# Patient Record
Sex: MALE | Race: WHITE | NOT HISPANIC OR LATINO | Employment: OTHER | URBAN - METROPOLITAN AREA
[De-identification: names, ages, dates, MRNs, and addresses within clinical notes are randomized per-mention and may not be internally consistent; named-entity substitution may affect disease eponyms.]

---

## 2017-01-04 ENCOUNTER — OFFICE VISIT (OUTPATIENT)
Dept: FAMILY MEDICINE CLINIC | Facility: CLINIC | Age: 72
End: 2017-01-04

## 2017-01-04 VITALS
WEIGHT: 215.2 LBS | SYSTOLIC BLOOD PRESSURE: 130 MMHG | HEIGHT: 70 IN | TEMPERATURE: 98 F | HEART RATE: 84 BPM | DIASTOLIC BLOOD PRESSURE: 80 MMHG | BODY MASS INDEX: 30.81 KG/M2 | OXYGEN SATURATION: 96 %

## 2017-01-04 DIAGNOSIS — J01.90 ACUTE SINUSITIS, RECURRENCE NOT SPECIFIED, UNSPECIFIED LOCATION: Primary | ICD-10-CM

## 2017-01-04 DIAGNOSIS — J40 BRONCHITIS: ICD-10-CM

## 2017-01-04 PROCEDURE — 99214 OFFICE O/P EST MOD 30 MIN: CPT | Performed by: FAMILY MEDICINE

## 2017-01-04 RX ORDER — LEVOFLOXACIN 500 MG/1
500 TABLET, FILM COATED ORAL DAILY
Qty: 10 TABLET | Refills: 0 | Status: SHIPPED | OUTPATIENT
Start: 2017-01-04 | End: 2017-01-13 | Stop reason: SDUPTHER

## 2017-01-04 NOTE — MR AVS SNAPSHOT
Kali Crespo   1/4/2017 2:45 PM   Office Visit    Provider:  Haven Soria MD   Department:  [facility]   Dept Phone:  [phone]                Your Full Care Plan              Today's Medication Changes          These changes are accurate as of: 1/4/17  3:52 PM.  If you have any questions, ask your nurse or doctor.               New Medication(s)Ordered:     levoFLOXacin 500 MG tablet   Commonly known as:  LEVAQUIN   Take 1 tablet by mouth Daily for 10 days.   Started by:  Haven Soria MD            Where to Get Your Medications      These medications were sent to [address] - [address] [facility] - [phone]  - [phone] [address] [address]    Hours:  24-hours Phone:  [phone]     levoFLOXacin 500 MG tablet                  Your Updated Medication List          This list is accurate as of: 1/4/17  3:52 PM.  Always use your most recent med list.                CENTRAL-KEVON FOR SENIORS PO       diazePAM 5 MG tablet   Commonly known as:  VALIUM   Take 1 tablet by mouth 2 (Two) Times a Day As Needed for anxiety for up to 30 days.       fish oil 1000 MG capsule capsule       fluticasone 50 MCG/ACT nasal spray   Commonly known as:  FLONASE   1 spray into each nostril 2 (Two) Times a Day.       irbesartan 300 MG tablet   Commonly known as:  AVAPRO   Take 1 tablet by mouth Daily.       levoFLOXacin 500 MG tablet   Commonly known as:  LEVAQUIN   Take 1 tablet by mouth Daily for 10 days.       loratadine 10 MG tablet   Commonly known as:  CLARITIN       omeprazole 20 MG capsule   Commonly known as:  priLOSEC   Take 1 capsule by mouth Daily.       sildenafil 100 MG tablet   Commonly known as:  VIAGRA   Take 1 tablet by mouth Daily As Needed for erectile dysfunction.       simvastatin 40 MG tablet   Commonly known as:  ZOCOR   Take 1 tablet by mouth Every Night.       thiamine 100 MG tablet   Commonly known as:  VITAMIN B-1               You Were Diagnosed With        Codes Comments    Acute sinusitis, recurrence not specified, unspecified location    -  Primary ICD-10-CM: J01.90  ICD-9-CM: 461.9     Bronchitis     ICD-10-CM: J40  ICD-9-CM: 490       Instructions     None    Patient Instructions History      [employer] Signup     [facility] [employer] allows you to send messages to your doctor, view your test results, renew your prescriptions, schedule appointments, and more. To sign up, go to [employer] and click on the Sign Up Now link in the New User? box. Enter your [employer] Activation Code exactly as it appears below along with the last four digits of your Social Security Number and your Date of Birth () to complete the sign-up process. If you do not sign up before the expiration date, you must request a new code.    [employer] Activation Code: [email or web address]  Expires: 2017  3:51 PM    If you have questions, you can email [email or web address] or call [phone] to talk to our [employer] staff. Remember, [employer] is NOT to be used for urgent needs. For medical emergencies, dial 911.               Other Info from Your Visit           Your Appointments     Mar 08, 2017 11:00 AM EST   Follow Up with Maty Horowitz MD   [facility] NEUROLOGY (--)    [address] [address]  [facility] [address] [phone]           Arrive 15 minutes prior to appointment.            2017  1:30 PM EDT   Office Visit with Nathan Arango MD   [facility] FAMILY MEDICINE (--)    [address]  [facility] [address] [phone]           Arrive 15 minutes prior to appointment.              Allergies     Amoxicillin      Codeine Sulfate      Penicillins      Sulfa Antibiotics        Reason for Visit     URI     Cough     Wheezing           Vital Signs     Blood Pressure Pulse Temperature Height  "Weight Oxygen Saturation    130/80 (BP Location: Left arm, Patient Position: Sitting, Cuff Size: Large Adult) 84 98 °F (36.7 °C) (Oral) 70\" (177.8 cm) 215 lb 3.2 oz (97.6 kg) 96%    Body Mass Index Smoking Status                30.88 kg/m2 Former Smoker          Problems and Diagnoses Noted     Acute sinus infection    -  Primary    Bronchitis          "

## 2017-01-04 NOTE — PROGRESS NOTES
Subjective   Kali Crespo is a 71 y.o. male.     History of Present Illness Kali Crespo 71 y.o. male who presents for evaluation of upper respiratory congestion, sinus pressure and congestion. Symptoms include sore throat, nasal blockage, post nasal drip, productive cough and wheezing.  Onset of symptoms was 3 days ago, gradually worsening since that time. Patient denies shortness of breath, fever.   Evaluation to date: none Treatment to date:  none.       The following portions of the patient's history were reviewed and updated as appropriate: allergies, current medications, past family history, past medical history, past social history, past surgical history and problem list.    Review of Systems   Constitutional: Negative for fever.   HENT: Positive for congestion, postnasal drip, sinus pressure and sore throat.    Respiratory: Positive for cough and wheezing. Negative for shortness of breath.    Gastrointestinal: Negative for nausea and vomiting.   Skin: Negative.        Objective   Physical Exam   Constitutional: He appears well-developed and well-nourished.   HENT:   Right Ear: Tympanic membrane normal.   Left Ear: Tympanic membrane normal.   Mouth/Throat: Posterior oropharyngeal erythema present.   Eyes: EOM are normal. Pupils are equal, round, and reactive to light.   Cardiovascular: Normal rate, regular rhythm and normal heart sounds.    Pulmonary/Chest: Effort normal and breath sounds normal.   Lymphadenopathy:     He has no cervical adenopathy.   Skin: Skin is warm and dry.   Psychiatric: He has a normal mood and affect. His behavior is normal. Judgment and thought content normal.   Vitals reviewed.      Assessment/Plan   Kali was seen today for uri, cough and wheezing.    Diagnoses and all orders for this visit:    Acute sinusitis, recurrence not specified, unspecified location  -     levoFLOXacin (LEVAQUIN) 500 MG tablet; Take 1 tablet by mouth Daily for 10 days.    Bronchitis  -      levoFLOXacin (LEVAQUIN) 500 MG tablet; Take 1 tablet by mouth Daily for 10 days.

## 2017-01-13 ENCOUNTER — TELEPHONE (OUTPATIENT)
Dept: FAMILY MEDICINE CLINIC | Facility: CLINIC | Age: 72
End: 2017-01-13

## 2017-01-13 DIAGNOSIS — J01.90 ACUTE SINUSITIS, RECURRENCE NOT SPECIFIED, UNSPECIFIED LOCATION: ICD-10-CM

## 2017-01-13 DIAGNOSIS — J40 BRONCHITIS: ICD-10-CM

## 2017-01-13 RX ORDER — LEVOFLOXACIN 500 MG/1
500 TABLET, FILM COATED ORAL DAILY
Qty: 10 TABLET | Refills: 0 | Status: SHIPPED | OUTPATIENT
Start: 2017-01-13 | End: 2017-01-23

## 2017-03-08 ENCOUNTER — OFFICE VISIT (OUTPATIENT)
Dept: NEUROLOGY | Facility: CLINIC | Age: 72
End: 2017-03-08

## 2017-03-08 VITALS
DIASTOLIC BLOOD PRESSURE: 92 MMHG | SYSTOLIC BLOOD PRESSURE: 144 MMHG | BODY MASS INDEX: 29.84 KG/M2 | HEIGHT: 70 IN | WEIGHT: 208.4 LBS

## 2017-03-08 DIAGNOSIS — R42 VERTIGO: ICD-10-CM

## 2017-03-08 DIAGNOSIS — G43.009 MIGRAINE WITHOUT AURA AND WITHOUT STATUS MIGRAINOSUS, NOT INTRACTABLE: ICD-10-CM

## 2017-03-08 PROCEDURE — 99213 OFFICE O/P EST LOW 20 MIN: CPT | Performed by: PSYCHIATRY & NEUROLOGY

## 2017-03-08 RX ORDER — NORTRIPTYLINE HYDROCHLORIDE 25 MG/1
CAPSULE ORAL
COMMUNITY
Start: 2017-02-27 | End: 2017-06-14 | Stop reason: SDUPTHER

## 2017-03-08 RX ORDER — DIAZEPAM 5 MG/1
TABLET ORAL
Refills: 0 | COMMUNITY
Start: 2017-02-12 | End: 2017-03-08 | Stop reason: SDUPTHER

## 2017-03-08 RX ORDER — DIAZEPAM 5 MG/1
5 TABLET ORAL 2 TIMES DAILY
Qty: 60 TABLET | Refills: 0 | Status: SHIPPED | OUTPATIENT
Start: 2017-03-08 | End: 2017-04-07

## 2017-03-08 NOTE — PROGRESS NOTES
Subjective:   Patient ID: Kali Crespo is a 71 y.o. male with history of migraine headaches without aura and vertigo issues came for follow-up appointment.    History of Present Illness   He was unaccompanied and according to him, he has worsening of the headaches recently due to weather changes and having 1-2 migraines in a month, lasting for few hours, throbbing nature and pain is all over, severity 6/10, associated with nausea, photophobia and phonophobia and complaint with the medication nortriptyline and denies any side effects.  Complaining of dizziness episodes and happening once in one to 2 months and lasting for few minutes associated with nausea but denies any blurred vision, double vision, weakness, tingling numbness, balance problems while walking and complaint with the medication diazepam for more than 30 years and denies any side effects.  Denies any falls, passing out spells or recent hospitalizations since last visit.    The following portions of the patient's history were reviewed and updated as appropriate: allergies, current medications, past family history, past medical history, past social history, past surgical history and problem list.    Review of Systems   Constitutional: Negative for activity change, appetite change and fatigue.   HENT: Negative for facial swelling.    Eyes: Negative for visual disturbance.   Respiratory: Negative for choking, chest tightness and shortness of breath.    Cardiovascular: Negative for chest pain and leg swelling.   Endocrine: Negative for polydipsia and polyphagia.   Genitourinary: Negative for difficulty urinating, frequency and urgency.   Musculoskeletal: Positive for gait problem.   Skin: Negative for color change, rash and wound.   Allergic/Immunologic: Negative for environmental allergies, food allergies and immunocompromised state.   Neurological: Positive for headaches. Negative for tremors, syncope, facial asymmetry, speech difficulty and  light-headedness.   Hematological: Negative for adenopathy. Bruises/bleeds easily.   Psychiatric/Behavioral: Negative for agitation, behavioral problems, confusion, decreased concentration, dysphoric mood, hallucinations, self-injury, sleep disturbance and suicidal ideas. The patient is not nervous/anxious and is not hyperactive.         Objective:    Neurologic Exam  NEUROLOGICAL EXAMINATION  :  Higher integrative functions : No aphasia or dysarthria.  CRANIAL NERVES : Cranial nerve II: Normal visual acuity and visual fields.  On funduscopic exam, discs are flat with sharp margins cranial nerves III, IV, VI: Extraocular movements are full without nystagmus; pupils are equal, round and reactive to light.  Cranial nerve V: Normal facial sensation and strength of muscles of mastication.  Cranial nerve: VII: Facial movements are symmetric.  No weakness.  Cranial nerve VIII: Auditory acuity is normal.  Cranial nerve IX, X: Symmetric palate movement.  Cranial nerve XI sternocleidomastoid and trapezius are normal.  Cranial nerve XII: Tongue in the midline with no atrophy or fasciculations.      MOTOR : Normal muscle strength and tone.  SENSATION : Normal to light touch, pinprick, vibration sensations intact and Romberg is negative.  MUSCLE STRETCH REFLEXES : Normal and symmetric in the upper extremities and lower extremities and the plantar responses are flexor bilaterally. COORDINATION : Finger to nose test showed no dysmetria.  Rapid alternating movements are normal.   GAIT : Walks on heels, toes, except for mild difficulty with tandem walk.    Physical Exam   Vitals reviewed.  GENERAL EXAMINATION : Patient is well-developed, well-nourished, well-groomed, alert and approriate in no apparent distress.  HEENT: Normocephalic, normal funduscopic exam, no visible oral lesions.  NECK : Normal range of motion, no tenderness, no malalignment.  CARDIAC : Regular rate and rhythm, no murmurs.  CHEST : Clear to auscultation,  bilateral.   No wheezing .  ABDOMEN : Soft, non tender and non distended. EXTREMITIES: No edema. SKIN : No rashes or lesions visible. MUSCULOSKELETAL : No muscle weakness or muscle pain. No joint pains. PSYCHIATRIC : Awake, oriented and follwoing verbal commands well.     Assessment/Plan:     Kali was seen today for migraine.    Diagnoses and all orders for this visit:    Vertigo    Migraine without aura and without status migrainosus, not intractable    Other orders  -     diazePAM (VALIUM) 5 MG tablet; Take 1 tablet by mouth 2 (Two) Times a Day for 30 days.      71-year-old right-handed white male with history of migraine headaches without aura and vertigo issues with status post inner ear surgeries in 1970s came for follow-up appointment.  On exam, no new focal deficits were seen except for mild difficulty with tandem walk.  Discussed with the patient regarding his signs and symptoms and told him to change nortriptyline to 25 mg 3 capsules by mouth at bedtime and discussed about side effects.  Discussed about migraine triggers and medication overuse headaches.  Continue diazepam 5 mg by mouth twice a day and will run Sudarshan report and discussed about side effects.  Discussed about fall precautions and told him to continue physical activity and will consider physical therapy in future for worsening of balance issues.  Will follow-up in 3 months or earlier if problems arise.    Thank you for allowing me to participate in the care of the patient.  Please feel free to call for any questions at your convenience.    Yours sincerely,    Maty Horowitz M.D

## 2017-03-20 DIAGNOSIS — I10 BENIGN ESSENTIAL HTN: ICD-10-CM

## 2017-03-20 DIAGNOSIS — C61 PROSTATE CANCER (HCC): ICD-10-CM

## 2017-03-22 LAB
ALBUMIN SERPL-MCNC: 4.4 G/DL (ref 3.5–5.2)
ALBUMIN/GLOB SERPL: 2.2 G/DL
ALP SERPL-CCNC: 103 U/L (ref 39–117)
ALT SERPL-CCNC: 19 U/L (ref 1–41)
AST SERPL-CCNC: 17 U/L (ref 1–40)
BASOPHILS # BLD AUTO: 0.02 10*3/MM3 (ref 0–0.2)
BASOPHILS NFR BLD AUTO: 0.4 % (ref 0–1.5)
BILIRUB SERPL-MCNC: 0.5 MG/DL (ref 0.1–1.2)
BUN SERPL-MCNC: 14 MG/DL (ref 8–23)
BUN/CREAT SERPL: 15.2 (ref 7–25)
CALCIUM SERPL-MCNC: 9.7 MG/DL (ref 8.6–10.5)
CHLORIDE SERPL-SCNC: 102 MMOL/L (ref 98–107)
CHOLEST SERPL-MCNC: 191 MG/DL (ref 0–200)
CO2 SERPL-SCNC: 26.4 MMOL/L (ref 22–29)
CREAT SERPL-MCNC: 0.92 MG/DL (ref 0.76–1.27)
EOSINOPHIL # BLD AUTO: 0.19 10*3/MM3 (ref 0–0.7)
EOSINOPHIL NFR BLD AUTO: 3.6 % (ref 0.3–6.2)
ERYTHROCYTE [DISTWIDTH] IN BLOOD BY AUTOMATED COUNT: 13.7 % (ref 11.5–14.5)
GLOBULIN SER CALC-MCNC: 2 GM/DL
GLUCOSE SERPL-MCNC: 84 MG/DL (ref 65–99)
HCT VFR BLD AUTO: 43.9 % (ref 40.4–52.2)
HDLC SERPL-MCNC: 45 MG/DL (ref 40–60)
HGB BLD-MCNC: 15.2 G/DL (ref 13.7–17.6)
IMM GRANULOCYTES # BLD: 0.02 10*3/MM3 (ref 0–0.03)
IMM GRANULOCYTES NFR BLD: 0.4 % (ref 0–0.5)
LDLC SERPL CALC-MCNC: 132 MG/DL (ref 0–100)
LYMPHOCYTES # BLD AUTO: 1.33 10*3/MM3 (ref 0.9–4.8)
LYMPHOCYTES NFR BLD AUTO: 25.5 % (ref 19.6–45.3)
MCH RBC QN AUTO: 31.7 PG (ref 27–32.7)
MCHC RBC AUTO-ENTMCNC: 34.6 G/DL (ref 32.6–36.4)
MCV RBC AUTO: 91.5 FL (ref 79.8–96.2)
MONOCYTES # BLD AUTO: 0.37 10*3/MM3 (ref 0.2–1.2)
MONOCYTES NFR BLD AUTO: 7.1 % (ref 5–12)
NEUTROPHILS # BLD AUTO: 3.29 10*3/MM3 (ref 1.9–8.1)
NEUTROPHILS NFR BLD AUTO: 63 % (ref 42.7–76)
PLATELET # BLD AUTO: 219 10*3/MM3 (ref 140–500)
POTASSIUM SERPL-SCNC: 4.7 MMOL/L (ref 3.5–5.2)
PROT SERPL-MCNC: 6.4 G/DL (ref 6–8.5)
PSA SERPL-MCNC: <0.014 NG/ML (ref 0–4)
RBC # BLD AUTO: 4.8 10*6/MM3 (ref 4.6–6)
SODIUM SERPL-SCNC: 142 MMOL/L (ref 136–145)
TRIGL SERPL-MCNC: 71 MG/DL (ref 0–150)
TSH SERPL DL<=0.005 MIU/L-ACNC: 4.58 MIU/ML (ref 0.27–4.2)
VLDLC SERPL CALC-MCNC: 14.2 MG/DL (ref 5–40)
WBC # BLD AUTO: 5.22 10*3/MM3 (ref 4.5–10.7)

## 2017-03-29 LAB
T4 FREE SERPL-MCNC: 1.15 NG/DL (ref 0.93–1.7)
WRITTEN AUTHORIZATION: NORMAL

## 2017-04-11 ENCOUNTER — OFFICE VISIT (OUTPATIENT)
Dept: FAMILY MEDICINE CLINIC | Facility: CLINIC | Age: 72
End: 2017-04-11

## 2017-04-11 VITALS
WEIGHT: 209 LBS | BODY MASS INDEX: 29.92 KG/M2 | HEIGHT: 70 IN | TEMPERATURE: 98 F | SYSTOLIC BLOOD PRESSURE: 130 MMHG | HEART RATE: 96 BPM | RESPIRATION RATE: 16 BRPM | DIASTOLIC BLOOD PRESSURE: 79 MMHG

## 2017-04-11 DIAGNOSIS — K21.9 GASTROESOPHAGEAL REFLUX DISEASE WITHOUT ESOPHAGITIS: ICD-10-CM

## 2017-04-11 DIAGNOSIS — Z00.00 ANNUAL PHYSICAL EXAM: Primary | ICD-10-CM

## 2017-04-11 DIAGNOSIS — J30.1 SEASONAL ALLERGIC RHINITIS DUE TO POLLEN: ICD-10-CM

## 2017-04-11 DIAGNOSIS — I10 BENIGN ESSENTIAL HTN: ICD-10-CM

## 2017-04-11 DIAGNOSIS — E78.2 MIXED HYPERLIPIDEMIA: ICD-10-CM

## 2017-04-11 PROCEDURE — G0438 PPPS, INITIAL VISIT: HCPCS | Performed by: FAMILY MEDICINE

## 2017-04-11 PROCEDURE — 99214 OFFICE O/P EST MOD 30 MIN: CPT | Performed by: FAMILY MEDICINE

## 2017-04-11 RX ORDER — SIMVASTATIN 40 MG
40 TABLET ORAL NIGHTLY
Qty: 90 TABLET | Refills: 1 | Status: SHIPPED | OUTPATIENT
Start: 2017-04-11 | End: 2017-10-11 | Stop reason: SDUPTHER

## 2017-04-11 RX ORDER — IRBESARTAN 300 MG/1
300 TABLET ORAL DAILY
Qty: 90 TABLET | Refills: 1 | Status: SHIPPED | OUTPATIENT
Start: 2017-04-11 | End: 2017-10-11 | Stop reason: SDUPTHER

## 2017-04-11 RX ORDER — OMEPRAZOLE 20 MG/1
20 CAPSULE, DELAYED RELEASE ORAL DAILY
Qty: 90 CAPSULE | Refills: 1 | Status: SHIPPED | OUTPATIENT
Start: 2017-04-11 | End: 2017-10-11 | Stop reason: SDUPTHER

## 2017-04-11 RX ORDER — FLUTICASONE PROPIONATE 50 MCG
1 SPRAY, SUSPENSION (ML) NASAL 2 TIMES DAILY
Qty: 3 EACH | Refills: 1 | Status: SHIPPED | OUTPATIENT
Start: 2017-04-11 | End: 2017-10-11 | Stop reason: SDUPTHER

## 2017-04-11 RX ORDER — EPINEPHRINE 0.3 MG/.3ML
0.3 INJECTION SUBCUTANEOUS ONCE
Qty: 1 EACH | Refills: 5 | Status: SHIPPED | OUTPATIENT
Start: 2017-04-11 | End: 2017-04-11

## 2017-04-11 NOTE — PROGRESS NOTES
Subjective   Kali Crespo is a 71 y.o. male.     History of Present Illness     Chief Complaint:   Chief Complaint   Patient presents with   • Hypertension     MED REFILL / MEDICARE WELLNESS EXAM DUE / PHQ2 PHQ9 DONE NO DAILY ASA   • Hyperlipidemia   • Heartburn   • Arthritis   • LAB RESULTS.   • Allergies       Kali Crespo 71 y.o. male who presents today for Medical Management of the below listed issues and medication refills.  he has a history of   Patient Active Problem List   Diagnosis   • Cervical pain   • Migraine without aura and without status migrainosus, not intractable   • Vertigo   • Benign essential HTN   • Acid reflux   • Encounter for pre-operative cardiovascular clearance   • Abnormal EKG   • Hyperlipidemia   • Erectile dysfunction   • Prostate cancer   .  Since the last visit, he has overall felt well.  he has been compliant with   Current Outpatient Prescriptions:   •  fluticasone (FLONASE) 50 MCG/ACT nasal spray, 1 spray into each nostril 2 (Two) Times a Day., Disp: 3 each, Rfl: 1  •  irbesartan (AVAPRO) 300 MG tablet, Take 1 tablet by mouth Daily., Disp: 90 tablet, Rfl: 1  •  omeprazole (priLOSEC) 20 MG capsule, Take 1 capsule by mouth Daily., Disp: 90 capsule, Rfl: 1  •  simvastatin (ZOCOR) 40 MG tablet, Take 1 tablet by mouth Every Night., Disp: 90 tablet, Rfl: 1  •  loratadine (CLARITIN) 10 MG tablet, Take 10 mg by mouth daily., Disp: , Rfl:   •  Multiple Vitamins-Minerals (CENTRAL-KEVON FOR SENIORS PO), Take by mouth., Disp: , Rfl:   •  nortriptyline (PAMELOR) 25 MG capsule, , Disp: , Rfl:   •  Omega-3 Fatty Acids (FISH OIL) 1000 MG capsule capsule, Take 1,000 mg by mouth daily., Disp: , Rfl:   •  sildenafil (VIAGRA) 100 MG tablet, Take 1 tablet by mouth Daily As Needed for erectile dysfunction., Disp: 10 tablet, Rfl: 11  •  Thiamine HCl (VITAMIN B-1) 100 MG tablet, Take 500 mg by mouth daily., Disp: , Rfl: .  he denies medication side effects.    All of the chronic condition(s)  "listed above are stable w/o issues.    /79  Pulse 96  Temp 98 °F (36.7 °C) (Oral)   Resp 16  Ht 70\" (177.8 cm)  Wt 209 lb (94.8 kg)  BMI 29.99 kg/m2    Results for orders placed or performed in visit on 03/20/17   Comprehensive metabolic panel   Result Value Ref Range    Glucose 84 65 - 99 mg/dL    BUN 14 8 - 23 mg/dL    Creatinine 0.92 0.76 - 1.27 mg/dL    eGFR Non African Am 81 >60 mL/min/1.73    eGFR African Am 98 >60 mL/min/1.73    BUN/Creatinine Ratio 15.2 7.0 - 25.0    Sodium 142 136 - 145 mmol/L    Potassium 4.7 3.5 - 5.2 mmol/L    Chloride 102 98 - 107 mmol/L    Total CO2 26.4 22.0 - 29.0 mmol/L    Calcium 9.7 8.6 - 10.5 mg/dL    Total Protein 6.4 6.0 - 8.5 g/dL    Albumin 4.40 3.50 - 5.20 g/dL    Globulin 2.0 gm/dL    A/G Ratio 2.2 g/dL    Total Bilirubin 0.5 0.1 - 1.2 mg/dL    Alkaline Phosphatase 103 39 - 117 U/L    AST (SGOT) 17 1 - 40 U/L    ALT (SGPT) 19 1 - 41 U/L   Lipid panel   Result Value Ref Range    Total Cholesterol 191 0 - 200 mg/dL    Triglycerides 71 0 - 150 mg/dL    HDL Cholesterol 45 40 - 60 mg/dL    VLDL Cholesterol 14.2 5 - 40 mg/dL    LDL Cholesterol  132 (H) 0 - 100 mg/dL   TSH   Result Value Ref Range    TSH 4.580 (H) 0.270 - 4.200 mIU/mL   PSA   Result Value Ref Range    PSA <0.014 0.000 - 4.000 ng/mL   T4, Free   Result Value Ref Range    Free T4 1.15 0.93 - 1.70 ng/dL   Written Authorization   Result Value Ref Range    Written Authorization Comment    CBC and Differential   Result Value Ref Range    WBC 5.22 4.50 - 10.70 10*3/mm3    RBC 4.80 4.60 - 6.00 10*6/mm3    Hemoglobin 15.2 13.7 - 17.6 g/dL    Hematocrit 43.9 40.4 - 52.2 %    MCV 91.5 79.8 - 96.2 fL    MCH 31.7 27.0 - 32.7 pg    MCHC 34.6 32.6 - 36.4 g/dL    RDW 13.7 11.5 - 14.5 %    Platelets 219 140 - 500 10*3/mm3    Neutrophil Rel % 63.0 42.7 - 76.0 %    Lymphocyte Rel % 25.5 19.6 - 45.3 %    Monocyte Rel % 7.1 5.0 - 12.0 %    Eosinophil Rel % 3.6 0.3 - 6.2 %    Basophil Rel % 0.4 0.0 - 1.5 %    Neutrophils " Absolute 3.29 1.90 - 8.10 10*3/mm3    Lymphocytes Absolute 1.33 0.90 - 4.80 10*3/mm3    Monocytes Absolute 0.37 0.20 - 1.20 10*3/mm3    Eosinophils Absolute 0.19 0.00 - 0.70 10*3/mm3    Basophils Absolute 0.02 0.00 - 0.20 10*3/mm3    Immature Granulocyte Rel % 0.4 0.0 - 0.5 %    Immature Grans Absolute 0.02 0.00 - 0.03 10*3/mm3         The following portions of the patient's history were reviewed and updated as appropriate: allergies, current medications, past family history, past medical history, past social history, past surgical history and problem list.    Review of Systems   Constitutional: Negative for activity change, chills, fatigue and fever.   Respiratory: Negative for cough and chest tightness.    Cardiovascular: Negative for chest pain and palpitations.   Gastrointestinal: Negative for abdominal pain and nausea.   Endocrine: Negative for cold intolerance and polydipsia.   Psychiatric/Behavioral: Negative for behavioral problems and dysphoric mood.   All other systems reviewed and are negative.      Objective   Physical Exam   Constitutional: He appears well-developed and well-nourished.   Neck: Neck supple. No thyromegaly present.   Cardiovascular: Normal rate and regular rhythm.    No murmur heard.  Pulmonary/Chest: Effort normal and breath sounds normal.   Abdominal: Bowel sounds are normal.   Psychiatric: He has a normal mood and affect. His behavior is normal.   Nursing note and vitals reviewed.  Labs reviewed with pt today during visit. All questions answered.      Assessment/Plan   Kali was seen today for hypertension, hyperlipidemia, heartburn, arthritis, lab results. and allergies.    Diagnoses and all orders for this visit:    Annual physical exam    Benign essential HTN  -     irbesartan (AVAPRO) 300 MG tablet; Take 1 tablet by mouth Daily.    Seasonal allergic rhinitis due to pollen  -     fluticasone (FLONASE) 50 MCG/ACT nasal spray; 1 spray into each nostril 2 (Two) Times a  Day.    Gastroesophageal reflux disease without esophagitis  -     omeprazole (priLOSEC) 20 MG capsule; Take 1 capsule by mouth Daily.    Mixed hyperlipidemia  -     simvastatin (ZOCOR) 40 MG tablet; Take 1 tablet by mouth Every Night.    Other orders  -     EPINEPHrine (EPIPEN 2-CYN) 0.3 MG/0.3ML solution auto-injector injection; Inject 0.3 mL into the shoulder, thigh, or buttocks 1 (One) Time for 1 dose.

## 2017-04-12 NOTE — PATIENT INSTRUCTIONS
Medicare Wellness  Personal Prevention Plan of Service     Date of Office Visit:  2017  Encounter Provider:  Nathan Arango MD  Place of Service:  [facility]  Patient Name: Kali Crespo  :  1945    As part of the Medicare Wellness portion of your visit today, we are providing you with this personalized preventive plan of services (PPPS). This plan is based upon recommendations of the [place] Preventive Services Task Force (USPSTF) and the Advisory Committee on Immunization Practices (ACIP).    This lists the preventive care services that should be considered, and provides dates of when you are due. Items listed as completed are up-to-date and do not require any further intervention.    Health Maintenance   Topic Date Due   • AAA SCREEN (ONE-TIME)  2016   • PROSTATE CANCER SCREENING  2018   • LIPID PANEL  2018   • MEDICARE ANNUAL WELLNESS  2018   • COLOGUARD  2018   • COLONOSCOPY  2025   • INFLUENZA VACCINE  Completed   • PNEUMOCOCCAL VACCINES (65+ LOW/MEDIUM RISK)  Completed   • ZOSTER VACCINE  Completed   • HEPATITIS C SCREENING  Excluded   • TDAP/TD VACCINES  Excluded       No orders of the defined types were placed in this encounter.      Return in about 6 months (around 10/11/2017) for Recheck.

## 2017-04-12 NOTE — PROGRESS NOTES
QUICK REFERENCE INFORMATION:  The ABCs of the Annual Wellness Visit    Initial Medicare Wellness Visit    HEALTH RISK ASSESSMENT    1945    Recent Hospitalizations:  No recent hospitalization(s)..        Current Medical Providers:  Patient Care Team:  Nathan Arango MD as PCP - General  Nathan Arango MD as PCP - Family Medicine  Maty Horowitz MD as PCP - Claims Attributed - PLEASE DO NOT REMOVE  Nathan Arango MD as MSSP ACO Attributed - PLEASE DO NOT REMOVE        Smoking Status:  History   Smoking Status   • Former Smoker   • Packs/day: 0.25   • Types: Cigarettes   • Start date: 1965   • Quit date: 1969   Smokeless Tobacco   • Never Used     Comment: 40 years ago       Alcohol Consumption:  History   Alcohol Use No     Comment: seldom       Depression Screen:   PHQ-9 Depression Screening 4/11/2017   Little interest or pleasure in doing things 0   Feeling down, depressed, or hopeless 0   Trouble falling or staying asleep, or sleeping too much 0   Feeling tired or having little energy 0   Poor appetite or overeating 0   Feeling bad about yourself - or that you are a failure or have let yourself or your family down 0   Trouble concentrating on things, such as reading the newspaper or watching television 0   Moving or speaking so slowly that other people could have noticed. Or the opposite - being so fidgety or restless that you have been moving around a lot more than usual 0   Thoughts that you would be better off dead, or of hurting yourself in some way 0   PHQ-9 Total Score 0       Health Habits and Functional and Cognitive Screening:  Functional & Cognitive Status 4/11/2017   Do you have difficulty preparing food and eating? No   Do you have difficulty bathing yourself? No   Do you have difficulty getting dressed? No   Do you have difficulty using the toilet? No   Do you have difficulty moving around from place to place? No   In the past year have you fallen or experienced a near fall? No   Do you need  help using the phone?  No   Are you deaf or do you have serious difficulty hearing?  No   Do you need help with transportation? No   Do you need help shopping? No   Do you need help preparing meals?  No   Do you need help with housework?  No   Do you need help with laundry? No   Do you need help taking your medications? No   Do you need help managing money? No   Do you have difficulty concentrating, remembering or making decisions? No       Health Habits  Current Diet: Well Balanced Diet  Dental Exam: Up to date  Eye Exam: Up to date  Exercise (times per week): 7 times per week  Current Exercise Activities Include: Walking          Does the patient have evidence of cognitive impairment? No    Asiprin use counseling: Does not need ASA (and currently is not on it)      Recent Lab Results:    Visual Acuity:  No exam data present    Age-appropriate Screening Schedule:  Refer to the list below for future screening recommendations based on patient's age, sex and/or medical conditions. Orders for these recommended tests are listed in the plan section. The patient has been provided with a written plan.    Health Maintenance   Topic Date Due   • PROSTATE CANCER SCREENING  03/22/2018   • LIPID PANEL  03/22/2018   • COLONOSCOPY  05/28/2025   • INFLUENZA VACCINE  Completed   • PNEUMOCOCCAL VACCINES (65+ LOW/MEDIUM RISK)  Completed   • ZOSTER VACCINE  Completed   • TDAP/TD VACCINES  Excluded        Subjective   History of Present Illness    Kali Crespo is a 71 y.o. male who presents for an Annual Wellness Visit.    The following portions of the patient's history were reviewed and updated as appropriate: allergies, current medications, past family history, past medical history, past social history, past surgical history and problem list.    Outpatient Medications Prior to Visit   Medication Sig Dispense Refill   • fluticasone (FLONASE) 50 MCG/ACT nasal spray 1 spray into each nostril 2 (Two) Times a Day. 3 each 1   •  "irbesartan (AVAPRO) 300 MG tablet Take 1 tablet by mouth Daily. 90 tablet 1   • omeprazole (PriLOSEC) 20 MG capsule Take 1 capsule by mouth Daily. 90 capsule 1   • simvastatin (ZOCOR) 40 MG tablet Take 1 tablet by mouth Every Night. 90 tablet 1   • loratadine (CLARITIN) 10 MG tablet Take 10 mg by mouth daily.     • Multiple Vitamins-Minerals (CENTRAL-KEVON FOR SENIORS PO) Take by mouth.     • nortriptyline (PAMELOR) 25 MG capsule      • Omega-3 Fatty Acids (FISH OIL) 1000 MG capsule capsule Take 1,000 mg by mouth daily.     • sildenafil (VIAGRA) 100 MG tablet Take 1 tablet by mouth Daily As Needed for erectile dysfunction. 10 tablet 11   • Thiamine HCl (VITAMIN B-1) 100 MG tablet Take 500 mg by mouth daily.       No facility-administered medications prior to visit.        Patient Active Problem List   Diagnosis   • Cervical pain   • Migraine without aura and without status migrainosus, not intractable   • Vertigo   • Benign essential HTN   • Acid reflux   • Encounter for pre-operative cardiovascular clearance   • Abnormal EKG   • Hyperlipidemia   • Erectile dysfunction   • Prostate cancer       Advance Care Planning:  has an advance directive - a copy HAS NOT been provided    Identification of Risk Factors:  Risk factors include: weight  and cardiovascular risk.    Review of Systems    Compared to one year ago, the patient feels his physical health is the same.  Compared to one year ago, the patient feels his mental health is the same.    Objective     Physical Exam    Vitals:    04/11/17 1322   BP: 130/79   Pulse: 96   Resp: 16   Temp: 98 °F (36.7 °C)   TempSrc: Oral   Weight: 209 lb (94.8 kg)   Height: 70\" (177.8 cm)   PainSc: 0-No pain       Body mass index is 29.99 kg/(m^2).  Discussed the patient's BMI with him. The BMI is above average; BMI management plan is completed.    Assessment/Plan   Patient Self-Management and Personalized Health Advice  The patient has been provided with information about: diet, " exercise and weight management and preventive services including:   · Exercise counseling provided, Fall Risk assessment done, Nutrition counseling provided.    Visit Diagnoses:    ICD-10-CM ICD-9-CM   1. Annual physical exam Z00.00 V70.0   2. Benign essential HTN I10 401.1   3. Seasonal allergic rhinitis due to pollen J30.1 477.0   4. Gastroesophageal reflux disease without esophagitis K21.9 530.81   5. Mixed hyperlipidemia E78.2 272.2       No orders of the defined types were placed in this encounter.      Outpatient Encounter Prescriptions as of 2017   Medication Sig Dispense Refill   • fluticasone (FLONASE) 50 MCG/ACT nasal spray 1 spray into each nostril 2 (Two) Times a Day. 3 each 1   • irbesartan (AVAPRO) 300 MG tablet Take 1 tablet by mouth Daily. 90 tablet 1   • omeprazole (priLOSEC) 20 MG capsule Take 1 capsule by mouth Daily. 90 capsule 1   • simvastatin (ZOCOR) 40 MG tablet Take 1 tablet by mouth Every Night. 90 tablet 1   • [DISCONTINUED] fluticasone (FLONASE) 50 MCG/ACT nasal spray 1 spray into each nostril 2 (Two) Times a Day. 3 each 1   • [DISCONTINUED] irbesartan (AVAPRO) 300 MG tablet Take 1 tablet by mouth Daily. 90 tablet 1   • [DISCONTINUED] omeprazole (PriLOSEC) 20 MG capsule Take 1 capsule by mouth Daily. 90 capsule 1   • [DISCONTINUED] simvastatin (ZOCOR) 40 MG tablet Take 1 tablet by mouth Every Night. 90 tablet 1   • [] EPINEPHrine (EPIPEN 2-CYN) 0.3 MG/0.3ML solution auto-injector injection Inject 0.3 mL into the shoulder, thigh, or buttocks 1 (One) Time for 1 dose. 1 each 5   • loratadine (CLARITIN) 10 MG tablet Take 10 mg by mouth daily.     • Multiple Vitamins-Minerals (CENTRAL-KEVON FOR SENIORS PO) Take by mouth.     • nortriptyline (PAMELOR) 25 MG capsule      • Omega-3 Fatty Acids (FISH OIL) 1000 MG capsule capsule Take 1,000 mg by mouth daily.     • sildenafil (VIAGRA) 100 MG tablet Take 1 tablet by mouth Daily As Needed for erectile dysfunction. 10 tablet 11   • Thiamine  HCl (VITAMIN B-1) 100 MG tablet Take 500 mg by mouth daily.       No facility-administered encounter medications on file as of 4/11/2017.        Reviewed use of high risk medication in the elderly: not applicable  Reviewed for potential of harmful drug interactions in the elderly: not applicable    Follow Up:  No Follow-up on file.     An After Visit Summary and PPPS with all of these plans were given to the patient.

## 2017-04-17 RX ORDER — DIAZEPAM 5 MG/1
TABLET ORAL
Qty: 60 TABLET | Refills: 0 | Status: SHIPPED | OUTPATIENT
Start: 2017-04-17 | End: 2017-06-18 | Stop reason: SDUPTHER

## 2017-06-14 ENCOUNTER — OFFICE VISIT (OUTPATIENT)
Dept: NEUROLOGY | Facility: CLINIC | Age: 72
End: 2017-06-14

## 2017-06-14 VITALS
DIASTOLIC BLOOD PRESSURE: 78 MMHG | OXYGEN SATURATION: 98 % | WEIGHT: 206 LBS | HEIGHT: 70 IN | HEART RATE: 85 BPM | BODY MASS INDEX: 29.49 KG/M2 | SYSTOLIC BLOOD PRESSURE: 122 MMHG

## 2017-06-14 DIAGNOSIS — G43.009 MIGRAINE WITHOUT AURA AND WITHOUT STATUS MIGRAINOSUS, NOT INTRACTABLE: ICD-10-CM

## 2017-06-14 DIAGNOSIS — R42 VERTIGO: ICD-10-CM

## 2017-06-14 PROCEDURE — 99213 OFFICE O/P EST LOW 20 MIN: CPT | Performed by: PSYCHIATRY & NEUROLOGY

## 2017-06-14 RX ORDER — NORTRIPTYLINE HYDROCHLORIDE 75 MG/1
75 CAPSULE ORAL NIGHTLY
Qty: 90 CAPSULE | Refills: 3 | Status: SHIPPED | OUTPATIENT
Start: 2017-06-14 | End: 2017-06-19 | Stop reason: SDUPTHER

## 2017-06-14 NOTE — PROGRESS NOTES
Subjective   Kali Crespo is a 71 y.o. male with history of chronic migraines, intractable and vertigo issues came for follow-up appointment.    History of Present Illness   He was unaccompanied and according to them, he denies any migraine headaches since the last visit except for occasional sinus headaches and complaint with the medication nortriptyline and denies any side effects.  Denies any vertigo issues since last visit and complaint with the medication diazepam and denies any side effects.  Denies any blurred vision, double vision, weakness, tingling numbness or balance problems when walking.  Denies any falls, passing out spells or recent hospitalizations since last visit.  Denies any major depression or anxiety issues.    The following portions of the patient's history were reviewed and updated as appropriate: allergies, current medications, past family history, past medical history, past social history, past surgical history and problem list.    Review of Systems   Constitutional: Negative for chills, fatigue and fever.   HENT: Positive for sinus pressure. Negative for hearing loss, tinnitus and trouble swallowing.    Eyes: Negative for pain, redness and itching.   Respiratory: Negative for cough, shortness of breath and wheezing.    Cardiovascular: Negative for chest pain, palpitations and leg swelling.   Gastrointestinal: Negative for constipation, diarrhea, nausea and vomiting.   Endocrine: Negative for cold intolerance and heat intolerance.   Genitourinary: Negative for decreased urine volume, difficulty urinating, frequency and urgency.   Musculoskeletal: Positive for back pain. Negative for neck pain and neck stiffness.   Skin: Negative for pallor, rash and wound.   Allergic/Immunologic: Negative for environmental allergies and food allergies.   Neurological: Negative for dizziness, tremors, weakness, light-headedness, numbness and headaches.   Hematological: Negative for adenopathy. Does not  bruise/bleed easily.   Psychiatric/Behavioral: Negative for confusion, hallucinations and self-injury. The patient is not nervous/anxious.        Objective   Physical Exam   Vitals reviewed.    GENERAL EXAMINATION : Patient is well-developed, well-nourished, well-groomed, alert and approriate in no apparent distress.  HEENT: Normocephalic, normal funduscopic exam, no visible oral lesions.  NECK : Normal range of motion, no tenderness, no malalignment.  CARDIAC : Regular rate and rhythm, no murmurs.  CHEST : Clear to auscultation, bilateral.   No wheezing .  ABDOMEN : Soft, non tender and non distended. EXTREMITIES: No edema. SKIN : No rashes or lesions visible. MUSCULOSKELETAL : No muscle weakness or muscle pain. No joint pains. PSYCHIATRIC : Awake and follwoing verbal commands well.     NEUROLOGICAL EXAMINATION  :  Higher integrative functions : No aphasia or dysarthria.  CRANIAL NERVES : Cranial nerve II: Normal visual acuity and visual fields.  On funduscopic exam, discs are flat with sharp margins cranial nerves III, IV, VI: Extraocular movements are full without nystagmus; pupils are equal, round and reactive to light.  Cranial nerve V: Normal facial sensation and strength of muscles of mastication.  Cranial nerve: VII: Facial movements are symmetric.  No weakness.  Cranial nerve VIII: Auditory acuity is normal.  Cranial nerve IX, X: Symmetric palate movement.  Cranial nerve XI sternocleidomastoid and trapezius are normal.  Cranial nerve XII: Tongue in the midline with no atrophy or fasciculations.      MOTOR : Normal muscle strength and tone.  SENSATION : Normal to light touch, pinprick, vibration sensations intact and Romberg is negative.  MUSCLE STRETCH REFLEXES : Normal and symmetric in the upper extremities and lower extremities and the plantar responses are flexor bilaterally. COORDINATION : Finger to nose test showed no dysmetria.  Rapid alternating movements are normal.   GAIT : Walks on heels, toes,  except for mild difficulty with tandem walk.    Assessment/Plan   Kali was seen today for migraine and dizziness.    Diagnoses and all orders for this visit:    Migraine without aura and without status migrainosus, not intractable    Vertigo    Other orders  -     nortriptyline (PAMELOR) 75 MG capsule; Take 1 capsule by mouth Every Night for 90 days.    71-year-old right-handed white male with history of chronic migraines, intractable and vertigo issues came for follow-up appointment.  On exam, no new focal deficits were seen except for mild difficulty with tandem walk.  Discussed with the patient regarding his signs and symptoms and told him to continue nortriptyline 75 mg 1 capsule by mouth at bedtime and discussed about side effects.  Discussed about migraine triggers and medication or use headaches.  Told him to continue diazepam 5 mg by mouth twice a day for vertigo issues and patient is on as about for the past 30 years and discussed about side effects.  Will run Sudarshan report during his next visit and discussed about fall precautions and told him to continue physical activity and will consider physical therapy in future for worsening of balance issues.  Will follow-up in 3 months or earlier if problems arise.    Thank you for allowing me to participate in the care of the patient.  Please feel free to call for any questions at your convenience.    Yours sincerely,    Maty Horowitz M.D

## 2017-06-19 ENCOUNTER — TELEPHONE (OUTPATIENT)
Dept: NEUROLOGY | Facility: CLINIC | Age: 72
End: 2017-06-19

## 2017-06-19 RX ORDER — NORTRIPTYLINE HYDROCHLORIDE 75 MG/1
75 CAPSULE ORAL NIGHTLY
Qty: 90 CAPSULE | Refills: 3 | Status: SHIPPED | OUTPATIENT
Start: 2017-06-19 | End: 2017-09-17

## 2017-06-19 RX ORDER — DIAZEPAM 5 MG/1
TABLET ORAL
Qty: 60 TABLET | Refills: 1 | Status: SHIPPED | OUTPATIENT
Start: 2017-06-19 | End: 2017-07-18 | Stop reason: SDUPTHER

## 2017-07-18 RX ORDER — DIAZEPAM 5 MG/1
5 TABLET ORAL EVERY 12 HOURS PRN
Qty: 60 TABLET | Refills: 1 | Status: SHIPPED | OUTPATIENT
Start: 2017-07-18 | End: 2017-11-27 | Stop reason: SDUPTHER

## 2017-08-25 DIAGNOSIS — G43.719 INTRACTABLE CHRONIC MIGRAINE WITHOUT AURA AND WITHOUT STATUS MIGRAINOSUS: Primary | ICD-10-CM

## 2017-08-25 DIAGNOSIS — R42 VERTIGO: ICD-10-CM

## 2017-09-06 ENCOUNTER — TELEPHONE (OUTPATIENT)
Dept: FAMILY MEDICINE CLINIC | Facility: CLINIC | Age: 72
End: 2017-09-06

## 2017-09-06 DIAGNOSIS — E78.2 MIXED HYPERLIPIDEMIA: ICD-10-CM

## 2017-09-06 DIAGNOSIS — I10 BENIGN ESSENTIAL HTN: Primary | ICD-10-CM

## 2017-09-11 LAB
ALBUMIN SERPL-MCNC: 4.2 G/DL (ref 3.5–5.2)
ALBUMIN/GLOB SERPL: 2 G/DL
ALP SERPL-CCNC: 96 U/L (ref 39–117)
ALT SERPL-CCNC: 21 U/L (ref 1–41)
AST SERPL-CCNC: 16 U/L (ref 1–40)
BASOPHILS # BLD AUTO: 0.03 10*3/MM3 (ref 0–0.2)
BASOPHILS NFR BLD AUTO: 0.5 % (ref 0–1.5)
BILIRUB SERPL-MCNC: 0.7 MG/DL (ref 0.1–1.2)
BUN SERPL-MCNC: 13 MG/DL (ref 8–23)
BUN/CREAT SERPL: 12.5 (ref 7–25)
CALCIUM SERPL-MCNC: 9.1 MG/DL (ref 8.6–10.5)
CHLORIDE SERPL-SCNC: 102 MMOL/L (ref 98–107)
CHOLEST SERPL-MCNC: 179 MG/DL (ref 0–200)
CO2 SERPL-SCNC: 29.3 MMOL/L (ref 22–29)
CREAT SERPL-MCNC: 1.04 MG/DL (ref 0.76–1.27)
EOSINOPHIL # BLD AUTO: 0.25 10*3/MM3 (ref 0–0.7)
EOSINOPHIL NFR BLD AUTO: 4.4 % (ref 0.3–6.2)
ERYTHROCYTE [DISTWIDTH] IN BLOOD BY AUTOMATED COUNT: 13.5 % (ref 11.5–14.5)
GLOBULIN SER CALC-MCNC: 2.1 GM/DL
GLUCOSE SERPL-MCNC: 96 MG/DL (ref 65–99)
HCT VFR BLD AUTO: 43.3 % (ref 40.4–52.2)
HDLC SERPL-MCNC: 45 MG/DL (ref 40–60)
HGB BLD-MCNC: 14.6 G/DL (ref 13.7–17.6)
IMM GRANULOCYTES # BLD: 0.03 10*3/MM3 (ref 0–0.03)
IMM GRANULOCYTES NFR BLD: 0.5 % (ref 0–0.5)
LDLC SERPL CALC-MCNC: 112 MG/DL (ref 0–100)
LDLC/HDLC SERPL: 2.48 {RATIO}
LYMPHOCYTES # BLD AUTO: 1.45 10*3/MM3 (ref 0.9–4.8)
LYMPHOCYTES NFR BLD AUTO: 25.3 % (ref 19.6–45.3)
MCH RBC QN AUTO: 31.7 PG (ref 27–32.7)
MCHC RBC AUTO-ENTMCNC: 33.7 G/DL (ref 32.6–36.4)
MCV RBC AUTO: 93.9 FL (ref 79.8–96.2)
MONOCYTES # BLD AUTO: 0.33 10*3/MM3 (ref 0.2–1.2)
MONOCYTES NFR BLD AUTO: 5.8 % (ref 5–12)
NEUTROPHILS # BLD AUTO: 3.64 10*3/MM3 (ref 1.9–8.1)
NEUTROPHILS NFR BLD AUTO: 63.5 % (ref 42.7–76)
PLATELET # BLD AUTO: 235 10*3/MM3 (ref 140–500)
POTASSIUM SERPL-SCNC: 4.3 MMOL/L (ref 3.5–5.2)
PROT SERPL-MCNC: 6.3 G/DL (ref 6–8.5)
RBC # BLD AUTO: 4.61 10*6/MM3 (ref 4.6–6)
SODIUM SERPL-SCNC: 141 MMOL/L (ref 136–145)
TRIGL SERPL-MCNC: 112 MG/DL (ref 0–150)
TSH SERPL DL<=0.005 MIU/L-ACNC: 4.12 MIU/ML (ref 0.27–4.2)
VLDLC SERPL CALC-MCNC: 22.4 MG/DL (ref 5–40)
WBC # BLD AUTO: 5.73 10*3/MM3 (ref 4.5–10.7)

## 2017-10-11 ENCOUNTER — OFFICE VISIT (OUTPATIENT)
Dept: FAMILY MEDICINE CLINIC | Facility: CLINIC | Age: 72
End: 2017-10-11

## 2017-10-11 VITALS
RESPIRATION RATE: 18 BRPM | SYSTOLIC BLOOD PRESSURE: 138 MMHG | HEIGHT: 70 IN | HEART RATE: 96 BPM | TEMPERATURE: 98.2 F | WEIGHT: 210 LBS | DIASTOLIC BLOOD PRESSURE: 83 MMHG | BODY MASS INDEX: 30.06 KG/M2

## 2017-10-11 DIAGNOSIS — K21.9 GASTROESOPHAGEAL REFLUX DISEASE WITHOUT ESOPHAGITIS: ICD-10-CM

## 2017-10-11 DIAGNOSIS — N52.9 ERECTILE DYSFUNCTION, UNSPECIFIED ERECTILE DYSFUNCTION TYPE: ICD-10-CM

## 2017-10-11 DIAGNOSIS — J30.1 CHRONIC SEASONAL ALLERGIC RHINITIS DUE TO POLLEN: ICD-10-CM

## 2017-10-11 DIAGNOSIS — Z23 IMMUNIZATION DUE: Primary | ICD-10-CM

## 2017-10-11 DIAGNOSIS — I10 BENIGN ESSENTIAL HTN: ICD-10-CM

## 2017-10-11 DIAGNOSIS — E78.2 MIXED HYPERLIPIDEMIA: ICD-10-CM

## 2017-10-11 PROCEDURE — 90686 IIV4 VACC NO PRSV 0.5 ML IM: CPT | Performed by: FAMILY MEDICINE

## 2017-10-11 PROCEDURE — G0008 ADMIN INFLUENZA VIRUS VAC: HCPCS | Performed by: FAMILY MEDICINE

## 2017-10-11 PROCEDURE — 99214 OFFICE O/P EST MOD 30 MIN: CPT | Performed by: FAMILY MEDICINE

## 2017-10-11 RX ORDER — FLUTICASONE PROPIONATE 50 MCG
1 SPRAY, SUSPENSION (ML) NASAL 2 TIMES DAILY
Qty: 3 BOTTLE | Refills: 3 | Status: SHIPPED | OUTPATIENT
Start: 2017-10-11 | End: 2018-04-11 | Stop reason: SDUPTHER

## 2017-10-11 RX ORDER — SILDENAFIL CITRATE 20 MG/1
TABLET ORAL
Qty: 60 TABLET | Refills: 11 | Status: SHIPPED | OUTPATIENT
Start: 2017-10-11 | End: 2019-04-11 | Stop reason: SDUPTHER

## 2017-10-11 RX ORDER — SIMVASTATIN 40 MG
40 TABLET ORAL NIGHTLY
Qty: 90 TABLET | Refills: 1 | Status: SHIPPED | OUTPATIENT
Start: 2017-10-11 | End: 2018-04-11 | Stop reason: SDUPTHER

## 2017-10-11 RX ORDER — SILDENAFIL 100 MG/1
100 TABLET, FILM COATED ORAL DAILY PRN
Qty: 10 TABLET | Refills: 11 | Status: SHIPPED | OUTPATIENT
Start: 2017-10-11 | End: 2017-10-11

## 2017-10-11 RX ORDER — OMEPRAZOLE 20 MG/1
20 CAPSULE, DELAYED RELEASE ORAL DAILY
Qty: 90 CAPSULE | Refills: 1 | Status: SHIPPED | OUTPATIENT
Start: 2017-10-11 | End: 2018-04-11 | Stop reason: SDUPTHER

## 2017-10-11 RX ORDER — IRBESARTAN 300 MG/1
300 TABLET ORAL DAILY
Qty: 90 TABLET | Refills: 1 | Status: SHIPPED | OUTPATIENT
Start: 2017-10-11 | End: 2018-04-11 | Stop reason: SDUPTHER

## 2017-10-11 NOTE — PROGRESS NOTES
Subjective   Kali Crespo is a 72 y.o. male.     History of Present Illness     Chief Complaint:   Chief Complaint   Patient presents with   • Hypertension     MED REFILL WANTS WRITTEN PRESCRIPTIONS FOR TODAY PLEASE   • Hyperlipidemia   • Heartburn   • IMMUNIZATION KDUE     FLU SHOT GIVEN LT DELTOID JMS       Kali Crespo 72 y.o. male who presents today for Medical Management of the below listed issues and medication refills.  he has a problem list of   Patient Active Problem List   Diagnosis   • Cervical pain   • Migraine without aura and without status migrainosus, not intractable   • Vertigo   • Benign essential HTN   • Acid reflux   • Encounter for pre-operative cardiovascular clearance   • Abnormal EKG   • Hyperlipidemia   • Erectile dysfunction   • Prostate cancer   .  Since the last visit, he has overall felt well.  he has been compliant with   Current Outpatient Prescriptions:   •  fluticasone (FLONASE) 50 MCG/ACT nasal spray, 1 spray into each nostril 2 (Two) Times a Day., Disp: 3 bottle, Rfl: 3  •  irbesartan (AVAPRO) 300 MG tablet, Take 1 tablet by mouth Daily., Disp: 90 tablet, Rfl: 1  •  omeprazole (priLOSEC) 20 MG capsule, Take 1 capsule by mouth Daily., Disp: 90 capsule, Rfl: 1  •  simvastatin (ZOCOR) 40 MG tablet, Take 1 tablet by mouth Every Night., Disp: 90 tablet, Rfl: 1  •  diazePAM (VALIUM) 5 MG tablet, Take 1 tablet by mouth Every 12 (Twelve) Hours As Needed for Anxiety., Disp: 60 tablet, Rfl: 1  •  doxycycline (MONODOX) 100 MG capsule, Take 1 capsule by mouth 2 (Two) Times a Day., Disp: 14 capsule, Rfl: 0  •  loratadine (CLARITIN) 10 MG tablet, Take 10 mg by mouth daily., Disp: , Rfl:   •  Multiple Vitamins-Minerals (CENTRAL-KEVON FOR SENIORS PO), Take by mouth., Disp: , Rfl:   •  Omega-3 Fatty Acids (FISH OIL) 1000 MG capsule capsule, Take 1,000 mg by mouth daily., Disp: , Rfl:   •  sildenafil (REVATIO) 20 MG tablet, Take 2-3 tabs QD prn, Disp: 60 tablet, Rfl: 11  •  Thiamine HCl  "(VITAMIN B-1) 100 MG tablet, Take 500 mg by mouth daily., Disp: , Rfl: .  he denies medication side effects.    All of the chronic condition(s) listed above are stable w/o issues.    /83  Pulse 96  Temp 98.2 °F (36.8 °C) (Oral)   Resp 18  Ht 70\" (177.8 cm)  Wt 210 lb (95.3 kg)  BMI 30.13 kg/m2    Results for orders placed or performed in visit on 09/06/17   Comprehensive metabolic panel   Result Value Ref Range    Glucose 96 65 - 99 mg/dL    BUN 13 8 - 23 mg/dL    Creatinine 1.04 0.76 - 1.27 mg/dL    eGFR Non African Am 70 >60 mL/min/1.73    eGFR African Am 85 >60 mL/min/1.73    BUN/Creatinine Ratio 12.5 7.0 - 25.0    Sodium 141 136 - 145 mmol/L    Potassium 4.3 3.5 - 5.2 mmol/L    Chloride 102 98 - 107 mmol/L    Total CO2 29.3 (H) 22.0 - 29.0 mmol/L    Calcium 9.1 8.6 - 10.5 mg/dL    Total Protein 6.3 6.0 - 8.5 g/dL    Albumin 4.20 3.50 - 5.20 g/dL    Globulin 2.1 gm/dL    A/G Ratio 2.0 g/dL    Total Bilirubin 0.7 0.1 - 1.2 mg/dL    Alkaline Phosphatase 96 39 - 117 U/L    AST (SGOT) 16 1 - 40 U/L    ALT (SGPT) 21 1 - 41 U/L   Lipid Panel With LDL/HDL Ratio   Result Value Ref Range    Total Cholesterol 179 0 - 200 mg/dL    Triglycerides 112 0 - 150 mg/dL    HDL Cholesterol 45 40 - 60 mg/dL    VLDL Cholesterol 22.4 5 - 40 mg/dL    LDL Cholesterol  112 (H) 0 - 100 mg/dL    LDL/HDL Ratio 2.48    TSH   Result Value Ref Range    TSH 4.120 0.270 - 4.200 mIU/mL   CBC and Differential   Result Value Ref Range    WBC 5.73 4.50 - 10.70 10*3/mm3    RBC 4.61 4.60 - 6.00 10*6/mm3    Hemoglobin 14.6 13.7 - 17.6 g/dL    Hematocrit 43.3 40.4 - 52.2 %    MCV 93.9 79.8 - 96.2 fL    MCH 31.7 27.0 - 32.7 pg    MCHC 33.7 32.6 - 36.4 g/dL    RDW 13.5 11.5 - 14.5 %    Platelets 235 140 - 500 10*3/mm3    Neutrophil Rel % 63.5 42.7 - 76.0 %    Lymphocyte Rel % 25.3 19.6 - 45.3 %    Monocyte Rel % 5.8 5.0 - 12.0 %    Eosinophil Rel % 4.4 0.3 - 6.2 %    Basophil Rel % 0.5 0.0 - 1.5 %    Neutrophils Absolute 3.64 1.90 - 8.10 " 10*3/mm3    Lymphocytes Absolute 1.45 0.90 - 4.80 10*3/mm3    Monocytes Absolute 0.33 0.20 - 1.20 10*3/mm3    Eosinophils Absolute 0.25 0.00 - 0.70 10*3/mm3    Basophils Absolute 0.03 0.00 - 0.20 10*3/mm3    Immature Granulocyte Rel % 0.5 0.0 - 0.5 %    Immature Grans Absolute 0.03 0.00 - 0.03 10*3/mm3         The following portions of the patient's history were reviewed and updated as appropriate: allergies, current medications, past family history, past medical history, past social history, past surgical history and problem list.    Review of Systems   Constitutional: Negative for activity change, chills, fatigue and fever.   Respiratory: Negative for cough and shortness of breath.    Cardiovascular: Negative for chest pain and palpitations.   Gastrointestinal: Negative for abdominal pain.   Endocrine: Negative for cold intolerance.   Psychiatric/Behavioral: Negative for behavioral problems and dysphoric mood. The patient is not nervous/anxious.        Objective   Physical Exam   Constitutional: He appears well-developed and well-nourished.   Neck: Neck supple. No thyromegaly present.   Cardiovascular: Normal rate and regular rhythm.    No murmur heard.  Pulmonary/Chest: Effort normal and breath sounds normal.   Abdominal: Bowel sounds are normal.   Psychiatric: He has a normal mood and affect. His behavior is normal.   Nursing note and vitals reviewed.  Labs reviewed with pt today during visit. All questions answered.      Assessment/Plan   Kali was seen today for hypertension, hyperlipidemia, heartburn and immunization kdue.    Diagnoses and all orders for this visit:    Immunization due  -     Flu Vaccine Quad PF 3YR+    Benign essential HTN  -     irbesartan (AVAPRO) 300 MG tablet; Take 1 tablet by mouth Daily.    Erectile dysfunction, unspecified erectile dysfunction type  -     Discontinue: sildenafil (VIAGRA) 100 MG tablet; Take 1 tablet by mouth Daily As Needed for erectile dysfunction.  -      sildenafil (REVATIO) 20 MG tablet; Take 2-3 tabs QD prn    Mixed hyperlipidemia  -     simvastatin (ZOCOR) 40 MG tablet; Take 1 tablet by mouth Every Night.    Gastroesophageal reflux disease without esophagitis  -     omeprazole (priLOSEC) 20 MG capsule; Take 1 capsule by mouth Daily.    Chronic seasonal allergic rhinitis due to pollen  -     fluticasone (FLONASE) 50 MCG/ACT nasal spray; 1 spray into each nostril 2 (Two) Times a Day.

## 2017-11-27 ENCOUNTER — OFFICE VISIT (OUTPATIENT)
Dept: FAMILY MEDICINE CLINIC | Facility: CLINIC | Age: 72
End: 2017-11-27

## 2017-11-27 VITALS
RESPIRATION RATE: 18 BRPM | SYSTOLIC BLOOD PRESSURE: 158 MMHG | DIASTOLIC BLOOD PRESSURE: 92 MMHG | HEIGHT: 70 IN | HEART RATE: 96 BPM | WEIGHT: 220 LBS | BODY MASS INDEX: 31.5 KG/M2 | TEMPERATURE: 98.1 F

## 2017-11-27 DIAGNOSIS — H81.03 MENIERE DISEASE, BILATERAL: Primary | ICD-10-CM

## 2017-11-27 PROCEDURE — 99213 OFFICE O/P EST LOW 20 MIN: CPT | Performed by: FAMILY MEDICINE

## 2017-11-27 RX ORDER — DIAZEPAM 5 MG/1
5 TABLET ORAL EVERY 12 HOURS PRN
Qty: 60 TABLET | Refills: 2 | Status: SHIPPED | OUTPATIENT
Start: 2017-11-27 | End: 2018-02-27 | Stop reason: SDUPTHER

## 2017-11-27 NOTE — PROGRESS NOTES
Subjective   Kali Crespo is a 72 y.o. male.     CC: Management of Chronic Vertigo/Anxiety    History of Present Illness     Pt returns today to discuss the above. Was seen in the past by neurology for the migraines and vertigo and their last A/P was as follows:    71-year-old right-handed white male with history of chronic migraines, intractable and vertigo issues came for follow-up appointment.  On exam, no new focal deficits were seen except for mild difficulty with tandem walk.  Discussed with the patient regarding his signs and symptoms and told him to continue nortriptyline 75 mg 1 capsule by mouth at bedtime and discussed about side effects.  Discussed about migraine triggers and medication or use headaches.  Told him to continue diazepam 5 mg by mouth twice a day for vertigo issues and patient is on as about for the past 30 years and discussed about side effects.  Will run Sudarshan report during his next visit and discussed about fall precautions and told him to continue physical activity and will consider physical therapy in future for worsening of balance issues.  Will follow-up in 3 months or earlier if problems arise.    Pt has a h/o Meniere's (seen numerous ENTs and Neurology) and has been on the Valium for 30 years for his Meniere's and does well with it. Has even had 2 left ear surgeries for it, too.    The following portions of the patient's history were reviewed and updated as appropriate: allergies, current medications, past family history, past medical history, past social history, past surgical history and problem list.    Review of Systems   Constitutional: Negative for activity change, chills, fatigue and fever.   Respiratory: Negative for cough and shortness of breath.    Cardiovascular: Negative for chest pain and palpitations.   Gastrointestinal: Negative for abdominal pain.   Endocrine: Negative for cold intolerance.   Neurological: Positive for dizziness.   Psychiatric/Behavioral:  "Negative for behavioral problems and dysphoric mood. The patient is nervous/anxious.      /92  Pulse 96  Temp 98.1 °F (36.7 °C) (Oral)   Resp 18  Ht 70\" (177.8 cm)  Wt 220 lb (99.8 kg)  BMI 31.57 kg/m2    Objective   Physical Exam   Constitutional: He appears well-developed and well-nourished.   Neck: Neck supple. No thyromegaly present.   Cardiovascular: Normal rate and regular rhythm.    No murmur heard.  Pulmonary/Chest: Effort normal and breath sounds normal.   Abdominal: Bowel sounds are normal.   Psychiatric: He has a normal mood and affect. His behavior is normal.   Nursing note and vitals reviewed.    The patient has read and signed the [facility] Controlled Substance Contract.  I will continue to see patient for regular follow up appointments.  They are well controlled on their medication.  KERVIN has been reviewed by me and is updated every 3 months. The patient is aware of the potential for addiction and dependence.    Assessment/Plan   Kali was seen today for dizziness and anxiety.    Diagnoses and all orders for this visit:    Meniere disease, bilateral  Comments:  new to me  Orders:  -     diazePAM (VALIUM) 5 MG tablet; Take 1 tablet by mouth Every 12 (Twelve) Hours As Needed (vertigo).               "

## 2018-01-04 ENCOUNTER — OFFICE VISIT (OUTPATIENT)
Dept: FAMILY MEDICINE CLINIC | Facility: CLINIC | Age: 73
End: 2018-01-04

## 2018-01-04 VITALS
WEIGHT: 217 LBS | SYSTOLIC BLOOD PRESSURE: 165 MMHG | RESPIRATION RATE: 18 BRPM | OXYGEN SATURATION: 96 % | TEMPERATURE: 98.1 F | BODY MASS INDEX: 31.07 KG/M2 | DIASTOLIC BLOOD PRESSURE: 94 MMHG | HEART RATE: 92 BPM | HEIGHT: 70 IN

## 2018-01-04 DIAGNOSIS — J01.01 ACUTE RECURRENT MAXILLARY SINUSITIS: Primary | ICD-10-CM

## 2018-01-04 PROCEDURE — 99213 OFFICE O/P EST LOW 20 MIN: CPT | Performed by: NURSE PRACTITIONER

## 2018-01-04 RX ORDER — CEFUROXIME AXETIL 500 MG/1
500 TABLET ORAL 2 TIMES DAILY
Qty: 20 TABLET | Refills: 0 | Status: SHIPPED | OUTPATIENT
Start: 2018-01-04 | End: 2018-02-27 | Stop reason: SDUPTHER

## 2018-01-04 NOTE — PROGRESS NOTES
Subjective   Kali Crespo is a 72 y.o. male.     History of Present Illness   Kali Crespo 72 y.o. male who presents for evaluation of sinus pressure and congestion. Symptoms include congestion, dry cough, headache and sinus pain.  Onset of symptoms was 10 days ago, gradually worsening since that time. Patient denies shortness of breath, fever.   Evaluation to date: none Treatment to date:  OTC oral decongestants, OTC cough suppressant and Tylenol.     The following portions of the patient's history were reviewed and updated as appropriate: allergies, current medications, past family history, past medical history, past social history, past surgical history and problem list.    Review of Systems   Constitutional: Negative for chills and fever.   HENT: Positive for congestion, postnasal drip, rhinorrhea and sinus pressure.    Respiratory: Positive for cough and wheezing. Negative for chest tightness.        Objective   Physical Exam   Constitutional: He is oriented to person, place, and time. He appears well-developed and well-nourished.   HENT:   Right Ear: Tympanic membrane, external ear and ear canal normal.   Left Ear: Tympanic membrane, external ear and ear canal normal.   Nose: Right sinus exhibits maxillary sinus tenderness. Right sinus exhibits no frontal sinus tenderness. Left sinus exhibits maxillary sinus tenderness. Left sinus exhibits no frontal sinus tenderness.   Mouth/Throat: Uvula is midline and oropharynx is clear and moist.   Cardiovascular: Normal rate and regular rhythm.    Pulmonary/Chest: Effort normal and breath sounds normal.   Neurological: He is oriented to person, place, and time.   Skin: Skin is warm and dry.   Psychiatric: He has a normal mood and affect. His behavior is normal. Judgment and thought content normal.   Nursing note and vitals reviewed.      Assessment/Plan   Kali was seen today for uri.    Diagnoses and all orders for this visit:    Acute recurrent maxillary  sinusitis  -     cefuroxime (CEFTIN) 500 MG tablet; Take 1 tablet by mouth 2 (Two) Times a Day.          BP elevated. He is going to purchase new BP monitor to check at home. He has been taking decongestant regularly for a few days.

## 2018-01-09 ENCOUNTER — TELEPHONE (OUTPATIENT)
Dept: FAMILY MEDICINE CLINIC | Facility: CLINIC | Age: 73
End: 2018-01-09

## 2018-01-09 NOTE — TELEPHONE ENCOUNTER
Does not need anything additional for that, just symptomatic treatment.  Would have to come in and be tested for diagnosis

## 2018-02-27 ENCOUNTER — OFFICE VISIT (OUTPATIENT)
Dept: FAMILY MEDICINE CLINIC | Facility: CLINIC | Age: 73
End: 2018-02-27

## 2018-02-27 VITALS
WEIGHT: 204 LBS | DIASTOLIC BLOOD PRESSURE: 82 MMHG | RESPIRATION RATE: 16 BRPM | SYSTOLIC BLOOD PRESSURE: 141 MMHG | TEMPERATURE: 98.3 F | BODY MASS INDEX: 29.2 KG/M2 | HEART RATE: 88 BPM | HEIGHT: 70 IN

## 2018-02-27 DIAGNOSIS — J01.00 ACUTE NON-RECURRENT MAXILLARY SINUSITIS: Primary | ICD-10-CM

## 2018-02-27 DIAGNOSIS — H81.03 MENIERE DISEASE, BILATERAL: ICD-10-CM

## 2018-02-27 PROCEDURE — 99214 OFFICE O/P EST MOD 30 MIN: CPT | Performed by: FAMILY MEDICINE

## 2018-02-27 RX ORDER — CEFUROXIME AXETIL 500 MG/1
500 TABLET ORAL 2 TIMES DAILY
Qty: 20 TABLET | Refills: 0 | Status: SHIPPED | OUTPATIENT
Start: 2018-02-27 | End: 2018-04-11

## 2018-02-27 RX ORDER — DIAZEPAM 5 MG/1
5 TABLET ORAL EVERY 12 HOURS PRN
Qty: 60 TABLET | Refills: 2 | Status: SHIPPED | OUTPATIENT
Start: 2018-02-27 | End: 2018-04-10 | Stop reason: SDUPTHER

## 2018-02-27 RX ORDER — NORTRIPTYLINE HYDROCHLORIDE 75 MG/1
CAPSULE ORAL
COMMUNITY
Start: 2018-01-08 | End: 2021-01-25 | Stop reason: DRUGHIGH

## 2018-02-27 NOTE — PROGRESS NOTES
Subjective   Kali Crespo is a 72 y.o. male.     History of Present Illness     Chief Complaint:   Chief Complaint   Patient presents with   • Meniere disease bilateral     [facility]   • Nasal Congestion   • Sinusitis       Kali Crespo 72 y.o. male who presents today for Medical Management of the below listed issues and medication refills.  he has a problem list of   Patient Active Problem List   Diagnosis   • Cervical pain   • Migraine without aura and without status migrainosus, not intractable   • Vertigo   • Benign essential HTN   • Acid reflux   • Encounter for pre-operative cardiovascular clearance   • Abnormal EKG   • Hyperlipidemia   • Erectile dysfunction   • Prostate cancer   • Meniere disease, bilateral   .  Since the last visit, he has overall felt well until Sunday, when he developed facial p/p, yellow d/c, PND, and mild cough. No f/c.  he has been compliant with   Current Outpatient Prescriptions:   •  cefuroxime (CEFTIN) 500 MG tablet, Take 1 tablet by mouth 2 (Two) Times a Day., Disp: 20 tablet, Rfl: 0  •  diazePAM (VALIUM) 5 MG tablet, Take 1 tablet by mouth Every 12 (Twelve) Hours As Needed (vertigo)., Disp: 60 tablet, Rfl: 2  •  doxycycline (MONODOX) 100 MG capsule, Take 1 capsule by mouth 2 (Two) Times a Day., Disp: 14 capsule, Rfl: 0  •  fluticasone (FLONASE) 50 MCG/ACT nasal spray, 1 spray into each nostril 2 (Two) Times a Day., Disp: 3 bottle, Rfl: 3  •  irbesartan (AVAPRO) 300 MG tablet, Take 1 tablet by mouth Daily., Disp: 90 tablet, Rfl: 1  •  loratadine (CLARITIN) 10 MG tablet, Take 10 mg by mouth daily., Disp: , Rfl:   •  Multiple Vitamins-Minerals (CENTRAL-KEVON FOR SENIORS PO), Take by mouth., Disp: , Rfl:   •  nortriptyline (PAMELOR) 75 MG capsule, , Disp: , Rfl:   •  Omega-3 Fatty Acids (FISH OIL) 1000 MG capsule capsule, Take 1,000 mg by mouth daily., Disp: , Rfl:   •  omeprazole (priLOSEC) 20 MG capsule, Take 1 capsule by mouth Daily., Disp: 90 capsule,  "Rfl: 1  •  sildenafil (REVATIO) 20 MG tablet, Take 2-3 tabs QD prn, Disp: 60 tablet, Rfl: 11  •  simvastatin (ZOCOR) 40 MG tablet, Take 1 tablet by mouth Every Night., Disp: 90 tablet, Rfl: 1  •  Thiamine HCl (VITAMIN B-1) 100 MG tablet, Take 500 mg by mouth daily., Disp: , Rfl: .  he denies medication side effects.    All of the chronic condition(s) listed above are stable w/o issues.    /82  Pulse 88  Temp 98.3 °F (36.8 °C) (Oral)   Resp 16  Ht 177.8 cm (70\")  Wt 92.5 kg (204 lb)  BMI 29.27 kg/m2    Results for orders placed or performed in visit on 09/06/17   Comprehensive metabolic panel   Result Value Ref Range    Glucose 96 65 - 99 mg/dL    BUN 13 8 - 23 mg/dL    Creatinine 1.04 0.76 - 1.27 mg/dL    eGFR Non African Am 70 >60 mL/min/1.73    eGFR African Am 85 >60 mL/min/1.73    BUN/Creatinine Ratio 12.5 7.0 - 25.0    Sodium 141 136 - 145 mmol/L    Potassium 4.3 3.5 - 5.2 mmol/L    Chloride 102 98 - 107 mmol/L    Total CO2 29.3 (H) 22.0 - 29.0 mmol/L    Calcium 9.1 8.6 - 10.5 mg/dL    Total Protein 6.3 6.0 - 8.5 g/dL    Albumin 4.20 3.50 - 5.20 g/dL    Globulin 2.1 gm/dL    A/G Ratio 2.0 g/dL    Total Bilirubin 0.7 0.1 - 1.2 mg/dL    Alkaline Phosphatase 96 39 - 117 U/L    AST (SGOT) 16 1 - 40 U/L    ALT (SGPT) 21 1 - 41 U/L   Lipid Panel With LDL/HDL Ratio   Result Value Ref Range    Total Cholesterol 179 0 - 200 mg/dL    Triglycerides 112 0 - 150 mg/dL    HDL Cholesterol 45 40 - 60 mg/dL    VLDL Cholesterol 22.4 5 - 40 mg/dL    LDL Cholesterol  112 (H) 0 - 100 mg/dL    LDL/HDL Ratio 2.48    TSH   Result Value Ref Range    TSH 4.120 0.270 - 4.200 mIU/mL   CBC and Differential   Result Value Ref Range    WBC 5.73 4.50 - 10.70 10*3/mm3    RBC 4.61 4.60 - 6.00 10*6/mm3    Hemoglobin 14.6 13.7 - 17.6 g/dL    Hematocrit 43.3 40.4 - 52.2 %    MCV 93.9 79.8 - 96.2 fL    MCH 31.7 27.0 - 32.7 pg    MCHC 33.7 32.6 - 36.4 g/dL    RDW 13.5 11.5 - 14.5 %    Platelets 235 140 - 500 10*3/mm3    Neutrophil Rel % " 63.5 42.7 - 76.0 %    Lymphocyte Rel % 25.3 19.6 - 45.3 %    Monocyte Rel % 5.8 5.0 - 12.0 %    Eosinophil Rel % 4.4 0.3 - 6.2 %    Basophil Rel % 0.5 0.0 - 1.5 %    Neutrophils Absolute 3.64 1.90 - 8.10 10*3/mm3    Lymphocytes Absolute 1.45 0.90 - 4.80 10*3/mm3    Monocytes Absolute 0.33 0.20 - 1.20 10*3/mm3    Eosinophils Absolute 0.25 0.00 - 0.70 10*3/mm3    Basophils Absolute 0.03 0.00 - 0.20 10*3/mm3    Immature Granulocyte Rel % 0.5 0.0 - 0.5 %    Immature Grans Absolute 0.03 0.00 - 0.03 10*3/mm3           The following portions of the patient's history were reviewed and updated as appropriate: allergies, current medications, past family history, past medical history, past social history, past surgical history and problem list.    Review of Systems   Constitutional: Negative for activity change, chills, fatigue and fever.   HENT: Positive for congestion, postnasal drip and sinus pressure.    Respiratory: Positive for cough. Negative for chest tightness and shortness of breath.    Cardiovascular: Negative for chest pain and palpitations.   Gastrointestinal: Negative for abdominal pain and nausea.   Endocrine: Negative for cold intolerance and polydipsia.   Psychiatric/Behavioral: Negative for behavioral problems and dysphoric mood. The patient is not nervous/anxious.    All other systems reviewed and are negative.      Objective   Physical Exam   Constitutional: He appears well-developed and well-nourished.   HENT:   Nose: Right sinus exhibits maxillary sinus tenderness. Left sinus exhibits maxillary sinus tenderness.   Neck: Neck supple. No thyromegaly present.   Cardiovascular: Normal rate and regular rhythm.    No murmur heard.  Pulmonary/Chest: Effort normal and breath sounds normal.   Abdominal: Bowel sounds are normal.   Psychiatric: He has a normal mood and affect. His behavior is normal.   Nursing note and vitals reviewed.    The patient has read and signed the [facility] Controlled Substance  Contract.  I will continue to see patient for regular follow up appointments.  They are well controlled on their medication.  KERVIN has been reviewed by me and is updated every 3 months. The patient is aware of the potential for addiction and dependence.    Assessment/Plan   Kali was seen today for meniere disease bilateral, nasal congestion and sinusitis.    Diagnoses and all orders for this visit:    Acute non-recurrent maxillary sinusitis  -     cefuroxime (CEFTIN) 500 MG tablet; Take 1 tablet by mouth 2 (Two) Times a Day.    Meniere disease, bilateral  -     diazePAM (VALIUM) 5 MG tablet; Take 1 tablet by mouth Every 12 (Twelve) Hours As Needed (vertigo).

## 2018-03-05 ENCOUNTER — TELEPHONE (OUTPATIENT)
Dept: FAMILY MEDICINE CLINIC | Facility: CLINIC | Age: 73
End: 2018-03-05

## 2018-03-05 DIAGNOSIS — E78.2 MIXED HYPERLIPIDEMIA: ICD-10-CM

## 2018-03-05 DIAGNOSIS — I10 BENIGN ESSENTIAL HTN: Primary | ICD-10-CM

## 2018-03-07 LAB
ALBUMIN SERPL-MCNC: 4.4 G/DL (ref 3.5–5.2)
ALBUMIN/GLOB SERPL: 1.8 G/DL
ALP SERPL-CCNC: 109 U/L (ref 39–117)
ALT SERPL-CCNC: 19 U/L (ref 1–41)
AST SERPL-CCNC: 19 U/L (ref 1–40)
BASOPHILS # BLD AUTO: 0.04 10*3/MM3 (ref 0–0.2)
BASOPHILS NFR BLD AUTO: 0.6 % (ref 0–1.5)
BILIRUB SERPL-MCNC: 0.6 MG/DL (ref 0.1–1.2)
BUN SERPL-MCNC: 16 MG/DL (ref 8–23)
BUN/CREAT SERPL: 16 (ref 7–25)
CALCIUM SERPL-MCNC: 9.3 MG/DL (ref 8.6–10.5)
CHLORIDE SERPL-SCNC: 101 MMOL/L (ref 98–107)
CHOLEST SERPL-MCNC: 189 MG/DL (ref 0–200)
CO2 SERPL-SCNC: 29.5 MMOL/L (ref 22–29)
CREAT SERPL-MCNC: 1 MG/DL (ref 0.76–1.27)
EOSINOPHIL # BLD AUTO: 0.29 10*3/MM3 (ref 0–0.7)
EOSINOPHIL NFR BLD AUTO: 4.3 % (ref 0.3–6.2)
ERYTHROCYTE [DISTWIDTH] IN BLOOD BY AUTOMATED COUNT: 13.5 % (ref 11.5–14.5)
GFR SERPLBLD CREATININE-BSD FMLA CKD-EPI: 73 ML/MIN/1.73
GFR SERPLBLD CREATININE-BSD FMLA CKD-EPI: 89 ML/MIN/1.73
GLOBULIN SER CALC-MCNC: 2.5 GM/DL
GLUCOSE SERPL-MCNC: 96 MG/DL (ref 65–99)
HCT VFR BLD AUTO: 45.5 % (ref 40.4–52.2)
HDLC SERPL-MCNC: 44 MG/DL (ref 40–60)
HGB BLD-MCNC: 15.5 G/DL (ref 13.7–17.6)
IMM GRANULOCYTES # BLD: 0.02 10*3/MM3 (ref 0–0.03)
IMM GRANULOCYTES NFR BLD: 0.3 % (ref 0–0.5)
LDLC SERPL CALC-MCNC: 123 MG/DL (ref 0–100)
LDLC/HDLC SERPL: 2.8 {RATIO}
LYMPHOCYTES # BLD AUTO: 1.66 10*3/MM3 (ref 0.9–4.8)
LYMPHOCYTES NFR BLD AUTO: 24.6 % (ref 19.6–45.3)
MCH RBC QN AUTO: 31.6 PG (ref 27–32.7)
MCHC RBC AUTO-ENTMCNC: 34.1 G/DL (ref 32.6–36.4)
MCV RBC AUTO: 92.7 FL (ref 79.8–96.2)
MONOCYTES # BLD AUTO: 0.38 10*3/MM3 (ref 0.2–1.2)
MONOCYTES NFR BLD AUTO: 5.6 % (ref 5–12)
NEUTROPHILS # BLD AUTO: 4.37 10*3/MM3 (ref 1.9–8.1)
NEUTROPHILS NFR BLD AUTO: 64.6 % (ref 42.7–76)
PLATELET # BLD AUTO: 240 10*3/MM3 (ref 140–500)
POTASSIUM SERPL-SCNC: 4.4 MMOL/L (ref 3.5–5.2)
PROT SERPL-MCNC: 6.9 G/DL (ref 6–8.5)
RBC # BLD AUTO: 4.91 10*6/MM3 (ref 4.6–6)
SODIUM SERPL-SCNC: 142 MMOL/L (ref 136–145)
TRIGL SERPL-MCNC: 108 MG/DL (ref 0–150)
TSH SERPL DL<=0.005 MIU/L-ACNC: 4.37 MIU/ML (ref 0.27–4.2)
VLDLC SERPL CALC-MCNC: 21.6 MG/DL (ref 5–40)
WBC # BLD AUTO: 6.76 10*3/MM3 (ref 4.5–10.7)

## 2018-04-11 ENCOUNTER — OFFICE VISIT (OUTPATIENT)
Dept: FAMILY MEDICINE CLINIC | Facility: CLINIC | Age: 73
End: 2018-04-11

## 2018-04-11 VITALS
SYSTOLIC BLOOD PRESSURE: 144 MMHG | TEMPERATURE: 97.9 F | HEART RATE: 84 BPM | DIASTOLIC BLOOD PRESSURE: 82 MMHG | BODY MASS INDEX: 29.63 KG/M2 | RESPIRATION RATE: 16 BRPM | WEIGHT: 207 LBS | HEIGHT: 70 IN

## 2018-04-11 DIAGNOSIS — E78.2 MIXED HYPERLIPIDEMIA: ICD-10-CM

## 2018-04-11 DIAGNOSIS — J30.1 CHRONIC SEASONAL ALLERGIC RHINITIS DUE TO POLLEN: ICD-10-CM

## 2018-04-11 DIAGNOSIS — H81.03 MENIERE DISEASE, BILATERAL: ICD-10-CM

## 2018-04-11 DIAGNOSIS — K21.9 GASTROESOPHAGEAL REFLUX DISEASE WITHOUT ESOPHAGITIS: ICD-10-CM

## 2018-04-11 DIAGNOSIS — I10 BENIGN ESSENTIAL HTN: ICD-10-CM

## 2018-04-11 PROCEDURE — 99214 OFFICE O/P EST MOD 30 MIN: CPT | Performed by: FAMILY MEDICINE

## 2018-04-11 RX ORDER — SIMVASTATIN 40 MG
40 TABLET ORAL NIGHTLY
Qty: 90 TABLET | Refills: 1 | Status: SHIPPED | OUTPATIENT
Start: 2018-04-11 | End: 2018-10-11

## 2018-04-11 RX ORDER — OMEPRAZOLE 20 MG/1
20 CAPSULE, DELAYED RELEASE ORAL DAILY
Qty: 90 CAPSULE | Refills: 1 | Status: SHIPPED | OUTPATIENT
Start: 2018-04-11 | End: 2018-04-11 | Stop reason: SDUPTHER

## 2018-04-11 RX ORDER — IRBESARTAN 300 MG/1
300 TABLET ORAL DAILY
Qty: 90 TABLET | Refills: 1 | Status: SHIPPED | OUTPATIENT
Start: 2018-04-11 | End: 2018-08-27 | Stop reason: SDUPTHER

## 2018-04-11 RX ORDER — EPINEPHRINE 0.3 MG/.3ML
0.3 INJECTION SUBCUTANEOUS ONCE
Qty: 1 EACH | Refills: 11 | Status: SHIPPED | OUTPATIENT
Start: 2018-04-11 | End: 2018-04-11

## 2018-04-11 RX ORDER — AMLODIPINE BESYLATE 2.5 MG/1
2.5 TABLET ORAL DAILY
Qty: 90 TABLET | Refills: 1 | Status: SHIPPED | OUTPATIENT
Start: 2018-04-11 | End: 2018-08-27 | Stop reason: SDUPTHER

## 2018-04-11 RX ORDER — FLUTICASONE PROPIONATE 50 MCG
2 SPRAY, SUSPENSION (ML) NASAL DAILY
Qty: 3 BOTTLE | Refills: 3 | Status: SHIPPED | OUTPATIENT
Start: 2018-04-11 | End: 2019-01-10 | Stop reason: SDUPTHER

## 2018-04-11 RX ORDER — IRBESARTAN 300 MG/1
300 TABLET ORAL DAILY
Qty: 90 TABLET | Refills: 1 | Status: SHIPPED | OUTPATIENT
Start: 2018-04-11 | End: 2018-04-11 | Stop reason: SDUPTHER

## 2018-04-11 RX ORDER — SIMVASTATIN 40 MG
40 TABLET ORAL NIGHTLY
Qty: 90 TABLET | Refills: 1 | Status: SHIPPED | OUTPATIENT
Start: 2018-04-11 | End: 2018-04-11 | Stop reason: SDUPTHER

## 2018-04-11 RX ORDER — OMEPRAZOLE 20 MG/1
20 CAPSULE, DELAYED RELEASE ORAL DAILY
Qty: 90 CAPSULE | Refills: 1 | Status: SHIPPED | OUTPATIENT
Start: 2018-04-11 | End: 2018-08-27 | Stop reason: SDUPTHER

## 2018-04-11 RX ORDER — DIAZEPAM 5 MG/1
5 TABLET ORAL EVERY 12 HOURS PRN
Qty: 60 TABLET | Refills: 2 | Status: SHIPPED | OUTPATIENT
Start: 2018-04-11 | End: 2018-10-11 | Stop reason: SDUPTHER

## 2018-04-11 RX ORDER — FLUTICASONE PROPIONATE 50 MCG
2 SPRAY, SUSPENSION (ML) NASAL DAILY
Qty: 3 BOTTLE | Refills: 3 | Status: SHIPPED | OUTPATIENT
Start: 2018-04-11 | End: 2018-04-11 | Stop reason: SDUPTHER

## 2018-04-11 NOTE — PROGRESS NOTES
Subjective   Kali Crespo is a 72 y.o. male.     History of Present Illness     Chief Complaint:   Chief Complaint   Patient presents with   • Hypertension     med refill - lab results   -k asper    • Hyperlipidemia   • Heartburn   • Anxiety       Kali Crespo 72 y.o. male who presents today for Medical Management of the below listed issues and medication refills.  he has a problem list of   Patient Active Problem List   Diagnosis   • Cervical pain   • Migraine without aura and without status migrainosus, not intractable   • Vertigo   • Benign essential HTN   • Acid reflux   • Encounter for pre-operative cardiovascular clearance   • Abnormal EKG   • Hyperlipidemia   • Erectile dysfunction   • Prostate cancer   • Meniere disease, bilateral   .  Since the last visit, he has overall felt well.  he has been compliant with   Current Outpatient Prescriptions:   •  fluticasone (FLONASE) 50 MCG/ACT nasal spray, 2 sprays into each nostril Daily., Disp: 3 bottle, Rfl: 3  •  irbesartan (AVAPRO) 300 MG tablet, Take 1 tablet by mouth Daily., Disp: 90 tablet, Rfl: 1  •  omeprazole (priLOSEC) 20 MG capsule, Take 1 capsule by mouth Daily., Disp: 90 capsule, Rfl: 1  •  sildenafil (REVATIO) 20 MG tablet, Take 2-3 tabs QD prn, Disp: 60 tablet, Rfl: 11  •  simvastatin (ZOCOR) 40 MG tablet, Take 1 tablet by mouth Every Night., Disp: 90 tablet, Rfl: 1  •  amLODIPine (NORVASC) 2.5 MG tablet, Take 1 tablet by mouth Daily., Disp: 90 tablet, Rfl: 1  •  diazePAM (VALIUM) 5 MG tablet, Take 1 tablet by mouth Every 12 (Twelve) Hours As Needed (vertigo)., Disp: 60 tablet, Rfl: 2  •  EPINEPHrine (EPIPEN 2-CYN) 0.3 MG/0.3ML solution auto-injector injection, Inject 0.3 mL into the shoulder, thigh, or buttocks 1 (One) Time for 1 dose., Disp: 1 each, Rfl: 11  •  loratadine (CLARITIN) 10 MG tablet, Take 10 mg by mouth daily., Disp: , Rfl:   •  Multiple Vitamins-Minerals (CENTRAL-KEVON FOR SENIORS PO), Take by mouth., Disp: , Rfl:   •   "nortriptyline (PAMELOR) 75 MG capsule, , Disp: , Rfl:   •  Omega-3 Fatty Acids (FISH OIL) 1000 MG capsule capsule, Take 1,000 mg by mouth daily., Disp: , Rfl:   •  Thiamine HCl (VITAMIN B-1) 100 MG tablet, Take 500 mg by mouth daily., Disp: , Rfl: .  he denies medication side effects.    All of the chronic condition(s) listed above are stable w/o issues.    /82   Pulse 84   Temp 97.9 °F (36.6 °C) (Oral)   Resp 16   Ht 177.8 cm (70\")   Wt 93.9 kg (207 lb)   BMI 29.70 kg/m²     Results for orders placed or performed in visit on 03/05/18   Comprehensive metabolic panel   Result Value Ref Range    Glucose 96 65 - 99 mg/dL    BUN 16 8 - 23 mg/dL    Creatinine 1.00 0.76 - 1.27 mg/dL    eGFR Non African Am 73 >60 mL/min/1.73    eGFR African Am 89 >60 mL/min/1.73    BUN/Creatinine Ratio 16.0 7.0 - 25.0    Sodium 142 136 - 145 mmol/L    Potassium 4.4 3.5 - 5.2 mmol/L    Chloride 101 98 - 107 mmol/L    Total CO2 29.5 (H) 22.0 - 29.0 mmol/L    Calcium 9.3 8.6 - 10.5 mg/dL    Total Protein 6.9 6.0 - 8.5 g/dL    Albumin 4.40 3.50 - 5.20 g/dL    Globulin 2.5 gm/dL    A/G Ratio 1.8 g/dL    Total Bilirubin 0.6 0.1 - 1.2 mg/dL    Alkaline Phosphatase 109 39 - 117 U/L    AST (SGOT) 19 1 - 40 U/L    ALT (SGPT) 19 1 - 41 U/L   Lipid Panel With LDL/HDL Ratio   Result Value Ref Range    Total Cholesterol 189 0 - 200 mg/dL    Triglycerides 108 0 - 150 mg/dL    HDL Cholesterol 44 40 - 60 mg/dL    VLDL Cholesterol 21.6 5 - 40 mg/dL    LDL Cholesterol  123 (H) 0 - 100 mg/dL    LDL/HDL Ratio 2.80    TSH   Result Value Ref Range    TSH 4.370 (H) 0.270 - 4.200 mIU/mL   CBC and Differential   Result Value Ref Range    WBC 6.76 4.50 - 10.70 10*3/mm3    RBC 4.91 4.60 - 6.00 10*6/mm3    Hemoglobin 15.5 13.7 - 17.6 g/dL    Hematocrit 45.5 40.4 - 52.2 %    MCV 92.7 79.8 - 96.2 fL    MCH 31.6 27.0 - 32.7 pg    MCHC 34.1 32.6 - 36.4 g/dL    RDW 13.5 11.5 - 14.5 %    Platelets 240 140 - 500 10*3/mm3    Neutrophil Rel % 64.6 42.7 - 76.0 %    " Lymphocyte Rel % 24.6 19.6 - 45.3 %    Monocyte Rel % 5.6 5.0 - 12.0 %    Eosinophil Rel % 4.3 0.3 - 6.2 %    Basophil Rel % 0.6 0.0 - 1.5 %    Neutrophils Absolute 4.37 1.90 - 8.10 10*3/mm3    Lymphocytes Absolute 1.66 0.90 - 4.80 10*3/mm3    Monocytes Absolute 0.38 0.20 - 1.20 10*3/mm3    Eosinophils Absolute 0.29 0.00 - 0.70 10*3/mm3    Basophils Absolute 0.04 0.00 - 0.20 10*3/mm3    Immature Granulocyte Rel % 0.3 0.0 - 0.5 %    Immature Grans Absolute 0.02 0.00 - 0.03 10*3/mm3           The following portions of the patient's history were reviewed and updated as appropriate: allergies, current medications, past family history, past medical history, past social history, past surgical history and problem list.    Review of Systems   Constitutional: Negative for activity change, chills, fatigue and fever.   Respiratory: Negative for cough and shortness of breath.    Cardiovascular: Negative for chest pain and palpitations.   Gastrointestinal: Negative for abdominal pain.   Endocrine: Negative for cold intolerance.   Psychiatric/Behavioral: Negative for behavioral problems and dysphoric mood. The patient is not nervous/anxious.        Objective   Physical Exam   Constitutional: He appears well-developed and well-nourished.   Neck: Neck supple. No thyromegaly present.   Cardiovascular: Normal rate and regular rhythm.    No murmur heard.  Pulmonary/Chest: Effort normal and breath sounds normal.   Abdominal: Bowel sounds are normal.   Psychiatric: He has a normal mood and affect. His behavior is normal.   Nursing note and vitals reviewed.  Labs reviewed with pt today during visit. All questions answered.    The patient has read and signed the [facility] Controlled Substance Contract.  I will continue to see patient for regular follow up appointments.  They are well controlled on their medication.  KERVIN has been reviewed by me and is updated every 3 months. The patient is aware of the potential for addiction and  dependence.      Assessment/Plan   Kali was seen today for hypertension, hyperlipidemia, heartburn and anxiety.    Diagnoses and all orders for this visit:    Benign essential HTN  Comments:  uncontrolled  Orders:  -     irbesartan (AVAPRO) 300 MG tablet; Take 1 tablet by mouth Daily.  -     amLODIPine (NORVASC) 2.5 MG tablet; Take 1 tablet by mouth Daily.    Meniere disease, bilateral  -     diazePAM (VALIUM) 5 MG tablet; Take 1 tablet by mouth Every 12 (Twelve) Hours As Needed (vertigo).    Chronic seasonal allergic rhinitis due to pollen  -     fluticasone (FLONASE) 50 MCG/ACT nasal spray; 2 sprays into each nostril Daily.    Gastroesophageal reflux disease without esophagitis  -     omeprazole (priLOSEC) 20 MG capsule; Take 1 capsule by mouth Daily.    Mixed hyperlipidemia  -     simvastatin (ZOCOR) 40 MG tablet; Take 1 tablet by mouth Every Night.    Other orders  -     EPINEPHrine (EPIPEN 2-CYN) 0.3 MG/0.3ML solution auto-injector injection; Inject 0.3 mL into the shoulder, thigh, or buttocks 1 (One) Time for 1 dose.

## 2018-08-03 DIAGNOSIS — K21.9 GASTROESOPHAGEAL REFLUX DISEASE WITHOUT ESOPHAGITIS: ICD-10-CM

## 2018-08-03 DIAGNOSIS — I10 BENIGN ESSENTIAL HTN: ICD-10-CM

## 2018-08-03 RX ORDER — OMEPRAZOLE 20 MG/1
20 CAPSULE, DELAYED RELEASE ORAL DAILY
Qty: 90 CAPSULE | OUTPATIENT
Start: 2018-08-03

## 2018-08-03 RX ORDER — AMLODIPINE BESYLATE 2.5 MG/1
2.5 TABLET ORAL DAILY
Qty: 90 TABLET | OUTPATIENT
Start: 2018-08-03

## 2018-08-03 RX ORDER — IRBESARTAN 300 MG/1
300 TABLET ORAL DAILY
Qty: 90 TABLET | OUTPATIENT
Start: 2018-08-03

## 2018-08-27 DIAGNOSIS — I10 BENIGN ESSENTIAL HTN: ICD-10-CM

## 2018-08-27 DIAGNOSIS — K21.9 GASTROESOPHAGEAL REFLUX DISEASE WITHOUT ESOPHAGITIS: ICD-10-CM

## 2018-08-27 RX ORDER — SIMVASTATIN 20 MG
TABLET ORAL
Qty: 90 TABLET | Refills: 0 | Status: SHIPPED | OUTPATIENT
Start: 2018-08-27 | End: 2018-10-11

## 2018-08-27 RX ORDER — OMEPRAZOLE 20 MG/1
20 CAPSULE, DELAYED RELEASE ORAL DAILY
Qty: 90 CAPSULE | Refills: 0 | Status: SHIPPED | OUTPATIENT
Start: 2018-08-27 | End: 2018-10-11 | Stop reason: SDUPTHER

## 2018-08-27 RX ORDER — IRBESARTAN 300 MG/1
300 TABLET ORAL DAILY
Qty: 90 TABLET | Refills: 0 | Status: SHIPPED | OUTPATIENT
Start: 2018-08-27 | End: 2018-10-11 | Stop reason: SDUPTHER

## 2018-08-27 RX ORDER — AMLODIPINE BESYLATE 2.5 MG/1
2.5 TABLET ORAL DAILY
Qty: 90 TABLET | Refills: 0 | Status: SHIPPED | OUTPATIENT
Start: 2018-08-27 | End: 2018-10-11 | Stop reason: SDUPTHER

## 2018-09-11 ENCOUNTER — TELEPHONE (OUTPATIENT)
Dept: FAMILY MEDICINE CLINIC | Facility: CLINIC | Age: 73
End: 2018-09-11

## 2018-09-11 DIAGNOSIS — I10 BENIGN ESSENTIAL HTN: Primary | ICD-10-CM

## 2018-09-11 DIAGNOSIS — E78.2 MIXED HYPERLIPIDEMIA: ICD-10-CM

## 2018-09-12 LAB
ALBUMIN SERPL-MCNC: 4.3 G/DL (ref 3.5–5.2)
ALBUMIN/GLOB SERPL: 2.2 G/DL
ALP SERPL-CCNC: 107 U/L (ref 39–117)
ALT SERPL-CCNC: 16 U/L (ref 1–41)
AST SERPL-CCNC: 13 U/L (ref 1–40)
BASOPHILS # BLD AUTO: 0.03 10*3/MM3 (ref 0–0.2)
BASOPHILS NFR BLD AUTO: 0.6 % (ref 0–1.5)
BILIRUB SERPL-MCNC: 0.4 MG/DL (ref 0.1–1.2)
BUN SERPL-MCNC: 14 MG/DL (ref 8–23)
BUN/CREAT SERPL: 14.6 (ref 7–25)
CALCIUM SERPL-MCNC: 9.2 MG/DL (ref 8.6–10.5)
CHLORIDE SERPL-SCNC: 104 MMOL/L (ref 98–107)
CHOLEST SERPL-MCNC: 178 MG/DL (ref 0–200)
CO2 SERPL-SCNC: 29 MMOL/L (ref 22–29)
CREAT SERPL-MCNC: 0.96 MG/DL (ref 0.76–1.27)
EOSINOPHIL # BLD AUTO: 0.15 10*3/MM3 (ref 0–0.7)
EOSINOPHIL NFR BLD AUTO: 3.2 % (ref 0.3–6.2)
ERYTHROCYTE [DISTWIDTH] IN BLOOD BY AUTOMATED COUNT: 12.8 % (ref 11.5–14.5)
GLOBULIN SER CALC-MCNC: 2 GM/DL
GLUCOSE SERPL-MCNC: 92 MG/DL (ref 65–99)
HCT VFR BLD AUTO: 43.5 % (ref 40.4–52.2)
HDLC SERPL-MCNC: 43 MG/DL (ref 40–60)
HGB BLD-MCNC: 14.2 G/DL (ref 13.7–17.6)
IMM GRANULOCYTES # BLD: 0 10*3/MM3 (ref 0–0.03)
IMM GRANULOCYTES NFR BLD: 0 % (ref 0–0.5)
LDLC SERPL CALC-MCNC: 118 MG/DL (ref 0–100)
LDLC/HDLC SERPL: 2.75 {RATIO}
LYMPHOCYTES # BLD AUTO: 1.16 10*3/MM3 (ref 0.9–4.8)
LYMPHOCYTES NFR BLD AUTO: 24.8 % (ref 19.6–45.3)
MCH RBC QN AUTO: 30.6 PG (ref 27–32.7)
MCHC RBC AUTO-ENTMCNC: 32.6 G/DL (ref 32.6–36.4)
MCV RBC AUTO: 93.8 FL (ref 79.8–96.2)
MONOCYTES # BLD AUTO: 0.28 10*3/MM3 (ref 0.2–1.2)
MONOCYTES NFR BLD AUTO: 6 % (ref 5–12)
NEUTROPHILS # BLD AUTO: 3.06 10*3/MM3 (ref 1.9–8.1)
NEUTROPHILS NFR BLD AUTO: 65.4 % (ref 42.7–76)
PLATELET # BLD AUTO: 220 10*3/MM3 (ref 140–500)
POTASSIUM SERPL-SCNC: 4.6 MMOL/L (ref 3.5–5.2)
PROT SERPL-MCNC: 6.3 G/DL (ref 6–8.5)
RBC # BLD AUTO: 4.64 10*6/MM3 (ref 4.6–6)
SODIUM SERPL-SCNC: 142 MMOL/L (ref 136–145)
TRIGL SERPL-MCNC: 84 MG/DL (ref 0–150)
TSH SERPL DL<=0.005 MIU/L-ACNC: 3.67 MIU/ML (ref 0.27–4.2)
VLDLC SERPL CALC-MCNC: 16.8 MG/DL (ref 5–40)
WBC # BLD AUTO: 4.68 10*3/MM3 (ref 4.5–10.7)

## 2018-10-11 ENCOUNTER — OFFICE VISIT (OUTPATIENT)
Dept: FAMILY MEDICINE CLINIC | Facility: CLINIC | Age: 73
End: 2018-10-11

## 2018-10-11 VITALS
WEIGHT: 202 LBS | SYSTOLIC BLOOD PRESSURE: 121 MMHG | RESPIRATION RATE: 16 BRPM | HEART RATE: 86 BPM | TEMPERATURE: 98 F | BODY MASS INDEX: 28.92 KG/M2 | DIASTOLIC BLOOD PRESSURE: 79 MMHG | HEIGHT: 70 IN

## 2018-10-11 DIAGNOSIS — H81.03 MENIERE DISEASE, BILATERAL: ICD-10-CM

## 2018-10-11 DIAGNOSIS — I10 BENIGN ESSENTIAL HTN: ICD-10-CM

## 2018-10-11 DIAGNOSIS — E78.2 MIXED HYPERLIPIDEMIA: ICD-10-CM

## 2018-10-11 DIAGNOSIS — Z23 IMMUNIZATION DUE: ICD-10-CM

## 2018-10-11 DIAGNOSIS — K21.9 GASTROESOPHAGEAL REFLUX DISEASE WITHOUT ESOPHAGITIS: ICD-10-CM

## 2018-10-11 DIAGNOSIS — Z00.00 MEDICARE ANNUAL WELLNESS VISIT, SUBSEQUENT: Primary | ICD-10-CM

## 2018-10-11 PROCEDURE — G0439 PPPS, SUBSEQ VISIT: HCPCS | Performed by: FAMILY MEDICINE

## 2018-10-11 PROCEDURE — 90662 IIV NO PRSV INCREASED AG IM: CPT | Performed by: FAMILY MEDICINE

## 2018-10-11 PROCEDURE — G0008 ADMIN INFLUENZA VIRUS VAC: HCPCS | Performed by: FAMILY MEDICINE

## 2018-10-11 PROCEDURE — 99214 OFFICE O/P EST MOD 30 MIN: CPT | Performed by: FAMILY MEDICINE

## 2018-10-11 RX ORDER — IRBESARTAN 300 MG/1
300 TABLET ORAL DAILY
Qty: 90 TABLET | Refills: 1 | Status: SHIPPED | OUTPATIENT
Start: 2018-10-11 | End: 2019-01-10 | Stop reason: SDUPTHER

## 2018-10-11 RX ORDER — SIMVASTATIN 20 MG
20 TABLET ORAL NIGHTLY
Qty: 90 TABLET | Refills: 1 | Status: CANCELLED | OUTPATIENT
Start: 2018-10-11

## 2018-10-11 RX ORDER — SIMVASTATIN 40 MG
40 TABLET ORAL NIGHTLY
COMMUNITY
End: 2018-10-11 | Stop reason: SDUPTHER

## 2018-10-11 RX ORDER — AMLODIPINE BESYLATE 2.5 MG/1
2.5 TABLET ORAL DAILY
Qty: 90 TABLET | Refills: 1 | Status: SHIPPED | OUTPATIENT
Start: 2018-10-11 | End: 2019-01-10 | Stop reason: SDUPTHER

## 2018-10-11 RX ORDER — DIAZEPAM 5 MG/1
5 TABLET ORAL EVERY 12 HOURS PRN
Qty: 60 TABLET | Refills: 2 | Status: SHIPPED | OUTPATIENT
Start: 2018-10-11 | End: 2019-01-10 | Stop reason: SDUPTHER

## 2018-10-11 RX ORDER — OMEPRAZOLE 20 MG/1
20 CAPSULE, DELAYED RELEASE ORAL DAILY
Qty: 90 CAPSULE | Refills: 1 | Status: SHIPPED | OUTPATIENT
Start: 2018-10-11 | End: 2019-01-10 | Stop reason: SDUPTHER

## 2018-10-11 RX ORDER — SIMVASTATIN 40 MG
40 TABLET ORAL NIGHTLY
Qty: 90 TABLET | Refills: 1 | Status: SHIPPED | OUTPATIENT
Start: 2018-10-11 | End: 2019-01-10 | Stop reason: SDUPTHER

## 2018-10-11 NOTE — PROGRESS NOTES
QUICK REFERENCE INFORMATION:  The ABCs of the Annual Wellness Visit    Subsequent Medicare Wellness Visit    HEALTH RISK ASSESSMENT    1945    Recent Hospitalizations:  No hospitalization(s) within the last year..        Current Medical Providers:  Patient Care Team:  Nathan Arango MD as PCP - General  Nathan Arango MD as PCP - Family Medicine  Nathan Arango MD as PCP - Claims Attributed  Nena Ramey APRN (Dermatology)        Smoking Status:  History   Smoking Status   • Former Smoker   • Packs/day: 0.25   • Types: Cigarettes   • Start date: 1965   • Quit date: 1969   Smokeless Tobacco   • Never Used     Comment: 40 years ago       Alcohol Consumption:  History   Alcohol Use No     Comment: seldom       Depression Screen:   PHQ-2/PHQ-9 Depression Screening 10/11/2018   Little interest or pleasure in doing things 0   Feeling down, depressed, or hopeless 0   Trouble falling or staying asleep, or sleeping too much -   Feeling tired or having little energy -   Poor appetite or overeating -   Feeling bad about yourself - or that you are a failure or have let yourself or your family down -   Trouble concentrating on things, such as reading the newspaper or watching television -   Moving or speaking so slowly that other people could have noticed. Or the opposite - being so fidgety or restless that you have been moving around a lot more than usual -   Thoughts that you would be better off dead, or of hurting yourself in some way -   Total Score 0       Health Habits and Functional and Cognitive Screening:  Functional & Cognitive Status 10/11/2018   Do you have difficulty preparing food and eating? No   Do you have difficulty bathing yourself, getting dressed or grooming yourself? No   Do you have difficulty using the toilet? No   Do you have difficulty moving around from place to place? No   Do you have trouble with steps or getting out of a bed or a chair? No   In the past year have you fallen or  experienced a near fall? No   Current Diet Well Balanced Diet   Dental Exam Up to date   Eye Exam Up to date   Exercise (times per week) 4 times per week   Current Exercise Activities Include Yard Work   Do you need help using the phone?  No   Are you deaf or do you have serious difficulty hearing?  No   Do you need help with transportation? No   Do you need help shopping? No   Do you need help preparing meals?  No   Do you need help with housework?  No   Do you need help with laundry? No   Do you need help taking your medications? No   Do you need help managing money? No   Do you ever drive or ride in a car without wearing a seat belt? No   Have you felt unusual stress, anger or loneliness in the last month? No   Who do you live with? Spouse   If you need help, do you have trouble finding someone available to you? Yes   Have you been bothered in the last four weeks by sexual problems? No   Do you have difficulty concentrating, remembering or making decisions? No           Does the patient have evidence of cognitive impairment? No    Aspirin use counseling: Does not need ASA (and currently is not on it)      Recent Lab Results:  CMP:  Lab Results   Component Value Date    GLU 92 09/12/2018    BUN 14 09/12/2018    CREATININE 0.96 09/12/2018    EGFRIFNONA 77 09/12/2018    EGFRIFAFRI 93 09/12/2018    BCR 14.6 09/12/2018     09/12/2018    K 4.6 09/12/2018    CO2 29.0 09/12/2018    CALCIUM 9.2 09/12/2018    PROTENTOTREF 6.3 09/12/2018    ALBUMIN 4.30 09/12/2018    LABGLOBREF 2.0 09/12/2018    LABIL2 2.2 09/12/2018    BILITOT 0.4 09/12/2018    ALKPHOS 107 09/12/2018    AST 13 09/12/2018    ALT 16 09/12/2018     Lipid Panel:  Lab Results   Component Value Date    TRIG 84 09/12/2018    HDL 43 09/12/2018    VLDL 16.8 09/12/2018    LDLHDL 2.75 09/12/2018     HbA1c:       Visual Acuity:  No exam data present    Age-appropriate Screening Schedule:  Refer to the list below for future screening recommendations based on  patient's age, sex and/or medical conditions. Orders for these recommended tests are listed in the plan section. The patient has been provided with a written plan.    Health Maintenance   Topic Date Due   • PROSTATE CANCER SCREENING  03/22/2018   • ZOSTER VACCINE (2 of 2) 04/24/2019 (Originally 2/26/2011)   • LIPID PANEL  09/12/2019   • COLONOSCOPY  05/28/2025   • INFLUENZA VACCINE  Completed   • PNEUMOCOCCAL VACCINES (65+ LOW/MEDIUM RISK)  Completed   • TDAP/TD VACCINES  Excluded        Subjective   History of Present Illness    Kali Crespo is a 73 y.o. male who presents for an Subsequent Wellness Visit.    The following portions of the patient's history were reviewed and updated as appropriate: allergies, current medications, past family history, past medical history, past social history, past surgical history and problem list.    Outpatient Medications Prior to Visit   Medication Sig Dispense Refill   • amLODIPine (NORVASC) 2.5 MG tablet TAKE 1 TABLET BY MOUTH  DAILY 90 tablet 0   • diazePAM (VALIUM) 5 MG tablet Take 1 tablet by mouth Every 12 (Twelve) Hours As Needed (vertigo). 60 tablet 2   • irbesartan (AVAPRO) 300 MG tablet TAKE 1 TABLET BY MOUTH  DAILY 90 tablet 0   • omeprazole (priLOSEC) 20 MG capsule TAKE 1 CAPSULE BY MOUTH  DAILY 90 capsule 0   • fluticasone (FLONASE) 50 MCG/ACT nasal spray 2 sprays into each nostril Daily. 3 bottle 3   • loratadine (CLARITIN) 10 MG tablet Take 10 mg by mouth daily.     • Multiple Vitamins-Minerals (CENTRAL-KEVON FOR SENIORS PO) Take by mouth.     • nortriptyline (PAMELOR) 75 MG capsule      • Omega-3 Fatty Acids (FISH OIL) 1000 MG capsule capsule Take 1,000 mg by mouth daily.     • sildenafil (REVATIO) 20 MG tablet Take 2-3 tabs QD prn 60 tablet 11   • Thiamine HCl (VITAMIN B-1) 100 MG tablet Take 500 mg by mouth daily.     • simvastatin (ZOCOR) 20 MG tablet TAKE 1 TABLET BY MOUTH  NIGHTLY 90 tablet 0   • simvastatin (ZOCOR) 40 MG tablet Take 1 tablet by mouth  "Every Night. 90 tablet 1     No facility-administered medications prior to visit.        Patient Active Problem List   Diagnosis   • Cervical pain   • Migraine without aura and without status migrainosus, not intractable   • Vertigo   • Benign essential HTN   • Acid reflux   • Encounter for pre-operative cardiovascular clearance   • Abnormal EKG   • Hyperlipidemia   • Erectile dysfunction   • Prostate cancer (CMS/[facility])   • Meniere disease, bilateral       Advance Care Planning:  has an advance directive - a copy HAS NOT been provided    Identification of Risk Factors:  Risk factors include: cardiovascular risk.    Review of Systems    Compared to one year ago, the patient feels his physical health is the same.  Compared to one year ago, the patient feels his mental health is the same.    Objective     Physical Exam    Vitals:    10/11/18 1016   BP: 121/79   Pulse: 86   Resp: 16   Temp: 98 °F (36.7 °C)   TempSrc: Oral   Weight: 91.6 kg (202 lb)   Height: 177.8 cm (70\")   PainSc: 0-No pain       Patient's Body mass index is 28.98 kg/m². BMI is within normal parameters. No follow-up required.      Assessment/Plan   Patient Self-Management and Personalized Health Advice  The patient has been provided with information about: diet, exercise and weight management and preventive services including:   · Exercise counseling provided, Fall Risk assessment done, Nutrition counseling provided.    Visit Diagnoses:    ICD-10-CM ICD-9-CM   1. Medicare annual wellness visit, subsequent Z00.00 V70.0   2. Benign essential HTN I10 401.1   3. Meniere disease, bilateral H81.03 386.00   4. Gastroesophageal reflux disease without esophagitis K21.9 530.81   5. Mixed hyperlipidemia E78.2 272.2   6. Immunization due Z23 V05.9       Orders Placed This Encounter   Procedures   • Fluzone High Dose =>65Years       Outpatient Encounter Prescriptions as of 10/11/2018   Medication Sig Dispense Refill   • simvastatin (ZOCOR) 40 MG tablet Take 1 tablet " by mouth Every Night. 90 tablet 1   • [DISCONTINUED] amLODIPine (NORVASC) 2.5 MG tablet TAKE 1 TABLET BY MOUTH  DAILY 90 tablet 0   • [DISCONTINUED] diazePAM (VALIUM) 5 MG tablet Take 1 tablet by mouth Every 12 (Twelve) Hours As Needed (vertigo). 60 tablet 2   • [DISCONTINUED] irbesartan (AVAPRO) 300 MG tablet TAKE 1 TABLET BY MOUTH  DAILY 90 tablet 0   • [DISCONTINUED] omeprazole (priLOSEC) 20 MG capsule TAKE 1 CAPSULE BY MOUTH  DAILY 90 capsule 0   • [DISCONTINUED] simvastatin (ZOCOR) 40 MG tablet Take 40 mg by mouth Every Night.     • amLODIPine (NORVASC) 2.5 MG tablet Take 1 tablet by mouth Daily. 90 tablet 1   • diazePAM (VALIUM) 5 MG tablet Take 1 tablet by mouth Every 12 (Twelve) Hours As Needed (vertigo). 60 tablet 2   • fluticasone (FLONASE) 50 MCG/ACT nasal spray 2 sprays into each nostril Daily. 3 bottle 3   • irbesartan (AVAPRO) 300 MG tablet Take 1 tablet by mouth Daily. 90 tablet 1   • loratadine (CLARITIN) 10 MG tablet Take 10 mg by mouth daily.     • Multiple Vitamins-Minerals (CENTRAL-KEVON FOR SENIORS PO) Take by mouth.     • nortriptyline (PAMELOR) 75 MG capsule      • Omega-3 Fatty Acids (FISH OIL) 1000 MG capsule capsule Take 1,000 mg by mouth daily.     • omeprazole (priLOSEC) 20 MG capsule Take 1 capsule by mouth Daily. 90 capsule 1   • sildenafil (REVATIO) 20 MG tablet Take 2-3 tabs QD prn 60 tablet 11   • Thiamine HCl (VITAMIN B-1) 100 MG tablet Take 500 mg by mouth daily.     • [DISCONTINUED] simvastatin (ZOCOR) 20 MG tablet TAKE 1 TABLET BY MOUTH  NIGHTLY 90 tablet 0   • [DISCONTINUED] simvastatin (ZOCOR) 40 MG tablet Take 1 tablet by mouth Every Night. 90 tablet 1     No facility-administered encounter medications on file as of 10/11/2018.        Reviewed use of high risk medication in the elderly: not applicable  Reviewed for potential of harmful drug interactions in the elderly: not applicable    Follow Up:  Return in about 3 months (around 1/11/2019) for Recheck.     An After Visit  Summary and PPPS with all of these plans were given to the patient.

## 2018-10-11 NOTE — PATIENT INSTRUCTIONS
Medicare Wellness  Personal Prevention Plan of Service     Date of Office Visit:  10/11/2018  Encounter Provider:  Nathan Arango MD  Place of Service:  [facility]  Patient Name: Kali Crespo  :  1945    As part of the Medicare Wellness portion of your visit today, we are providing you with this personalized preventive plan of services (PPPS). This plan is based upon recommendations of the [place] Preventive Services Task Force (USPSTF) and the Advisory Committee on Immunization Practices (ACIP).    This lists the preventive care services that should be considered, and provides dates of when you are due. Items listed as completed are up-to-date and do not require any further intervention.    Health Maintenance   Topic Date Due   • AAA SCREEN (ONE-TIME)  2016   • PROSTATE CANCER SCREENING  2018   • COLOGUARD  2018   • ZOSTER VACCINE (2 of 2) 2019 (Originally 2011)   • LIPID PANEL  2019   • MEDICARE ANNUAL WELLNESS  10/11/2019   • COLONOSCOPY  2025   • INFLUENZA VACCINE  Completed   • PNEUMOCOCCAL VACCINES (65+ LOW/MEDIUM RISK)  Completed   • HEPATITIS C SCREENING  Excluded   • TDAP/TD VACCINES  Excluded       Orders Placed This Encounter   Procedures   • Fluzone High Dose =>65Years       Return in about 3 months (around 2019) for Recheck.

## 2018-10-11 NOTE — PROGRESS NOTES
Subjective   Kali Crespo is a 73 y.o. male.     History of Present Illness     Chief Complaint:   Chief Complaint   Patient presents with   • MEDICARE WELNESS   • Hypertension     MED REFILL - KERVIN    • Hyperlipidemia   • Anxiety   • Heartburn   • Immunizations     FLU SHOT GIVEN LEFT DELTOID TODAY        Kali Crespo 73 y.o. male who presents today for Medical Management of the below listed issues and medication refills.  he has a problem list of   Patient Active Problem List   Diagnosis   • Cervical pain   • Migraine without aura and without status migrainosus, not intractable   • Vertigo   • Benign essential HTN   • Acid reflux   • Encounter for pre-operative cardiovascular clearance   • Abnormal EKG   • Hyperlipidemia   • Erectile dysfunction   • Prostate cancer (CMS/HCC)   • Meniere disease, bilateral   .  Since the last visit, he has overall felt well.  he has been compliant with   Current Outpatient Prescriptions:   •  simvastatin (ZOCOR) 40 MG tablet, Take 1 tablet by mouth Every Night., Disp: 90 tablet, Rfl: 1  •  amLODIPine (NORVASC) 2.5 MG tablet, Take 1 tablet by mouth Daily., Disp: 90 tablet, Rfl: 1  •  diazePAM (VALIUM) 5 MG tablet, Take 1 tablet by mouth Every 12 (Twelve) Hours As Needed (vertigo)., Disp: 60 tablet, Rfl: 2  •  fluticasone (FLONASE) 50 MCG/ACT nasal spray, 2 sprays into each nostril Daily., Disp: 3 bottle, Rfl: 3  •  irbesartan (AVAPRO) 300 MG tablet, Take 1 tablet by mouth Daily., Disp: 90 tablet, Rfl: 1  •  loratadine (CLARITIN) 10 MG tablet, Take 10 mg by mouth daily., Disp: , Rfl:   •  Multiple Vitamins-Minerals (CENTRAL-KEVON FOR SENIORS PO), Take by mouth., Disp: , Rfl:   •  nortriptyline (PAMELOR) 75 MG capsule, , Disp: , Rfl:   •  Omega-3 Fatty Acids (FISH OIL) 1000 MG capsule capsule, Take 1,000 mg by mouth daily., Disp: , Rfl:   •  omeprazole (priLOSEC) 20 MG capsule, Take 1 capsule by mouth Daily., Disp: 90 capsule, Rfl: 1  •  sildenafil (REVATIO) 20 MG tablet,  "Take 2-3 tabs QD prn, Disp: 60 tablet, Rfl: 11  •  Thiamine HCl (VITAMIN B-1) 100 MG tablet, Take 500 mg by mouth daily., Disp: , Rfl: .  he denies medication side effects.    All of the chronic condition(s) listed above are stable w/o issues.    /79   Pulse 86   Temp 98 °F (36.7 °C) (Oral)   Resp 16   Ht 177.8 cm (70\")   Wt 91.6 kg (202 lb)   BMI 28.98 kg/m²     Results for orders placed or performed in visit on 09/11/18   Comprehensive metabolic panel   Result Value Ref Range    Glucose 92 65 - 99 mg/dL    BUN 14 8 - 23 mg/dL    Creatinine 0.96 0.76 - 1.27 mg/dL    eGFR Non African Am 77 >60 mL/min/1.73    eGFR African Am 93 >60 mL/min/1.73    BUN/Creatinine Ratio 14.6 7.0 - 25.0    Sodium 142 136 - 145 mmol/L    Potassium 4.6 3.5 - 5.2 mmol/L    Chloride 104 98 - 107 mmol/L    Total CO2 29.0 22.0 - 29.0 mmol/L    Calcium 9.2 8.6 - 10.5 mg/dL    Total Protein 6.3 6.0 - 8.5 g/dL    Albumin 4.30 3.50 - 5.20 g/dL    Globulin 2.0 gm/dL    A/G Ratio 2.2 g/dL    Total Bilirubin 0.4 0.1 - 1.2 mg/dL    Alkaline Phosphatase 107 39 - 117 U/L    AST (SGOT) 13 1 - 40 U/L    ALT (SGPT) 16 1 - 41 U/L   Lipid Panel With LDL/HDL Ratio   Result Value Ref Range    Total Cholesterol 178 0 - 200 mg/dL    Triglycerides 84 0 - 150 mg/dL    HDL Cholesterol 43 40 - 60 mg/dL    VLDL Cholesterol 16.8 5 - 40 mg/dL    LDL Cholesterol  118 (H) 0 - 100 mg/dL    LDL/HDL Ratio 2.75    TSH   Result Value Ref Range    TSH 3.670 0.270 - 4.200 mIU/mL   CBC and Differential   Result Value Ref Range    WBC 4.68 4.50 - 10.70 10*3/mm3    RBC 4.64 4.60 - 6.00 10*6/mm3    Hemoglobin 14.2 13.7 - 17.6 g/dL    Hematocrit 43.5 40.4 - 52.2 %    MCV 93.8 79.8 - 96.2 fL    MCH 30.6 27.0 - 32.7 pg    MCHC 32.6 32.6 - 36.4 g/dL    RDW 12.8 11.5 - 14.5 %    Platelets 220 140 - 500 10*3/mm3    Neutrophil Rel % 65.4 42.7 - 76.0 %    Lymphocyte Rel % 24.8 19.6 - 45.3 %    Monocyte Rel % 6.0 5.0 - 12.0 %    Eosinophil Rel % 3.2 0.3 - 6.2 %    Basophil Rel % " 0.6 0.0 - 1.5 %    Neutrophils Absolute 3.06 1.90 - 8.10 10*3/mm3    Lymphocytes Absolute 1.16 0.90 - 4.80 10*3/mm3    Monocytes Absolute 0.28 0.20 - 1.20 10*3/mm3    Eosinophils Absolute 0.15 0.00 - 0.70 10*3/mm3    Basophils Absolute 0.03 0.00 - 0.20 10*3/mm3    Immature Granulocyte Rel % 0.0 0.0 - 0.5 %    Immature Grans Absolute 0.00 0.00 - 0.03 10*3/mm3           The following portions of the patient's history were reviewed and updated as appropriate: allergies, current medications, past family history, past medical history, past social history, past surgical history and problem list.    Review of Systems   Constitutional: Negative for activity change, chills, fatigue and fever.   Respiratory: Negative for cough and shortness of breath.    Cardiovascular: Negative for chest pain and palpitations.   Gastrointestinal: Negative for abdominal pain.   Endocrine: Negative for cold intolerance.   Psychiatric/Behavioral: Negative for behavioral problems and dysphoric mood. The patient is not nervous/anxious.        Objective   Physical Exam   Constitutional: He appears well-developed and well-nourished.   Neck: Neck supple. No thyromegaly present.   Cardiovascular: Normal rate and regular rhythm.    No murmur heard.  Pulmonary/Chest: Effort normal and breath sounds normal.   Abdominal: Bowel sounds are normal. There is no tenderness.   Psychiatric: He has a normal mood and affect. His behavior is normal.   Nursing note and vitals reviewed.  Labs reviewed with pt today during visit. All questions answered.    The patient has read and signed the Psychiatric Controlled Substance Contract.  I will continue to see patient for regular follow up appointments.  They are well controlled on their medication.  KERVIN has been reviewed by me and is updated every 3 months. The patient is aware of the potential for addiction and dependence.      Assessment/Plan   Kali was seen today for medicare welness, hypertension,  hyperlipidemia, anxiety, heartburn and immunizations.    Diagnoses and all orders for this visit:    Medicare annual wellness visit, subsequent    Benign essential HTN  -     amLODIPine (NORVASC) 2.5 MG tablet; Take 1 tablet by mouth Daily.  -     irbesartan (AVAPRO) 300 MG tablet; Take 1 tablet by mouth Daily.    Meniere disease, bilateral  -     diazePAM (VALIUM) 5 MG tablet; Take 1 tablet by mouth Every 12 (Twelve) Hours As Needed (vertigo).    Gastroesophageal reflux disease without esophagitis  -     omeprazole (priLOSEC) 20 MG capsule; Take 1 capsule by mouth Daily.    Mixed hyperlipidemia    Immunization due  -     simvastatin (ZOCOR) 40 MG tablet; Take 1 tablet by mouth Every Night.  -     Fluzone High Dose =>65Years    Other orders  -     Cancel: simvastatin (ZOCOR) 20 MG tablet; Take 1 tablet by mouth Every Night.

## 2019-01-03 DIAGNOSIS — J30.1 CHRONIC SEASONAL ALLERGIC RHINITIS DUE TO POLLEN: ICD-10-CM

## 2019-01-03 DIAGNOSIS — I10 BENIGN ESSENTIAL HTN: ICD-10-CM

## 2019-01-03 DIAGNOSIS — K21.9 GASTROESOPHAGEAL REFLUX DISEASE WITHOUT ESOPHAGITIS: ICD-10-CM

## 2019-01-03 RX ORDER — OMEPRAZOLE 20 MG/1
20 CAPSULE, DELAYED RELEASE ORAL DAILY
Qty: 90 CAPSULE | Refills: 1 | OUTPATIENT
Start: 2019-01-03

## 2019-01-03 RX ORDER — IRBESARTAN 300 MG/1
300 TABLET ORAL DAILY
Qty: 90 TABLET | Refills: 1 | OUTPATIENT
Start: 2019-01-03

## 2019-01-03 RX ORDER — FLUTICASONE PROPIONATE 50 MCG
SPRAY, SUSPENSION (ML) NASAL
Qty: 48 G | OUTPATIENT
Start: 2019-01-03

## 2019-01-03 RX ORDER — AMLODIPINE BESYLATE 2.5 MG/1
2.5 TABLET ORAL DAILY
Qty: 90 TABLET | Refills: 1 | OUTPATIENT
Start: 2019-01-03

## 2019-01-04 DIAGNOSIS — J30.1 CHRONIC SEASONAL ALLERGIC RHINITIS DUE TO POLLEN: ICD-10-CM

## 2019-01-04 DIAGNOSIS — K21.9 GASTROESOPHAGEAL REFLUX DISEASE WITHOUT ESOPHAGITIS: ICD-10-CM

## 2019-01-04 DIAGNOSIS — I10 BENIGN ESSENTIAL HTN: ICD-10-CM

## 2019-01-04 RX ORDER — OMEPRAZOLE 20 MG/1
20 CAPSULE, DELAYED RELEASE ORAL DAILY
Qty: 90 CAPSULE | Refills: 1 | OUTPATIENT
Start: 2019-01-04

## 2019-01-04 RX ORDER — IRBESARTAN 300 MG/1
300 TABLET ORAL DAILY
Qty: 90 TABLET | Refills: 1 | OUTPATIENT
Start: 2019-01-04

## 2019-01-04 RX ORDER — FLUTICASONE PROPIONATE 50 MCG
SPRAY, SUSPENSION (ML) NASAL
Qty: 48 G | OUTPATIENT
Start: 2019-01-04

## 2019-01-04 RX ORDER — AMLODIPINE BESYLATE 2.5 MG/1
2.5 TABLET ORAL DAILY
Qty: 90 TABLET | Refills: 1 | OUTPATIENT
Start: 2019-01-04

## 2019-01-10 ENCOUNTER — OFFICE VISIT (OUTPATIENT)
Dept: FAMILY MEDICINE CLINIC | Facility: CLINIC | Age: 74
End: 2019-01-10

## 2019-01-10 VITALS
HEIGHT: 70 IN | DIASTOLIC BLOOD PRESSURE: 85 MMHG | RESPIRATION RATE: 16 BRPM | TEMPERATURE: 97.5 F | SYSTOLIC BLOOD PRESSURE: 149 MMHG | HEART RATE: 88 BPM | WEIGHT: 207 LBS | BODY MASS INDEX: 29.63 KG/M2

## 2019-01-10 DIAGNOSIS — K21.9 GASTROESOPHAGEAL REFLUX DISEASE WITHOUT ESOPHAGITIS: ICD-10-CM

## 2019-01-10 DIAGNOSIS — I10 BENIGN ESSENTIAL HTN: Primary | ICD-10-CM

## 2019-01-10 DIAGNOSIS — J30.1 CHRONIC SEASONAL ALLERGIC RHINITIS DUE TO POLLEN: ICD-10-CM

## 2019-01-10 DIAGNOSIS — E78.2 MIXED HYPERLIPIDEMIA: ICD-10-CM

## 2019-01-10 DIAGNOSIS — H81.03 MENIERE DISEASE, BILATERAL: ICD-10-CM

## 2019-01-10 PROCEDURE — 99214 OFFICE O/P EST MOD 30 MIN: CPT | Performed by: FAMILY MEDICINE

## 2019-01-10 RX ORDER — FLUTICASONE PROPIONATE 50 MCG
2 SPRAY, SUSPENSION (ML) NASAL DAILY
Qty: 3 BOTTLE | Refills: 3 | Status: SHIPPED | OUTPATIENT
Start: 2019-01-10 | End: 2019-04-11 | Stop reason: SDUPTHER

## 2019-01-10 RX ORDER — OMEPRAZOLE 20 MG/1
20 CAPSULE, DELAYED RELEASE ORAL DAILY
Qty: 90 CAPSULE | Refills: 1 | Status: SHIPPED | OUTPATIENT
Start: 2019-01-10 | End: 2019-04-11 | Stop reason: SDUPTHER

## 2019-01-10 RX ORDER — SIMVASTATIN 40 MG
40 TABLET ORAL NIGHTLY
Qty: 90 TABLET | Refills: 1 | Status: SHIPPED | OUTPATIENT
Start: 2019-01-10 | End: 2019-04-11 | Stop reason: SDUPTHER

## 2019-01-10 RX ORDER — DIAZEPAM 5 MG/1
5 TABLET ORAL EVERY 12 HOURS PRN
Qty: 60 TABLET | Refills: 2 | Status: SHIPPED | OUTPATIENT
Start: 2019-01-10 | End: 2019-04-11 | Stop reason: SDUPTHER

## 2019-01-10 RX ORDER — IRBESARTAN 300 MG/1
300 TABLET ORAL DAILY
Qty: 90 TABLET | Refills: 1 | Status: SHIPPED | OUTPATIENT
Start: 2019-01-10 | End: 2019-04-11 | Stop reason: SDUPTHER

## 2019-01-10 RX ORDER — AMLODIPINE BESYLATE 2.5 MG/1
2.5 TABLET ORAL DAILY
Qty: 90 TABLET | Refills: 1 | Status: SHIPPED | OUTPATIENT
Start: 2019-01-10 | End: 2019-04-11 | Stop reason: SDUPTHER

## 2019-01-10 NOTE — PROGRESS NOTES
Subjective   Kali Crespo is a 73 y.o. male.     History of Present Illness     Chief Complaint:   Chief Complaint   Patient presents with   • Hypertension     med refill  - no labs  -multiple pharm    • Hyperlipidemia   • Anxiety     valium [facility] - [place]    • Heartburn       Kali Crespo 73 y.o. male who presents today for Medical Management of the below listed issues and medication refills.  he has a problem list of   Patient Active Problem List   Diagnosis   • Cervical pain   • Migraine without aura and without status migrainosus, not intractable   • Vertigo   • Benign essential HTN   • Acid reflux   • Encounter for pre-operative cardiovascular clearance   • Abnormal EKG   • Hyperlipidemia   • Erectile dysfunction   • Prostate cancer (CMS/HCC)   • Meniere disease, bilateral   .  Since the last visit, he has overall felt well.  he has been compliant with   Current Outpatient Medications:   •  amLODIPine (NORVASC) 2.5 MG tablet, Take 1 tablet by mouth Daily., Disp: 90 tablet, Rfl: 1  •  diazePAM (VALIUM) 5 MG tablet, Take 1 tablet by mouth Every 12 (Twelve) Hours As Needed (vertigo)., Disp: 60 tablet, Rfl: 2  •  fluticasone (FLONASE) 50 MCG/ACT nasal spray, 2 sprays into the nostril(s) as directed by provider Daily., Disp: 3 bottle, Rfl: 3  •  irbesartan (AVAPRO) 300 MG tablet, Take 1 tablet by mouth Daily., Disp: 90 tablet, Rfl: 1  •  omeprazole (priLOSEC) 20 MG capsule, Take 1 capsule by mouth Daily., Disp: 90 capsule, Rfl: 1  •  simvastatin (ZOCOR) 40 MG tablet, Take 1 tablet by mouth Every Night., Disp: 90 tablet, Rfl: 1  •  loratadine (CLARITIN) 10 MG tablet, Take 10 mg by mouth daily., Disp: , Rfl:   •  Multiple Vitamins-Minerals (CENTRAL-KEVON FOR SENIORS PO), Take by mouth., Disp: , Rfl:   •  nortriptyline (PAMELOR) 75 MG capsule, , Disp: , Rfl:   •  Omega-3 Fatty Acids (FISH OIL) 1000 MG capsule capsule, Take 1,000 mg by mouth daily., Disp: , Rfl:   •  sildenafil (REVATIO) 20 MG  "tablet, Take 2-3 tabs QD prn, Disp: 60 tablet, Rfl: 11  •  Thiamine HCl (VITAMIN B-1) 100 MG tablet, Take 500 mg by mouth daily., Disp: , Rfl: .  he denies medication side effects.    All of the chronic condition(s) listed above are stable w/o issues.    /85   Pulse 88   Temp 97.5 °F (36.4 °C) (Oral)   Resp 16   Ht 177.8 cm (70\")   Wt 93.9 kg (207 lb)   BMI 29.70 kg/m²     Results for orders placed or performed in visit on 09/11/18   Comprehensive metabolic panel   Result Value Ref Range    Glucose 92 65 - 99 mg/dL    BUN 14 8 - 23 mg/dL    Creatinine 0.96 0.76 - 1.27 mg/dL    eGFR Non African Am 77 >60 mL/min/1.73    eGFR African Am 93 >60 mL/min/1.73    BUN/Creatinine Ratio 14.6 7.0 - 25.0    Sodium 142 136 - 145 mmol/L    Potassium 4.6 3.5 - 5.2 mmol/L    Chloride 104 98 - 107 mmol/L    Total CO2 29.0 22.0 - 29.0 mmol/L    Calcium 9.2 8.6 - 10.5 mg/dL    Total Protein 6.3 6.0 - 8.5 g/dL    Albumin 4.30 3.50 - 5.20 g/dL    Globulin 2.0 gm/dL    A/G Ratio 2.2 g/dL    Total Bilirubin 0.4 0.1 - 1.2 mg/dL    Alkaline Phosphatase 107 39 - 117 U/L    AST (SGOT) 13 1 - 40 U/L    ALT (SGPT) 16 1 - 41 U/L   Lipid Panel With LDL/HDL Ratio   Result Value Ref Range    Total Cholesterol 178 0 - 200 mg/dL    Triglycerides 84 0 - 150 mg/dL    HDL Cholesterol 43 40 - 60 mg/dL    VLDL Cholesterol 16.8 5 - 40 mg/dL    LDL Cholesterol  118 (H) 0 - 100 mg/dL    LDL/HDL Ratio 2.75    TSH   Result Value Ref Range    TSH 3.670 0.270 - 4.200 mIU/mL   CBC and Differential   Result Value Ref Range    WBC 4.68 4.50 - 10.70 10*3/mm3    RBC 4.64 4.60 - 6.00 10*6/mm3    Hemoglobin 14.2 13.7 - 17.6 g/dL    Hematocrit 43.5 40.4 - 52.2 %    MCV 93.8 79.8 - 96.2 fL    MCH 30.6 27.0 - 32.7 pg    MCHC 32.6 32.6 - 36.4 g/dL    RDW 12.8 11.5 - 14.5 %    Platelets 220 140 - 500 10*3/mm3    Neutrophil Rel % 65.4 42.7 - 76.0 %    Lymphocyte Rel % 24.8 19.6 - 45.3 %    Monocyte Rel % 6.0 5.0 - 12.0 %    Eosinophil Rel % 3.2 0.3 - 6.2 %    " Basophil Rel % 0.6 0.0 - 1.5 %    Neutrophils Absolute 3.06 1.90 - 8.10 10*3/mm3    Lymphocytes Absolute 1.16 0.90 - 4.80 10*3/mm3    Monocytes Absolute 0.28 0.20 - 1.20 10*3/mm3    Eosinophils Absolute 0.15 0.00 - 0.70 10*3/mm3    Basophils Absolute 0.03 0.00 - 0.20 10*3/mm3    Immature Granulocyte Rel % 0.0 0.0 - 0.5 %    Immature Grans Absolute 0.00 0.00 - 0.03 10*3/mm3           The following portions of the patient's history were reviewed and updated as appropriate: allergies, current medications, past family history, past medical history, past social history, past surgical history and problem list.    Review of Systems   Constitutional: Negative for activity change, chills, fatigue and fever.   Respiratory: Negative for cough and shortness of breath.    Cardiovascular: Negative for chest pain and palpitations.   Gastrointestinal: Negative for abdominal pain.   Endocrine: Negative for cold intolerance.   Psychiatric/Behavioral: Negative for behavioral problems and dysphoric mood. The patient is not nervous/anxious.        Objective   Physical Exam   Constitutional: He appears well-developed and well-nourished.   Neck: Neck supple. No thyromegaly present.   Cardiovascular: Normal rate and regular rhythm.   No murmur heard.  Pulmonary/Chest: Effort normal and breath sounds normal.   Abdominal: Bowel sounds are normal. There is no tenderness.   Psychiatric: He has a normal mood and affect. His behavior is normal.   Nursing note and vitals reviewed.    The patient has read and signed the [facility] Controlled Substance Contract.  I will continue to see patient for regular follow up appointments.  They are well controlled on their medication.  KERVIN has been reviewed by me and is updated every 3 months. The patient is aware of the potential for addiction and dependence.    Assessment/Plan   Kali was seen today for hypertension, hyperlipidemia, anxiety and heartburn.    Diagnoses and all orders for this  visit:    Benign essential HTN  -     irbesartan (AVAPRO) 300 MG tablet; Take 1 tablet by mouth Daily.  -     amLODIPine (NORVASC) 2.5 MG tablet; Take 1 tablet by mouth Daily.    Meniere disease, bilateral  -     diazePAM (VALIUM) 5 MG tablet; Take 1 tablet by mouth Every 12 (Twelve) Hours As Needed (vertigo).    Chronic seasonal allergic rhinitis due to pollen  -     fluticasone (FLONASE) 50 MCG/ACT nasal spray; 2 sprays into the nostril(s) as directed by provider Daily.    Gastroesophageal reflux disease without esophagitis  -     omeprazole (priLOSEC) 20 MG capsule; Take 1 capsule by mouth Daily.    Mixed hyperlipidemia  -     simvastatin (ZOCOR) 40 MG tablet; Take 1 tablet by mouth Every Night.

## 2019-03-08 ENCOUNTER — TELEPHONE (OUTPATIENT)
Dept: FAMILY MEDICINE CLINIC | Facility: CLINIC | Age: 74
End: 2019-03-08

## 2019-03-08 DIAGNOSIS — I10 BENIGN ESSENTIAL HTN: Primary | ICD-10-CM

## 2019-03-08 DIAGNOSIS — E78.2 MIXED HYPERLIPIDEMIA: ICD-10-CM

## 2019-03-08 DIAGNOSIS — C61 PROSTATE CANCER (HCC): ICD-10-CM

## 2019-03-08 DIAGNOSIS — I10 BENIGN ESSENTIAL HTN: ICD-10-CM

## 2019-03-11 LAB
ALBUMIN SERPL-MCNC: 4.1 G/DL (ref 3.5–5.2)
ALBUMIN/GLOB SERPL: 2.2 G/DL
ALP SERPL-CCNC: 89 U/L (ref 39–117)
ALT SERPL-CCNC: 16 U/L (ref 1–41)
AST SERPL-CCNC: 13 U/L (ref 1–40)
BASOPHILS # BLD AUTO: 0.03 10*3/MM3 (ref 0–0.2)
BASOPHILS NFR BLD AUTO: 0.6 % (ref 0–1.5)
BILIRUB SERPL-MCNC: 0.5 MG/DL (ref 0.1–1.2)
BUN SERPL-MCNC: 13 MG/DL (ref 8–23)
BUN/CREAT SERPL: 12.6 (ref 7–25)
CALCIUM SERPL-MCNC: 9.4 MG/DL (ref 8.6–10.5)
CHLORIDE SERPL-SCNC: 101 MMOL/L (ref 98–107)
CHOLEST SERPL-MCNC: 172 MG/DL (ref 0–200)
CO2 SERPL-SCNC: 27.3 MMOL/L (ref 22–29)
CREAT SERPL-MCNC: 1.03 MG/DL (ref 0.76–1.27)
EOSINOPHIL # BLD AUTO: 0.19 10*3/MM3 (ref 0–0.4)
EOSINOPHIL NFR BLD AUTO: 3.8 % (ref 0.3–6.2)
ERYTHROCYTE [DISTWIDTH] IN BLOOD BY AUTOMATED COUNT: 12.7 % (ref 12.3–15.4)
GLOBULIN SER CALC-MCNC: 1.9 GM/DL
GLUCOSE SERPL-MCNC: 88 MG/DL (ref 65–99)
HCT VFR BLD AUTO: 45.9 % (ref 37.5–51)
HDLC SERPL-MCNC: 41 MG/DL (ref 40–60)
HGB BLD-MCNC: 15.2 G/DL (ref 13–17.7)
IMM GRANULOCYTES # BLD AUTO: 0.02 10*3/MM3 (ref 0–0.05)
IMM GRANULOCYTES NFR BLD AUTO: 0.4 % (ref 0–0.5)
LDLC SERPL CALC-MCNC: 106 MG/DL (ref 0–100)
LDLC/HDLC SERPL: 2.59 {RATIO}
LYMPHOCYTES # BLD AUTO: 1.33 10*3/MM3 (ref 0.7–3.1)
LYMPHOCYTES NFR BLD AUTO: 26.5 % (ref 19.6–45.3)
MCH RBC QN AUTO: 31.1 PG (ref 26.6–33)
MCHC RBC AUTO-ENTMCNC: 33.1 G/DL (ref 31.5–35.7)
MCV RBC AUTO: 93.9 FL (ref 79–97)
MONOCYTES # BLD AUTO: 0.29 10*3/MM3 (ref 0.1–0.9)
MONOCYTES NFR BLD AUTO: 5.8 % (ref 5–12)
NEUTROPHILS # BLD AUTO: 3.16 10*3/MM3 (ref 1.4–7)
NEUTROPHILS NFR BLD AUTO: 62.9 % (ref 42.7–76)
NRBC BLD AUTO-RTO: 0 /100 WBC (ref 0–0)
PLATELET # BLD AUTO: 228 10*3/MM3 (ref 140–450)
POTASSIUM SERPL-SCNC: 4.4 MMOL/L (ref 3.5–5.2)
PROT SERPL-MCNC: 6 G/DL (ref 6–8.5)
PSA SERPL-MCNC: <0.014 NG/ML (ref 0–4)
RBC # BLD AUTO: 4.89 10*6/MM3 (ref 4.14–5.8)
SODIUM SERPL-SCNC: 141 MMOL/L (ref 136–145)
TRIGL SERPL-MCNC: 125 MG/DL (ref 0–150)
TSH SERPL DL<=0.005 MIU/L-ACNC: 6.7 MIU/ML (ref 0.27–4.2)
VLDLC SERPL CALC-MCNC: 25 MG/DL (ref 5–40)
WBC # BLD AUTO: 5.02 10*3/MM3 (ref 3.4–10.8)

## 2019-03-13 DIAGNOSIS — E03.9 HYPOTHYROIDISM, UNSPECIFIED TYPE: Primary | ICD-10-CM

## 2019-03-13 RX ORDER — LEVOTHYROXINE SODIUM 0.03 MG/1
25 TABLET ORAL DAILY
Qty: 30 TABLET | Refills: 5 | Status: SHIPPED | OUTPATIENT
Start: 2019-03-13 | End: 2019-04-11 | Stop reason: SDUPTHER

## 2019-04-09 DIAGNOSIS — H81.03 MENIERE DISEASE, BILATERAL: ICD-10-CM

## 2019-04-09 RX ORDER — DIAZEPAM 5 MG/1
TABLET ORAL
Qty: 60 TABLET | Refills: 0 | OUTPATIENT
Start: 2019-04-09

## 2019-04-11 ENCOUNTER — OFFICE VISIT (OUTPATIENT)
Dept: FAMILY MEDICINE CLINIC | Facility: CLINIC | Age: 74
End: 2019-04-11

## 2019-04-11 VITALS
RESPIRATION RATE: 18 BRPM | SYSTOLIC BLOOD PRESSURE: 129 MMHG | HEART RATE: 90 BPM | WEIGHT: 201 LBS | TEMPERATURE: 97.8 F | BODY MASS INDEX: 28.77 KG/M2 | DIASTOLIC BLOOD PRESSURE: 82 MMHG | HEIGHT: 70 IN

## 2019-04-11 DIAGNOSIS — E03.9 HYPOTHYROIDISM, UNSPECIFIED TYPE: ICD-10-CM

## 2019-04-11 DIAGNOSIS — J30.1 CHRONIC SEASONAL ALLERGIC RHINITIS DUE TO POLLEN: ICD-10-CM

## 2019-04-11 DIAGNOSIS — H81.03 MENIERE DISEASE, BILATERAL: Primary | ICD-10-CM

## 2019-04-11 DIAGNOSIS — I10 BENIGN ESSENTIAL HTN: ICD-10-CM

## 2019-04-11 DIAGNOSIS — K21.9 GASTROESOPHAGEAL REFLUX DISEASE WITHOUT ESOPHAGITIS: ICD-10-CM

## 2019-04-11 DIAGNOSIS — E78.2 MIXED HYPERLIPIDEMIA: ICD-10-CM

## 2019-04-11 DIAGNOSIS — N52.9 ERECTILE DYSFUNCTION, UNSPECIFIED ERECTILE DYSFUNCTION TYPE: ICD-10-CM

## 2019-04-11 PROCEDURE — 99214 OFFICE O/P EST MOD 30 MIN: CPT | Performed by: FAMILY MEDICINE

## 2019-04-11 RX ORDER — OMEPRAZOLE 20 MG/1
20 CAPSULE, DELAYED RELEASE ORAL DAILY
Qty: 90 CAPSULE | Refills: 1 | Status: SHIPPED | OUTPATIENT
Start: 2019-04-11 | End: 2019-10-10 | Stop reason: SDUPTHER

## 2019-04-11 RX ORDER — DIAZEPAM 5 MG/1
5 TABLET ORAL EVERY 12 HOURS PRN
Qty: 60 TABLET | Refills: 2 | Status: SHIPPED | OUTPATIENT
Start: 2019-04-11 | End: 2019-07-11 | Stop reason: SDUPTHER

## 2019-04-11 RX ORDER — AMLODIPINE BESYLATE 2.5 MG/1
2.5 TABLET ORAL DAILY
Qty: 90 TABLET | Refills: 1 | Status: SHIPPED | OUTPATIENT
Start: 2019-04-11 | End: 2019-07-11

## 2019-04-11 RX ORDER — IRBESARTAN 300 MG/1
300 TABLET ORAL DAILY
Qty: 90 TABLET | Refills: 1 | Status: SHIPPED | OUTPATIENT
Start: 2019-04-11 | End: 2019-10-10 | Stop reason: SDUPTHER

## 2019-04-11 RX ORDER — FLUTICASONE PROPIONATE 50 MCG
2 SPRAY, SUSPENSION (ML) NASAL DAILY
Qty: 3 BOTTLE | Refills: 3 | Status: SHIPPED | OUTPATIENT
Start: 2019-04-11 | End: 2019-10-10 | Stop reason: SDUPTHER

## 2019-04-11 RX ORDER — LEVOTHYROXINE SODIUM 0.03 MG/1
25 TABLET ORAL DAILY
Qty: 30 TABLET | Refills: 5 | Status: SHIPPED | OUTPATIENT
Start: 2019-04-11 | End: 2019-10-10 | Stop reason: SDUPTHER

## 2019-04-11 RX ORDER — SILDENAFIL CITRATE 20 MG/1
TABLET ORAL
Qty: 60 TABLET | Refills: 11 | Status: SHIPPED | OUTPATIENT
Start: 2019-04-11 | End: 2020-04-13 | Stop reason: SDUPTHER

## 2019-04-11 RX ORDER — SIMVASTATIN 40 MG
40 TABLET ORAL NIGHTLY
Qty: 90 TABLET | Refills: 1 | Status: SHIPPED | OUTPATIENT
Start: 2019-04-11 | End: 2019-04-18 | Stop reason: SDUPTHER

## 2019-04-11 RX ORDER — EPINEPHRINE 0.3 MG/.3ML
0.3 INJECTION SUBCUTANEOUS ONCE
Qty: 1 EACH | Refills: 5 | Status: SHIPPED | OUTPATIENT
Start: 2019-04-11 | End: 2019-04-11

## 2019-04-11 NOTE — PROGRESS NOTES
Subjective   Kali Crespo is a 73 y.o. male.     History of Present Illness     Chief Complaint:   Chief Complaint   Patient presents with   • meniere disease     med refill - [facility]  - requesting rx for epi -pen    • Hypertension     lab results      • Hyperlipidemia   • Hypothyroidism   • Erectile Dysfunction   • Heartburn       Kali Crespo 73 y.o. male who presents today for Medical Management of the below listed issues and medication refills.  he has a problem list of   Patient Active Problem List   Diagnosis   • Cervical pain   • Migraine without aura and without status migrainosus, not intractable   • Vertigo   • Benign essential HTN   • Acid reflux   • Encounter for pre-operative cardiovascular clearance   • Abnormal EKG   • Hyperlipidemia   • Erectile dysfunction   • Prostate cancer (CMS/HCC)   • Meniere disease, bilateral   .  Since the last visit, he has overall felt well.  he has been compliant with   Current Outpatient Medications:   •  amLODIPine (NORVASC) 2.5 MG tablet, Take 1 tablet by mouth Daily., Disp: 90 tablet, Rfl: 1  •  fluticasone (FLONASE) 50 MCG/ACT nasal spray, 2 sprays into the nostril(s) as directed by provider Daily., Disp: 3 bottle, Rfl: 3  •  irbesartan (AVAPRO) 300 MG tablet, Take 1 tablet by mouth Daily., Disp: 90 tablet, Rfl: 1  •  levothyroxine (SYNTHROID) 25 MCG tablet, Take 1 tablet by mouth Daily., Disp: 30 tablet, Rfl: 5  •  omeprazole (priLOSEC) 20 MG capsule, Take 1 capsule by mouth Daily., Disp: 90 capsule, Rfl: 1  •  sildenafil (REVATIO) 20 MG tablet, Take 2-3 tabs QD prn, Disp: 60 tablet, Rfl: 11  •  simvastatin (ZOCOR) 40 MG tablet, Take 1 tablet by mouth Every Night., Disp: 90 tablet, Rfl: 1  •  diazePAM (VALIUM) 5 MG tablet, Take 1 tablet by mouth Every 12 (Twelve) Hours As Needed (vertigo)., Disp: 60 tablet, Rfl: 2  •  EPINEPHrine (EPIPEN 2-CYN) 0.3 MG/0.3ML solution auto-injector injection, Inject 0.3 mL into the appropriate muscle as directed by  "prescriber 1 (One) Time for 1 dose., Disp: 1 each, Rfl: 5  •  loratadine (CLARITIN) 10 MG tablet, Take 10 mg by mouth daily., Disp: , Rfl:   •  Multiple Vitamins-Minerals (CENTRAL-KEVON FOR SENIORS PO), Take by mouth., Disp: , Rfl:   •  nortriptyline (PAMELOR) 75 MG capsule, , Disp: , Rfl:   •  Omega-3 Fatty Acids (FISH OIL) 1000 MG capsule capsule, Take 1,000 mg by mouth daily., Disp: , Rfl:   •  Thiamine HCl (VITAMIN B-1) 100 MG tablet, Take 500 mg by mouth daily., Disp: , Rfl: .  he denies medication side effects.    All of the chronic condition(s) listed above are stable w/o issues.    /82   Pulse 90   Temp 97.8 °F (36.6 °C) (Oral)   Resp 18   Ht 177.8 cm (70\")   Wt 91.2 kg (201 lb)   BMI 28.84 kg/m²     Results for orders placed or performed in visit on 03/08/19   PSA DIAGNOSTIC   Result Value Ref Range    PSA <0.014 0.000 - 4.000 ng/mL   TSH   Result Value Ref Range    TSH 6.700 (H) 0.270 - 4.200 mIU/mL   Comprehensive metabolic panel   Result Value Ref Range    Glucose 88 65 - 99 mg/dL    BUN 13 8 - 23 mg/dL    Creatinine 1.03 0.76 - 1.27 mg/dL    eGFR Non African Am 71 >60 mL/min/1.73    eGFR African Am 86 >60 mL/min/1.73    BUN/Creatinine Ratio 12.6 7.0 - 25.0    Sodium 141 136 - 145 mmol/L    Potassium 4.4 3.5 - 5.2 mmol/L    Chloride 101 98 - 107 mmol/L    Total CO2 27.3 22.0 - 29.0 mmol/L    Calcium 9.4 8.6 - 10.5 mg/dL    Total Protein 6.0 6.0 - 8.5 g/dL    Albumin 4.10 3.50 - 5.20 g/dL    Globulin 1.9 gm/dL    A/G Ratio 2.2 g/dL    Total Bilirubin 0.5 0.1 - 1.2 mg/dL    Alkaline Phosphatase 89 39 - 117 U/L    AST (SGOT) 13 1 - 40 U/L    ALT (SGPT) 16 1 - 41 U/L   Lipid Panel With LDL / HDL Ratio   Result Value Ref Range    Total Cholesterol 172 0 - 200 mg/dL    Triglycerides 125 0 - 150 mg/dL    HDL Cholesterol 41 40 - 60 mg/dL    VLDL Cholesterol 25 5 - 40 mg/dL    LDL Cholesterol  106 (H) 0 - 100 mg/dL    LDL/HDL Ratio 2.59    CBC and Differential   Result Value Ref Range    WBC 5.02 3.40 " - 10.80 10*3/mm3    RBC 4.89 4.14 - 5.80 10*6/mm3    Hemoglobin 15.2 13.0 - 17.7 g/dL    Hematocrit 45.9 37.5 - 51.0 %    MCV 93.9 79.0 - 97.0 fL    MCH 31.1 26.6 - 33.0 pg    MCHC 33.1 31.5 - 35.7 g/dL    RDW 12.7 12.3 - 15.4 %    Platelets 228 140 - 450 10*3/mm3    Neutrophil Rel % 62.9 42.7 - 76.0 %    Lymphocyte Rel % 26.5 19.6 - 45.3 %    Monocyte Rel % 5.8 5.0 - 12.0 %    Eosinophil Rel % 3.8 0.3 - 6.2 %    Basophil Rel % 0.6 0.0 - 1.5 %    Neutrophils Absolute 3.16 1.40 - 7.00 10*3/mm3    Lymphocytes Absolute 1.33 0.70 - 3.10 10*3/mm3    Monocytes Absolute 0.29 0.10 - 0.90 10*3/mm3    Eosinophils Absolute 0.19 0.00 - 0.40 10*3/mm3    Basophils Absolute 0.03 0.00 - 0.20 10*3/mm3    Immature Granulocyte Rel % 0.4 0.0 - 0.5 %    Immature Grans Absolute 0.02 0.00 - 0.05 10*3/mm3    nRBC 0.0 0.0 - 0.0 /100 WBC           The following portions of the patient's history were reviewed and updated as appropriate: allergies, current medications, past family history, past medical history, past social history, past surgical history and problem list.    Review of Systems   Constitutional: Negative for activity change, chills, fatigue and fever.   Respiratory: Negative for cough and shortness of breath.    Cardiovascular: Negative for chest pain and palpitations.   Gastrointestinal: Negative for abdominal pain.   Endocrine: Negative for cold intolerance.   Psychiatric/Behavioral: Negative for behavioral problems and dysphoric mood. The patient is not nervous/anxious.        Objective   Physical Exam   Constitutional: He appears well-developed and well-nourished.   Neck: Neck supple. No thyromegaly present.   Cardiovascular: Normal rate and regular rhythm.   No murmur heard.  Pulmonary/Chest: Effort normal and breath sounds normal.   Abdominal: Bowel sounds are normal. There is no tenderness.   Psychiatric: He has a normal mood and affect. His behavior is normal.   Nursing note and vitals reviewed.  Labs reviewed with pt  today during visit. All questions answered.      Assessment/Plan   Kali was seen today for meniere disease, hypertension, hyperlipidemia, hypothyroidism, erectile dysfunction and heartburn.    Diagnoses and all orders for this visit:    Meniere disease, bilateral  -     diazePAM (VALIUM) 5 MG tablet; Take 1 tablet by mouth Every 12 (Twelve) Hours As Needed (vertigo).    Benign essential HTN  -     amLODIPine (NORVASC) 2.5 MG tablet; Take 1 tablet by mouth Daily.  -     irbesartan (AVAPRO) 300 MG tablet; Take 1 tablet by mouth Daily.    Mixed hyperlipidemia  -     simvastatin (ZOCOR) 40 MG tablet; Take 1 tablet by mouth Every Night.    Gastroesophageal reflux disease without esophagitis  -     omeprazole (priLOSEC) 20 MG capsule; Take 1 capsule by mouth Daily.    Chronic seasonal allergic rhinitis due to pollen  -     fluticasone (FLONASE) 50 MCG/ACT nasal spray; 2 sprays into the nostril(s) as directed by provider Daily.    Erectile dysfunction, unspecified erectile dysfunction type  -     sildenafil (REVATIO) 20 MG tablet; Take 2-3 tabs QD prn    Hypothyroidism, unspecified type  -     levothyroxine (SYNTHROID) 25 MCG tablet; Take 1 tablet by mouth Daily.    Other orders  -     EPINEPHrine (EPIPEN 2-CYN) 0.3 MG/0.3ML solution auto-injector injection; Inject 0.3 mL into the appropriate muscle as directed by prescriber 1 (One) Time for 1 dose.    Continue with current BP meds, diet/exercise.

## 2019-04-18 ENCOUNTER — TELEPHONE (OUTPATIENT)
Dept: FAMILY MEDICINE CLINIC | Facility: CLINIC | Age: 74
End: 2019-04-18

## 2019-04-18 DIAGNOSIS — E78.2 MIXED HYPERLIPIDEMIA: ICD-10-CM

## 2019-04-18 RX ORDER — SIMVASTATIN 20 MG
20 TABLET ORAL NIGHTLY
Qty: 90 TABLET | Refills: 1 | Status: SHIPPED | OUTPATIENT
Start: 2019-04-18 | End: 2019-10-10 | Stop reason: SDUPTHER

## 2019-04-24 LAB
T4 FREE SERPL-MCNC: 1.19 NG/DL (ref 0.93–1.7)
TSH SERPL DL<=0.005 MIU/L-ACNC: 3.27 MIU/ML (ref 0.27–4.2)

## 2019-04-27 ENCOUNTER — RESULTS ENCOUNTER (OUTPATIENT)
Dept: FAMILY MEDICINE CLINIC | Facility: CLINIC | Age: 74
End: 2019-04-27

## 2019-04-27 DIAGNOSIS — E03.9 HYPOTHYROIDISM, UNSPECIFIED TYPE: ICD-10-CM

## 2019-04-30 ENCOUNTER — OFFICE VISIT (OUTPATIENT)
Dept: FAMILY MEDICINE CLINIC | Facility: CLINIC | Age: 74
End: 2019-04-30

## 2019-04-30 VITALS
RESPIRATION RATE: 16 BRPM | SYSTOLIC BLOOD PRESSURE: 138 MMHG | HEIGHT: 70 IN | WEIGHT: 196 LBS | HEART RATE: 86 BPM | TEMPERATURE: 98.1 F | DIASTOLIC BLOOD PRESSURE: 79 MMHG | BODY MASS INDEX: 28.06 KG/M2

## 2019-04-30 DIAGNOSIS — M51.36 LUMBAR DEGENERATIVE DISC DISEASE: Primary | ICD-10-CM

## 2019-04-30 DIAGNOSIS — J01.00 ACUTE NON-RECURRENT MAXILLARY SINUSITIS: ICD-10-CM

## 2019-04-30 PROBLEM — M51.369 LUMBAR DEGENERATIVE DISC DISEASE: Status: ACTIVE | Noted: 2019-04-30

## 2019-04-30 PROCEDURE — 99213 OFFICE O/P EST LOW 20 MIN: CPT | Performed by: FAMILY MEDICINE

## 2019-04-30 RX ORDER — EPINEPHRINE 0.3 MG/.3ML
INJECTION SUBCUTANEOUS
Refills: 5 | COMMUNITY
Start: 2019-04-11 | End: 2020-04-13 | Stop reason: SDUPTHER

## 2019-04-30 RX ORDER — CEFUROXIME AXETIL 500 MG/1
500 TABLET ORAL 2 TIMES DAILY
Qty: 20 TABLET | Refills: 0 | Status: SHIPPED | OUTPATIENT
Start: 2019-04-30 | End: 2019-05-10

## 2019-04-30 NOTE — PROGRESS NOTES
"Subjective   Kali Crespo is a 73 y.o. male.     CC: Chronic Lumbar Back Pain         URI    History of Present Illness     Pt comes in today to discuss some ongoing lumbar back pain issues. Had seen pain management prior and did well with prior injections. No recent injury. Has known DDD (neck and lumbar). The pain doesn't radiate but keeps him from being as active as he would like.    Kali Crespo 73 y.o. male who presents for evaluation of sinus pressure and congestion, cough. Symptoms include congestion.  Onset of symptoms was 6 days ago, gradually worsening since that time. Patient denies wheezing, hemoptysis.   Evaluation to date: none Treatment to date:  OTC antihistamines.    The following portions of the patient's history were reviewed and updated as appropriate: allergies, current medications, past family history, past medical history, past social history, past surgical history and problem list.    Review of Systems   Constitutional: Negative for activity change, chills, fatigue and fever.   HENT: Positive for congestion, postnasal drip and sinus pressure.    Respiratory: Negative for cough, chest tightness and shortness of breath.    Cardiovascular: Negative for chest pain and palpitations.   Gastrointestinal: Negative for abdominal pain and nausea.   Endocrine: Negative for cold intolerance and polydipsia.   Musculoskeletal: Positive for back pain.   Psychiatric/Behavioral: Negative for behavioral problems and dysphoric mood. The patient is not nervous/anxious.    All other systems reviewed and are negative.      /79   Pulse 86   Temp 98.1 °F (36.7 °C) (Oral)   Resp 16   Ht 177.8 cm (70\")   Wt 88.9 kg (196 lb)   BMI 28.12 kg/m²     Objective   Physical Exam   Constitutional: He appears well-developed and well-nourished.   HENT:   Right Ear: Tympanic membrane normal.   Left Ear: Tympanic membrane normal.   Nose: Right sinus exhibits maxillary sinus tenderness. Left sinus exhibits " maxillary sinus tenderness.   Mouth/Throat: Oropharynx is clear and moist. No oropharyngeal exudate, posterior oropharyngeal erythema or tonsillar abscesses.   Neck: Neck supple. No thyromegaly present.   Cardiovascular: Normal rate and regular rhythm.   No murmur heard.  Pulmonary/Chest: Effort normal and breath sounds normal.   Abdominal: Bowel sounds are normal. There is no tenderness.   Psychiatric: He has a normal mood and affect. His behavior is normal.   Nursing note and vitals reviewed.      Assessment/Plan   Kali was seen today for referral to pain management and sinusitis.    Diagnoses and all orders for this visit:    Lumbar degenerative disc disease  Comments:  worsening  Orders:  -     MRI Lumbar Spine Without Contrast; Future  -     Ambulatory Referral to Pain Management    Acute non-recurrent maxillary sinusitis  -     cefuroxime (CEFTIN) 500 MG tablet; Take 1 tablet by mouth 2 (Two) Times a Day for 10 days.

## 2019-05-13 ENCOUNTER — HOSPITAL ENCOUNTER (OUTPATIENT)
Dept: MRI IMAGING | Facility: HOSPITAL | Age: 74
Discharge: HOME | End: 2019-05-13
Admitting: FAMILY MEDICINE

## 2019-05-13 DIAGNOSIS — M51.36 LUMBAR DEGENERATIVE DISC DISEASE: ICD-10-CM

## 2019-05-13 PROCEDURE — 72148 MRI LUMBAR SPINE W/O DYE: CPT

## 2019-07-11 ENCOUNTER — OFFICE VISIT (OUTPATIENT)
Dept: FAMILY MEDICINE CLINIC | Facility: CLINIC | Age: 74
End: 2019-07-11

## 2019-07-11 VITALS
BODY MASS INDEX: 27.06 KG/M2 | SYSTOLIC BLOOD PRESSURE: 104 MMHG | TEMPERATURE: 98.7 F | DIASTOLIC BLOOD PRESSURE: 71 MMHG | RESPIRATION RATE: 16 BRPM | HEIGHT: 70 IN | HEART RATE: 85 BPM | WEIGHT: 189 LBS

## 2019-07-11 DIAGNOSIS — H81.03 MENIERE DISEASE, BILATERAL: Primary | ICD-10-CM

## 2019-07-11 DIAGNOSIS — I10 BENIGN ESSENTIAL HTN: ICD-10-CM

## 2019-07-11 PROCEDURE — 99213 OFFICE O/P EST LOW 20 MIN: CPT | Performed by: FAMILY MEDICINE

## 2019-07-11 RX ORDER — DIAZEPAM 5 MG/1
5 TABLET ORAL EVERY 12 HOURS PRN
Qty: 60 TABLET | Refills: 2 | Status: SHIPPED | OUTPATIENT
Start: 2019-07-11 | End: 2019-10-10 | Stop reason: SDUPTHER

## 2019-07-11 NOTE — PROGRESS NOTES
Subjective   Kali Crespo is a 73 y.o. male.     History of Present Illness     Chief Complaint:   Chief Complaint   Patient presents with   • MENIERE DISEASE     med refill - she[facility]    • Hypertension       Kali Crespo 73 y.o. male who presents today for Medical Management of the below listed issues and medication refills.  he has a problem list of   Patient Active Problem List   Diagnosis   • Cervical pain   • Migraine without aura and without status migrainosus, not intractable   • Vertigo   • Benign essential HTN   • Acid reflux   • Encounter for pre-operative cardiovascular clearance   • Abnormal EKG   • Hyperlipidemia   • Erectile dysfunction   • Prostate cancer (CMS/HCC)   • Meniere disease, bilateral   • Lumbar degenerative disc disease   .  Since the last visit, he has overall felt well. HAS LOST A GOOD DEAL OF WEIGHT WITH DIET AND EXERCISE AND NOW HIS BP IS RUNNING TOO LOW.  he has been compliant with   Current Outpatient Medications:   •  diazePAM (VALIUM) 5 MG tablet, Take 1 tablet by mouth Every 12 (Twelve) Hours As Needed (vertigo)., Disp: 60 tablet, Rfl: 2  •  EPINEPHrine (EPIPEN) 0.3 MG/0.3ML solution auto-injector injection, INJECT 0.3 ML INTO THE APPROPRIATE MUSCLE UTD FOR 1 DOSE, Disp: , Rfl: 5  •  fluticasone (FLONASE) 50 MCG/ACT nasal spray, 2 sprays into the nostril(s) as directed by provider Daily., Disp: 3 bottle, Rfl: 3  •  irbesartan (AVAPRO) 300 MG tablet, Take 1 tablet by mouth Daily., Disp: 90 tablet, Rfl: 1  •  levothyroxine (SYNTHROID) 25 MCG tablet, Take 1 tablet by mouth Daily., Disp: 30 tablet, Rfl: 5  •  loratadine (CLARITIN) 10 MG tablet, Take 10 mg by mouth daily., Disp: , Rfl:   •  Multiple Vitamins-Minerals (CENTRAL-KEVON FOR SENIORS PO), Take by mouth., Disp: , Rfl:   •  nortriptyline (PAMELOR) 75 MG capsule, , Disp: , Rfl:   •  Omega-3 Fatty Acids (FISH OIL) 1000 MG capsule capsule, Take 1,000 mg by mouth daily., Disp: , Rfl:   •  omeprazole (priLOSEC) 20 MG  "capsule, Take 1 capsule by mouth Daily., Disp: 90 capsule, Rfl: 1  •  sildenafil (REVATIO) 20 MG tablet, Take 2-3 tabs QD prn, Disp: 60 tablet, Rfl: 11  •  simvastatin (ZOCOR) 20 MG tablet, Take 1 tablet by mouth Every Night., Disp: 90 tablet, Rfl: 1  •  Thiamine HCl (VITAMIN B-1) 100 MG tablet, Take 500 mg by mouth daily., Disp: , Rfl: .  he denies medication side effects.    All of the chronic condition(s) listed above are stable w/o issues.    /71   Pulse 85   Temp 98.7 °F (37.1 °C) (Oral)   Resp 16   Ht 177.8 cm (70\")   Wt 85.7 kg (189 lb)   BMI 27.12 kg/m²     Results for orders placed or performed in visit on 04/27/19   TSH   Result Value Ref Range    TSH 3.270 0.270 - 4.200 mIU/mL   T4, free   Result Value Ref Range    Free T4 1.19 0.93 - 1.70 ng/dL           The following portions of the patient's history were reviewed and updated as appropriate: allergies, current medications, past family history, past medical history, past social history, past surgical history and problem list.    Review of Systems   Constitutional: Negative for activity change, chills, fatigue and fever.   Respiratory: Negative for cough and shortness of breath.    Cardiovascular: Negative for chest pain and palpitations.   Gastrointestinal: Negative for abdominal pain.   Endocrine: Negative for cold intolerance.   Psychiatric/Behavioral: Negative for behavioral problems and dysphoric mood. The patient is not nervous/anxious.        Objective   Physical Exam   Constitutional: He appears well-developed and well-nourished.   Neck: Neck supple. No thyromegaly present.   Cardiovascular: Normal rate and regular rhythm.   No murmur heard.  Pulmonary/Chest: Effort normal and breath sounds normal.   Abdominal: Bowel sounds are normal. There is no tenderness.   Psychiatric: He has a normal mood and affect. His behavior is normal.   Nursing note and vitals reviewed.      Assessment/Plan   Kali was seen today for meniere disease and " hypertension.    Diagnoses and all orders for this visit:    Meniere disease, bilateral  -     diazePAM (VALIUM) 5 MG tablet; Take 1 tablet by mouth Every 12 (Twelve) Hours As Needed (vertigo).    Benign essential HTN    Pt doing much better and is going to d/c the Norvasc at this time.

## 2019-09-03 ENCOUNTER — TELEPHONE (OUTPATIENT)
Dept: FAMILY MEDICINE CLINIC | Facility: CLINIC | Age: 74
End: 2019-09-03

## 2019-09-03 DIAGNOSIS — I10 BENIGN ESSENTIAL HTN: Primary | ICD-10-CM

## 2019-09-03 DIAGNOSIS — E78.2 MIXED HYPERLIPIDEMIA: ICD-10-CM

## 2019-09-06 LAB
ALBUMIN SERPL-MCNC: 4 G/DL (ref 3.5–5.2)
ALBUMIN/GLOB SERPL: 2.2 G/DL
ALP SERPL-CCNC: 99 U/L (ref 39–117)
ALT SERPL-CCNC: 14 U/L (ref 1–41)
AST SERPL-CCNC: 15 U/L (ref 1–40)
BASOPHILS # BLD AUTO: 0.06 10*3/MM3 (ref 0–0.2)
BASOPHILS NFR BLD AUTO: 1.1 % (ref 0–1.5)
BILIRUB SERPL-MCNC: 0.4 MG/DL (ref 0.2–1.2)
BUN SERPL-MCNC: 12 MG/DL (ref 8–23)
BUN/CREAT SERPL: 12.8 (ref 7–25)
CALCIUM SERPL-MCNC: 8.9 MG/DL (ref 8.6–10.5)
CHLORIDE SERPL-SCNC: 104 MMOL/L (ref 98–107)
CHOLEST SERPL-MCNC: 175 MG/DL (ref 0–200)
CO2 SERPL-SCNC: 28.2 MMOL/L (ref 22–29)
CREAT SERPL-MCNC: 0.94 MG/DL (ref 0.76–1.27)
EOSINOPHIL # BLD AUTO: 0.43 10*3/MM3 (ref 0–0.4)
EOSINOPHIL NFR BLD AUTO: 7.7 % (ref 0.3–6.2)
ERYTHROCYTE [DISTWIDTH] IN BLOOD BY AUTOMATED COUNT: 13.6 % (ref 12.3–15.4)
GLOBULIN SER CALC-MCNC: 1.8 GM/DL
GLUCOSE SERPL-MCNC: 86 MG/DL (ref 65–99)
HCT VFR BLD AUTO: 43.5 % (ref 37.5–51)
HDLC SERPL-MCNC: 42 MG/DL (ref 40–60)
HGB BLD-MCNC: 14.2 G/DL (ref 13–17.7)
IMM GRANULOCYTES # BLD AUTO: 0.04 10*3/MM3 (ref 0–0.05)
IMM GRANULOCYTES NFR BLD AUTO: 0.7 % (ref 0–0.5)
LDLC SERPL CALC-MCNC: 117 MG/DL (ref 0–100)
LDLC/HDLC SERPL: 2.79 {RATIO}
LYMPHOCYTES # BLD AUTO: 1.09 10*3/MM3 (ref 0.7–3.1)
LYMPHOCYTES NFR BLD AUTO: 19.5 % (ref 19.6–45.3)
MCH RBC QN AUTO: 31.8 PG (ref 26.6–33)
MCHC RBC AUTO-ENTMCNC: 32.6 G/DL (ref 31.5–35.7)
MCV RBC AUTO: 97.5 FL (ref 79–97)
MONOCYTES # BLD AUTO: 0.38 10*3/MM3 (ref 0.1–0.9)
MONOCYTES NFR BLD AUTO: 6.8 % (ref 5–12)
NEUTROPHILS # BLD AUTO: 3.6 10*3/MM3 (ref 1.7–7)
NEUTROPHILS NFR BLD AUTO: 64.2 % (ref 42.7–76)
NRBC BLD AUTO-RTO: 0 /100 WBC (ref 0–0.2)
PLATELET # BLD AUTO: 240 10*3/MM3 (ref 140–450)
POTASSIUM SERPL-SCNC: 4.6 MMOL/L (ref 3.5–5.2)
PROT SERPL-MCNC: 5.8 G/DL (ref 6–8.5)
RBC # BLD AUTO: 4.46 10*6/MM3 (ref 4.14–5.8)
SODIUM SERPL-SCNC: 142 MMOL/L (ref 136–145)
TRIGL SERPL-MCNC: 79 MG/DL (ref 0–150)
TSH SERPL DL<=0.005 MIU/L-ACNC: 3.68 UIU/ML (ref 0.27–4.2)
VLDLC SERPL CALC-MCNC: 15.8 MG/DL
WBC # BLD AUTO: 5.6 10*3/MM3 (ref 3.4–10.8)

## 2019-10-08 DIAGNOSIS — H81.03 MENIERE DISEASE, BILATERAL: ICD-10-CM

## 2019-10-08 RX ORDER — DIAZEPAM 5 MG/1
TABLET ORAL
Qty: 60 TABLET | Refills: 0 | OUTPATIENT
Start: 2019-10-08

## 2019-10-10 ENCOUNTER — OFFICE VISIT (OUTPATIENT)
Dept: FAMILY MEDICINE CLINIC | Facility: CLINIC | Age: 74
End: 2019-10-10

## 2019-10-10 VITALS
DIASTOLIC BLOOD PRESSURE: 83 MMHG | RESPIRATION RATE: 16 BRPM | HEIGHT: 70 IN | HEART RATE: 82 BPM | TEMPERATURE: 97.9 F | BODY MASS INDEX: 27.35 KG/M2 | WEIGHT: 191 LBS | SYSTOLIC BLOOD PRESSURE: 126 MMHG

## 2019-10-10 DIAGNOSIS — E03.9 ACQUIRED HYPOTHYROIDISM: ICD-10-CM

## 2019-10-10 DIAGNOSIS — I10 BENIGN ESSENTIAL HTN: Primary | ICD-10-CM

## 2019-10-10 DIAGNOSIS — K21.9 GASTROESOPHAGEAL REFLUX DISEASE WITHOUT ESOPHAGITIS: ICD-10-CM

## 2019-10-10 DIAGNOSIS — Z23 IMMUNIZATION DUE: ICD-10-CM

## 2019-10-10 DIAGNOSIS — H81.03 MENIERE DISEASE, BILATERAL: ICD-10-CM

## 2019-10-10 DIAGNOSIS — J30.1 CHRONIC SEASONAL ALLERGIC RHINITIS DUE TO POLLEN: ICD-10-CM

## 2019-10-10 DIAGNOSIS — E78.2 MIXED HYPERLIPIDEMIA: ICD-10-CM

## 2019-10-10 PROBLEM — M54.50 LOW BACK PAIN: Status: ACTIVE | Noted: 2019-10-10

## 2019-10-10 PROBLEM — C80.1 MALIGNANT NEOPLASTIC DISEASE (HCC): Status: ACTIVE | Noted: 2019-10-10

## 2019-10-10 PROCEDURE — 90653 IIV ADJUVANT VACCINE IM: CPT | Performed by: FAMILY MEDICINE

## 2019-10-10 PROCEDURE — 99214 OFFICE O/P EST MOD 30 MIN: CPT | Performed by: FAMILY MEDICINE

## 2019-10-10 PROCEDURE — G0008 ADMIN INFLUENZA VIRUS VAC: HCPCS | Performed by: FAMILY MEDICINE

## 2019-10-10 RX ORDER — IRBESARTAN 300 MG/1
300 TABLET ORAL DAILY
Qty: 90 TABLET | Refills: 1 | Status: SHIPPED | OUTPATIENT
Start: 2019-10-10 | End: 2020-03-09

## 2019-10-10 RX ORDER — SIMVASTATIN 20 MG
20 TABLET ORAL NIGHTLY
Qty: 90 TABLET | Refills: 1 | Status: SHIPPED | OUTPATIENT
Start: 2019-10-10 | End: 2020-03-05

## 2019-10-10 RX ORDER — LEVOTHYROXINE SODIUM 0.03 MG/1
25 TABLET ORAL DAILY
Qty: 90 TABLET | Refills: 1 | Status: SHIPPED | OUTPATIENT
Start: 2019-10-10 | End: 2020-03-05

## 2019-10-10 RX ORDER — FLUTICASONE PROPIONATE 50 MCG
2 SPRAY, SUSPENSION (ML) NASAL DAILY
Qty: 3 BOTTLE | Refills: 3 | Status: SHIPPED | OUTPATIENT
Start: 2019-10-10 | End: 2020-10-22 | Stop reason: SDUPTHER

## 2019-10-10 RX ORDER — DIAZEPAM 5 MG/1
5 TABLET ORAL EVERY 12 HOURS PRN
Qty: 60 TABLET | Refills: 2 | Status: SHIPPED | OUTPATIENT
Start: 2019-10-10 | End: 2020-01-13 | Stop reason: SDUPTHER

## 2019-10-10 RX ORDER — OMEPRAZOLE 20 MG/1
20 CAPSULE, DELAYED RELEASE ORAL DAILY
Qty: 90 CAPSULE | Refills: 1 | Status: SHIPPED | OUTPATIENT
Start: 2019-10-10 | End: 2020-03-05

## 2019-10-10 NOTE — PROGRESS NOTES
Subjective   Kali Crespo is a 74 y.o. male.     History of Present Illness     Chief Complaint:   Chief Complaint   Patient presents with   • Hypertension     med refill - shemar  -    • Hyperlipidemia   • Hypothyroidism   • Anxiety   • Heartburn   • Immunizations     HD FLU LEFT DELTOID TODAY        Kali Crespo 74 y.o. male who presents today for Medical Management of the below listed issues and medication refills.  he has a problem list of   Patient Active Problem List   Diagnosis   • Cervical pain   • Migraine without aura and without status migrainosus, not intractable   • Vertigo   • Benign essential HTN   • Acid reflux   • Encounter for pre-operative cardiovascular clearance   • Abnormal EKG   • Hyperlipidemia   • Erectile dysfunction   • Prostate cancer (CMS/HCC)   • Meniere disease, bilateral   • Lumbar degenerative disc disease   • Low back pain   • Malignant neoplastic disease (CMS/HCC)   .  Since the last visit, he has overall felt well.  he has been compliant with   Current Outpatient Medications:   •  diazePAM (VALIUM) 5 MG tablet, Take 1 tablet by mouth Every 12 (Twelve) Hours As Needed (vertigo)., Disp: 60 tablet, Rfl: 2  •  fluticasone (FLONASE) 50 MCG/ACT nasal spray, 2 sprays into the nostril(s) as directed by provider Daily., Disp: 3 bottle, Rfl: 3  •  irbesartan (AVAPRO) 300 MG tablet, Take 1 tablet by mouth Daily., Disp: 90 tablet, Rfl: 1  •  levothyroxine (SYNTHROID) 25 MCG tablet, Take 1 tablet by mouth Daily., Disp: 30 tablet, Rfl: 5  •  omeprazole (priLOSEC) 20 MG capsule, Take 1 capsule by mouth Daily., Disp: 90 capsule, Rfl: 1  •  simvastatin (ZOCOR) 20 MG tablet, Take 1 tablet by mouth Every Night., Disp: 90 tablet, Rfl: 1  •  EPINEPHrine (EPIPEN) 0.3 MG/0.3ML solution auto-injector injection, INJECT 0.3 ML INTO THE APPROPRIATE MUSCLE UTD FOR 1 DOSE, Disp: , Rfl: 5  •  loratadine (CLARITIN) 10 MG tablet, Take 10 mg by mouth daily., Disp: , Rfl:   •  Multiple  "Vitamins-Minerals (CENTRAL-KEVON FOR SENIORS PO), Take by mouth., Disp: , Rfl:   •  nortriptyline (PAMELOR) 75 MG capsule, , Disp: , Rfl:   •  Omega-3 Fatty Acids (FISH OIL) 1000 MG capsule capsule, Take 1,000 mg by mouth daily., Disp: , Rfl:   •  sildenafil (REVATIO) 20 MG tablet, Take 2-3 tabs QD prn, Disp: 60 tablet, Rfl: 11  •  Thiamine HCl (VITAMIN B-1) 100 MG tablet, Take 500 mg by mouth daily., Disp: , Rfl: .  he denies medication side effects.    All of the other chronic condition(s) listed above are stable w/o issues.    /83   Pulse 82   Temp 97.9 °F (36.6 °C) (Oral)   Resp 16   Ht 177.8 cm (70\")   Wt 86.6 kg (191 lb)   BMI 27.41 kg/m²     Results for orders placed or performed in visit on 09/03/19   Comprehensive metabolic panel   Result Value Ref Range    Glucose 86 65 - 99 mg/dL    BUN 12 8 - 23 mg/dL    Creatinine 0.94 0.76 - 1.27 mg/dL    eGFR Non African Am 78 >60 mL/min/1.73    eGFR African Am 95 >60 mL/min/1.73    BUN/Creatinine Ratio 12.8 7.0 - 25.0    Sodium 142 136 - 145 mmol/L    Potassium 4.6 3.5 - 5.2 mmol/L    Chloride 104 98 - 107 mmol/L    Total CO2 28.2 22.0 - 29.0 mmol/L    Calcium 8.9 8.6 - 10.5 mg/dL    Total Protein 5.8 (L) 6.0 - 8.5 g/dL    Albumin 4.00 3.50 - 5.20 g/dL    Globulin 1.8 gm/dL    A/G Ratio 2.2 g/dL    Total Bilirubin 0.4 0.2 - 1.2 mg/dL    Alkaline Phosphatase 99 39 - 117 U/L    AST (SGOT) 15 1 - 40 U/L    ALT (SGPT) 14 1 - 41 U/L   Lipid Panel With LDL/HDL Ratio   Result Value Ref Range    Total Cholesterol 175 0 - 200 mg/dL    Triglycerides 79 0 - 150 mg/dL    HDL Cholesterol 42 40 - 60 mg/dL    VLDL Cholesterol 15.8 mg/dL    LDL Cholesterol  117 (H) 0 - 100 mg/dL    LDL/HDL Ratio 2.79    TSH   Result Value Ref Range    TSH 3.680 0.270 - 4.200 uIU/mL   CBC and Differential   Result Value Ref Range    WBC 5.60 3.40 - 10.80 10*3/mm3    RBC 4.46 4.14 - 5.80 10*6/mm3    Hemoglobin 14.2 13.0 - 17.7 g/dL    Hematocrit 43.5 37.5 - 51.0 %    MCV 97.5 (H) 79.0 - " 97.0 fL    MCH 31.8 26.6 - 33.0 pg    MCHC 32.6 31.5 - 35.7 g/dL    RDW 13.6 12.3 - 15.4 %    Platelets 240 140 - 450 10*3/mm3    Neutrophil Rel % 64.2 42.7 - 76.0 %    Lymphocyte Rel % 19.5 (L) 19.6 - 45.3 %    Monocyte Rel % 6.8 5.0 - 12.0 %    Eosinophil Rel % 7.7 (H) 0.3 - 6.2 %    Basophil Rel % 1.1 0.0 - 1.5 %    Neutrophils Absolute 3.60 1.70 - 7.00 10*3/mm3    Lymphocytes Absolute 1.09 0.70 - 3.10 10*3/mm3    Monocytes Absolute 0.38 0.10 - 0.90 10*3/mm3    Eosinophils Absolute 0.43 (H) 0.00 - 0.40 10*3/mm3    Basophils Absolute 0.06 0.00 - 0.20 10*3/mm3    Immature Granulocyte Rel % 0.7 (H) 0.0 - 0.5 %    Immature Grans Absolute 0.04 0.00 - 0.05 10*3/mm3    nRBC 0.0 0.0 - 0.2 /100 WBC           The following portions of the patient's history were reviewed and updated as appropriate: allergies, current medications, past family history, past medical history, past social history, past surgical history and problem list.    Review of Systems   Constitutional: Negative for activity change, chills, fatigue and fever.   Respiratory: Negative for cough and shortness of breath.    Cardiovascular: Negative for chest pain and palpitations.   Gastrointestinal: Negative for abdominal pain.   Endocrine: Negative for cold intolerance.   Psychiatric/Behavioral: Negative for behavioral problems and dysphoric mood. The patient is not nervous/anxious.        Objective   Physical Exam   Constitutional: He appears well-developed and well-nourished.   Neck: Neck supple. No thyromegaly present.   Cardiovascular: Normal rate and regular rhythm.   No murmur heard.  Pulmonary/Chest: Effort normal and breath sounds normal.   Abdominal: Bowel sounds are normal. There is no tenderness.   Psychiatric: He has a normal mood and affect. His behavior is normal.   Nursing note and vitals reviewed.  Labs reviewed with pt today during visit. All questions answered.    The patient has read and signed the [facility] Controlled Substance  Contract.  I will continue to see patient for regular follow up appointments.  They are well controlled on their medication.  KERVIN has been reviewed by me and is updated every 3 months. The patient is aware of the potential for addiction and dependence.    Assessment/Plan   Kali was seen today for hypertension, hyperlipidemia, hypothyroidism, anxiety, heartburn and immunizations.    Diagnoses and all orders for this visit:    Benign essential HTN  -     irbesartan (AVAPRO) 300 MG tablet; Take 1 tablet by mouth Daily.    Meniere disease, bilateral  -     diazePAM (VALIUM) 5 MG tablet; Take 1 tablet by mouth Every 12 (Twelve) Hours As Needed (vertigo).    Mixed hyperlipidemia  -     simvastatin (ZOCOR) 20 MG tablet; Take 1 tablet by mouth Every Night.    Gastroesophageal reflux disease without esophagitis  -     omeprazole (priLOSEC) 20 MG capsule; Take 1 capsule by mouth Daily.    Chronic seasonal allergic rhinitis due to pollen  -     fluticasone (FLONASE) 50 MCG/ACT nasal spray; 2 sprays into the nostril(s) as directed by provider Daily.    Acquired hypothyroidism  -     levothyroxine (SYNTHROID) 25 MCG tablet; Take 1 tablet by mouth Daily.    Immunization due  -     Fluad Tri 65yr+

## 2020-01-07 ENCOUNTER — OFFICE VISIT (OUTPATIENT)
Dept: RETAIL CLINIC | Facility: CLINIC | Age: 75
End: 2020-01-07

## 2020-01-07 VITALS
DIASTOLIC BLOOD PRESSURE: 84 MMHG | OXYGEN SATURATION: 98 % | TEMPERATURE: 97.9 F | BODY MASS INDEX: 29.2 KG/M2 | SYSTOLIC BLOOD PRESSURE: 136 MMHG | HEART RATE: 82 BPM | RESPIRATION RATE: 18 BRPM | HEIGHT: 70 IN | WEIGHT: 204 LBS

## 2020-01-07 DIAGNOSIS — R05.9 COUGHING: ICD-10-CM

## 2020-01-07 DIAGNOSIS — H81.03 MENIERE DISEASE, BILATERAL: ICD-10-CM

## 2020-01-07 DIAGNOSIS — J01.10 ACUTE FRONTAL SINUSITIS, RECURRENCE NOT SPECIFIED: Primary | ICD-10-CM

## 2020-01-07 DIAGNOSIS — H02.9 LESION OF RIGHT LOWER EYELID: ICD-10-CM

## 2020-01-07 DIAGNOSIS — H57.89 EYE SWELLING, RIGHT: ICD-10-CM

## 2020-01-07 PROCEDURE — 99213 OFFICE O/P EST LOW 20 MIN: CPT | Performed by: NURSE PRACTITIONER

## 2020-01-07 RX ORDER — MUPIROCIN CALCIUM 20 MG/G
CREAM TOPICAL 3 TIMES DAILY
Qty: 15 G | Refills: 0 | Status: SHIPPED | OUTPATIENT
Start: 2020-01-07 | End: 2021-08-09

## 2020-01-07 RX ORDER — CEPHALEXIN 500 MG/1
500 CAPSULE ORAL 2 TIMES DAILY
Qty: 20 CAPSULE | Refills: 0 | Status: SHIPPED | OUTPATIENT
Start: 2020-01-07 | End: 2020-01-13

## 2020-01-07 RX ORDER — DIAZEPAM 5 MG/1
TABLET ORAL
Qty: 60 TABLET | OUTPATIENT
Start: 2020-01-07

## 2020-01-07 RX ORDER — PREDNISONE 20 MG/1
20 TABLET ORAL 2 TIMES DAILY
Qty: 10 TABLET | Refills: 0 | Status: SHIPPED | OUTPATIENT
Start: 2020-01-07 | End: 2020-01-13

## 2020-01-07 NOTE — PROGRESS NOTES
"Subjective   Kali Crespo is a 74 y.o. male.       /84 (BP Location: Left arm, Patient Position: Sitting, Cuff Size: Adult)   Pulse 82   Temp 97.9 °F (36.6 °C) (Oral)   Resp 18   Ht 177.8 cm (70\")   Wt 92.5 kg (204 lb)   SpO2 98%   BMI 29.27 kg/m²     Sinusitis   This is a new problem. The current episode started 1 to 4 weeks ago. The problem has been gradually worsening since onset. There has been no fever. His pain is at a severity of 6/10. The pain is moderate. Associated symptoms include congestion, headaches and sinus pressure. (Lesion under right lower eye lid x 1 weeks, getting bigger with swelling) Past treatments include nothing. The treatment provided no relief.        The following portions of the patient's history were reviewed and updated as appropriate: current medications, past family history, past medical history, past social history, past surgical history and problem list.    Review of Systems   Constitutional: Positive for fatigue.   HENT: Positive for congestion, postnasal drip and sinus pressure.    Eyes: Positive for redness.   Respiratory: Negative.    Gastrointestinal: Negative.    Endocrine: Negative.    Genitourinary: Negative.    Musculoskeletal: Negative.    Skin: Positive for skin lesions.       Objective   Physical Exam   Constitutional: He appears well-developed and well-nourished.   HENT:   Head: Normocephalic and atraumatic.   Right Ear: Hearing, tympanic membrane, external ear and ear canal normal.   Left Ear: Hearing, tympanic membrane, external ear and ear canal normal.   Nose: Sinus tenderness and congestion present. Right sinus exhibits frontal sinus tenderness. Left sinus exhibits frontal sinus tenderness.   Mouth/Throat: Uvula is midline, oropharynx is clear and moist and mucous membranes are normal.   Eyes: Pupils are equal, round, and reactive to light. Conjunctivae and EOM are normal.   Neck: Normal range of motion.   Cardiovascular: Normal rate, regular " rhythm and normal heart sounds.   Pulmonary/Chest: Effort normal and breath sounds normal.   Abdominal: Soft. Bowel sounds are normal.   Skin: Skin is warm, dry and intact. Capillary refill takes less than 2 seconds. Turgor is normal.        Vitals reviewed.        Assessment/Plan   Diagnoses and all orders for this visit:    Acute frontal sinusitis, recurrence not specified  -     cephalexin (KEFLEX) 500 MG capsule; Take 1 capsule by mouth 2 (Two) Times a Day.    Coughing    Eye swelling, right  -     predniSONE (DELTASONE) 20 MG tablet; Take 1 tablet by mouth 2 (Two) Times a Day.    Lesion of right lower eyelid  -     mupirocin (BACTROBAN) 2 % cream; Apply  topically to the appropriate area as directed 3 (Three) Times a Day.      Pt to keep f/u appt with pcp, pt to take picture of eye for comparison at f/u visit

## 2020-01-08 RX ORDER — HYDROCODONE BITARTRATE AND ACETAMINOPHEN 7.5; 325 MG/1; MG/1
1 TABLET ORAL EVERY 8 HOURS PRN
COMMUNITY
Start: 2020-01-02 | End: 2022-05-11

## 2020-01-13 ENCOUNTER — OFFICE VISIT (OUTPATIENT)
Dept: FAMILY MEDICINE CLINIC | Facility: CLINIC | Age: 75
End: 2020-01-13

## 2020-01-13 VITALS
TEMPERATURE: 97.8 F | SYSTOLIC BLOOD PRESSURE: 136 MMHG | BODY MASS INDEX: 28.63 KG/M2 | DIASTOLIC BLOOD PRESSURE: 84 MMHG | RESPIRATION RATE: 18 BRPM | WEIGHT: 200 LBS | HEIGHT: 70 IN | HEART RATE: 92 BPM

## 2020-01-13 DIAGNOSIS — Z00.00 MEDICARE ANNUAL WELLNESS VISIT, SUBSEQUENT: Primary | ICD-10-CM

## 2020-01-13 DIAGNOSIS — Z23 IMMUNIZATION DUE: ICD-10-CM

## 2020-01-13 DIAGNOSIS — H81.03 MENIERE DISEASE, BILATERAL: ICD-10-CM

## 2020-01-13 PROCEDURE — 90732 PPSV23 VACC 2 YRS+ SUBQ/IM: CPT | Performed by: FAMILY MEDICINE

## 2020-01-13 PROCEDURE — G0439 PPPS, SUBSEQ VISIT: HCPCS | Performed by: FAMILY MEDICINE

## 2020-01-13 PROCEDURE — G0009 ADMIN PNEUMOCOCCAL VACCINE: HCPCS | Performed by: FAMILY MEDICINE

## 2020-01-13 PROCEDURE — 99212 OFFICE O/P EST SF 10 MIN: CPT | Performed by: FAMILY MEDICINE

## 2020-01-13 RX ORDER — CEPHALEXIN 500 MG/1
500 CAPSULE ORAL 2 TIMES DAILY
COMMUNITY
End: 2020-02-04

## 2020-01-13 RX ORDER — DIAZEPAM 5 MG/1
5 TABLET ORAL EVERY 12 HOURS PRN
Qty: 60 TABLET | Refills: 2 | Status: SHIPPED | OUTPATIENT
Start: 2020-01-13 | End: 2020-04-13 | Stop reason: SDUPTHER

## 2020-01-13 NOTE — PATIENT INSTRUCTIONS
Medicare Wellness  Personal Prevention Plan of Service     Date of Office Visit:  2020  Encounter Provider:  Nathan Arango MD  Place of Service:  [facility]  Patient Name: Kali Crespo  :  1945    As part of the Medicare Wellness portion of your visit today, we are providing you with this personalized preventive plan of services (PPPS). This plan is based upon recommendations of the [place] Preventive Services Task Force (USPSTF) and the Advisory Committee on Immunization Practices (ACIP).    This lists the preventive care services that should be considered, and provides dates of when you are due. Items listed as completed are up-to-date and do not require any further intervention.    Health Maintenance   Topic Date Due   • Pneumococcal Vaccine Once at 65 Years Old  2010   • AAA SCREEN (ONE-TIME)  01/10/2019   • ZOSTER VACCINE (2 of 2) 2020 (Originally 2011)   • PROSTATE CANCER SCREENING  2020   • LIPID PANEL  2020   • MEDICARE ANNUAL WELLNESS  2021   • COLONOSCOPY  2025   • INFLUENZA VACCINE  Completed   • HEPATITIS C SCREENING  Discontinued   • COLOGUARD  Discontinued   • TDAP/TD VACCINES  Discontinued       No orders of the defined types were placed in this encounter.      Return in about 3 months (around 2020) for Recheck.

## 2020-01-13 NOTE — PROGRESS NOTES
The ABCs of the Annual Wellness Visit  Subsequent Medicare Wellness Visit    Chief Complaint   Patient presents with   • MEDICARE WELLNESS   • MENIERE DISEASE BILATERAL     MED REFILL - KERVIN - NO LABS  - TEMO        Subjective   History of Present Illness:  Kali Crespo is a 74 y.o. male who presents for a Subsequent Medicare Wellness Visit.    HEALTH RISK ASSESSMENT    Recent Hospitalizations:  No hospitalization(s) within the last year.    Current Medical Providers:  Patient Care Team:  Nathan Arango MD as PCP - General  Nathan Arango MD as PCP - Family Medicine  Nathan Arango MD as PCP - Claims Attributed  Nena Ramey APRN (Dermatology)    Smoking Status:  Social History     Tobacco Use   Smoking Status Former Smoker   • Packs/day: 0.25   • Types: Cigarettes   • Start date:    • Last attempt to quit:    • Years since quittin.0   Smokeless Tobacco Never Used   Tobacco Comment    40 years ago       Alcohol Consumption:  Social History     Substance and Sexual Activity   Alcohol Use No    Comment: seldom       Depression Screen:   PHQ-2/PHQ-9 Depression Screening 2020   Little interest or pleasure in doing things 0   Feeling down, depressed, or hopeless 0   Trouble falling or staying asleep, or sleeping too much 0   Feeling tired or having little energy 0   Poor appetite or overeating 0   Feeling bad about yourself - or that you are a failure or have let yourself or your family down 0   Trouble concentrating on things, such as reading the newspaper or watching television 0   Moving or speaking so slowly that other people could have noticed. Or the opposite - being so fidgety or restless that you have been moving around a lot more than usual 0   Thoughts that you would be better off dead, or of hurting yourself in some way 0   Total Score 0   If you checked off any problems, how difficult have these problems made it for you to do your work, take care of things at home, or  get along with other people? Not difficult at all       Fall Risk Screen:  SHEILA Fall Risk Assessment was completed, and patient is at LOW risk for falls.Assessment completed on:1/13/2020    Health Habits and Functional and Cognitive Screening:  Functional & Cognitive Status 1/13/2020   Do you have difficulty preparing food and eating? No   Do you have difficulty bathing yourself, getting dressed or grooming yourself? No   Do you have difficulty using the toilet? No   Do you have difficulty moving around from place to place? No   Do you have trouble with steps or getting out of a bed or a chair? No   Current Diet Well Balanced Diet   Dental Exam Up to date   Eye Exam Up to date   Exercise (times per week) 0 times per week   Current Exercise Activities Include None   Do you need help using the phone?  No   Are you deaf or do you have serious difficulty hearing?  No   Do you need help with transportation? No   Do you need help shopping? No   Do you need help preparing meals?  No   Do you need help with housework?  No   Do you need help with laundry? No   Do you need help taking your medications? No   Do you need help managing money? No   Do you ever drive or ride in a car without wearing a seat belt? No   Have you felt unusual stress, anger or loneliness in the last month? No   Who do you live with? Spouse   If you need help, do you have trouble finding someone available to you? Yes   Have you been bothered in the last four weeks by sexual problems? No   Do you have difficulty concentrating, remembering or making decisions? No         Does the patient have evidence of cognitive impairment? No    Asprin use counseling:Does not need ASA (and currently is not on it)    Age-appropriate Screening Schedule:  Refer to the list below for future screening recommendations based on patient's age, sex and/or medical conditions. Orders for these recommended tests are listed in the plan section. The patient has been provided with  a written plan.    Health Maintenance   Topic Date Due   • ZOSTER VACCINE (2 of 2) 04/30/2020 (Originally 2/26/2011)   • PROSTATE CANCER SCREENING  03/11/2020   • LIPID PANEL  09/05/2020   • COLONOSCOPY  05/28/2025   • INFLUENZA VACCINE  Completed   • TDAP/TD VACCINES  Discontinued          The following portions of the patient's history were reviewed and updated as appropriate: allergies, current medications, past family history, past medical history, past social history, past surgical history and problem list.    Outpatient Medications Prior to Visit   Medication Sig Dispense Refill   • diazePAM (VALIUM) 5 MG tablet Take 1 tablet by mouth Every 12 (Twelve) Hours As Needed (vertigo). 60 tablet 2   • cephalexin (KEFLEX) 500 MG capsule Take 500 mg by mouth 2 (Two) Times a Day.     • EPINEPHrine (EPIPEN) 0.3 MG/0.3ML solution auto-injector injection INJECT 0.3 ML INTO THE APPROPRIATE MUSCLE UTD FOR 1 DOSE  5   • fluticasone (FLONASE) 50 MCG/ACT nasal spray 2 sprays into the nostril(s) as directed by provider Daily. 3 bottle 3   • HYDROcodone-acetaminophen (NORCO) 7.5-325 MG per tablet TK 1 T PO BID UTD     • irbesartan (AVAPRO) 300 MG tablet Take 1 tablet by mouth Daily. 90 tablet 1   • levothyroxine (SYNTHROID) 25 MCG tablet Take 1 tablet by mouth Daily. 90 tablet 1   • loratadine (CLARITIN) 10 MG tablet Take 10 mg by mouth daily.     • Multiple Vitamins-Minerals (CENTRAL-KEVON FOR SENIORS PO) Take by mouth.     • mupirocin (BACTROBAN) 2 % cream Apply  topically to the appropriate area as directed 3 (Three) Times a Day. 15 g 0   • nortriptyline (PAMELOR) 75 MG capsule      • Omega-3 Fatty Acids (FISH OIL) 1000 MG capsule capsule Take 1,000 mg by mouth daily.     • omeprazole (priLOSEC) 20 MG capsule Take 1 capsule by mouth Daily. 90 capsule 1   • sildenafil (REVATIO) 20 MG tablet Take 2-3 tabs QD prn 60 tablet 11   • simvastatin (ZOCOR) 20 MG tablet Take 1 tablet by mouth Every Night. 90 tablet 1   • Thiamine HCl  "(VITAMIN B-1) 100 MG tablet Take 500 mg by mouth daily.     • cephalexin (KEFLEX) 500 MG capsule Take 1 capsule by mouth 2 (Two) Times a Day. 20 capsule 0   • predniSONE (DELTASONE) 20 MG tablet Take 1 tablet by mouth 2 (Two) Times a Day. 10 tablet 0     No facility-administered medications prior to visit.        Patient Active Problem List   Diagnosis   • Cervical pain   • Migraine without aura and without status migrainosus, not intractable   • Vertigo   • Benign essential HTN   • Acid reflux   • Encounter for pre-operative cardiovascular clearance   • Abnormal EKG   • Hyperlipidemia   • Erectile dysfunction   • Prostate cancer (CMS/HCC)   • Meniere disease, bilateral   • Lumbar degenerative disc disease   • Low back pain   • Malignant neoplastic disease (CMS/HCC)       Advanced Care Planning:  Patient has an advance directive - a copy has been provided and is visible in patient header    Review of Systems    Compared to one year ago, the patient feels his physical health is the same.  Compared to one year ago, the patient feels his mental health is the same.    Reviewed chart for potential of high risk medication in the elderly: yes  Reviewed chart for potential of harmful drug interactions in the elderly:yes    Objective         Vitals:    01/13/20 0953   BP: 136/84   Pulse: 92   Resp: 18   Temp: 97.8 °F (36.6 °C)   TempSrc: Oral   Weight: 90.7 kg (200 lb)   Height: 177.8 cm (70\")   PainSc:   5   PainLoc: Back       Body mass index is 28.7 kg/m².  Discussed the patient's BMI with him. The BMI is above average; BMI management plan is completed.    Physical Exam          Assessment/Plan   Medicare Risks and Personalized Health Plan  CMS Preventative Services Quick Reference  Cardiovascular risk    The above risks/problems have been discussed with the patient.  Pertinent information has been shared with the patient in the After Visit Summary.  Follow up plans and orders are seen below in the Assessment/Plan " Section.    Diagnoses and all orders for this visit:    1. Medicare annual wellness visit, subsequent (Primary)    2. Meniere disease, bilateral  -     diazePAM (VALIUM) 5 MG tablet; Take 1 tablet by mouth Every 12 (Twelve) Hours As Needed (vertigo).  Dispense: 60 tablet; Refill: 2    3. Immunization due  -     Pneumococcal Polysaccharide Vaccine 23-Valent Greater Than or Equal To 3yo Subcutaneous / IM      Follow Up:  Return in about 3 months (around 4/13/2020) for Recheck.     An After Visit Summary and PPPS were given to the patient.

## 2020-01-13 NOTE — PROGRESS NOTES
Subjective   Kali Crespo is a 74 y.o. male.     History of Present Illness     Chief Complaint:   Chief Complaint   Patient presents with   • MEDICARE WELLNESS   • MENIERE DISEASE BILATERAL     MED REFILL - KERVIN - NO LABS  - WALVALENTINS        Kali Crespo 74 y.o. male who presents today for Medical Management of the below listed issues and medication refills.  he has a problem list of   Patient Active Problem List   Diagnosis   • Cervical pain   • Migraine without aura and without status migrainosus, not intractable   • Vertigo   • Benign essential HTN   • Acid reflux   • Encounter for pre-operative cardiovascular clearance   • Abnormal EKG   • Hyperlipidemia   • Erectile dysfunction   • Prostate cancer (CMS/HCC)   • Meniere disease, bilateral   • Lumbar degenerative disc disease   • Low back pain   • Malignant neoplastic disease (CMS/HCC)   .  Since the last visit, he has overall felt well.  he has been compliant with   Current Outpatient Medications:   •  diazePAM (VALIUM) 5 MG tablet, Take 1 tablet by mouth Every 12 (Twelve) Hours As Needed (vertigo)., Disp: 60 tablet, Rfl: 2  •  cephalexin (KEFLEX) 500 MG capsule, Take 500 mg by mouth 2 (Two) Times a Day., Disp: , Rfl:   •  EPINEPHrine (EPIPEN) 0.3 MG/0.3ML solution auto-injector injection, INJECT 0.3 ML INTO THE APPROPRIATE MUSCLE UTD FOR 1 DOSE, Disp: , Rfl: 5  •  fluticasone (FLONASE) 50 MCG/ACT nasal spray, 2 sprays into the nostril(s) as directed by provider Daily., Disp: 3 bottle, Rfl: 3  •  HYDROcodone-acetaminophen (NORCO) 7.5-325 MG per tablet, TK 1 T PO BID UTD, Disp: , Rfl:   •  irbesartan (AVAPRO) 300 MG tablet, Take 1 tablet by mouth Daily., Disp: 90 tablet, Rfl: 1  •  levothyroxine (SYNTHROID) 25 MCG tablet, Take 1 tablet by mouth Daily., Disp: 90 tablet, Rfl: 1  •  loratadine (CLARITIN) 10 MG tablet, Take 10 mg by mouth daily., Disp: , Rfl:   •  Multiple Vitamins-Minerals (CENTRAL-KEVON FOR SENIORS PO), Take by mouth., Disp: , Rfl:  "  •  mupirocin (BACTROBAN) 2 % cream, Apply  topically to the appropriate area as directed 3 (Three) Times a Day., Disp: 15 g, Rfl: 0  •  nortriptyline (PAMELOR) 75 MG capsule, , Disp: , Rfl:   •  Omega-3 Fatty Acids (FISH OIL) 1000 MG capsule capsule, Take 1,000 mg by mouth daily., Disp: , Rfl:   •  omeprazole (priLOSEC) 20 MG capsule, Take 1 capsule by mouth Daily., Disp: 90 capsule, Rfl: 1  •  sildenafil (REVATIO) 20 MG tablet, Take 2-3 tabs QD prn, Disp: 60 tablet, Rfl: 11  •  simvastatin (ZOCOR) 20 MG tablet, Take 1 tablet by mouth Every Night., Disp: 90 tablet, Rfl: 1  •  Thiamine HCl (VITAMIN B-1) 100 MG tablet, Take 500 mg by mouth daily., Disp: , Rfl: .  he denies medication side effects.    All of the other chronic condition(s) listed above are stable w/o issues.    /84   Pulse 92   Temp 97.8 °F (36.6 °C) (Oral)   Resp 18   Ht 177.8 cm (70\")   Wt 90.7 kg (200 lb)   BMI 28.70 kg/m²     Results for orders placed or performed in visit on 09/03/19   Comprehensive metabolic panel   Result Value Ref Range    Glucose 86 65 - 99 mg/dL    BUN 12 8 - 23 mg/dL    Creatinine 0.94 0.76 - 1.27 mg/dL    eGFR Non African Am 78 >60 mL/min/1.73    eGFR African Am 95 >60 mL/min/1.73    BUN/Creatinine Ratio 12.8 7.0 - 25.0    Sodium 142 136 - 145 mmol/L    Potassium 4.6 3.5 - 5.2 mmol/L    Chloride 104 98 - 107 mmol/L    Total CO2 28.2 22.0 - 29.0 mmol/L    Calcium 8.9 8.6 - 10.5 mg/dL    Total Protein 5.8 (L) 6.0 - 8.5 g/dL    Albumin 4.00 3.50 - 5.20 g/dL    Globulin 1.8 gm/dL    A/G Ratio 2.2 g/dL    Total Bilirubin 0.4 0.2 - 1.2 mg/dL    Alkaline Phosphatase 99 39 - 117 U/L    AST (SGOT) 15 1 - 40 U/L    ALT (SGPT) 14 1 - 41 U/L   Lipid Panel With LDL/HDL Ratio   Result Value Ref Range    Total Cholesterol 175 0 - 200 mg/dL    Triglycerides 79 0 - 150 mg/dL    HDL Cholesterol 42 40 - 60 mg/dL    VLDL Cholesterol 15.8 mg/dL    LDL Cholesterol  117 (H) 0 - 100 mg/dL    LDL/HDL Ratio 2.79    TSH   Result Value " Ref Range    TSH 3.680 0.270 - 4.200 uIU/mL   CBC and Differential   Result Value Ref Range    WBC 5.60 3.40 - 10.80 10*3/mm3    RBC 4.46 4.14 - 5.80 10*6/mm3    Hemoglobin 14.2 13.0 - 17.7 g/dL    Hematocrit 43.5 37.5 - 51.0 %    MCV 97.5 (H) 79.0 - 97.0 fL    MCH 31.8 26.6 - 33.0 pg    MCHC 32.6 31.5 - 35.7 g/dL    RDW 13.6 12.3 - 15.4 %    Platelets 240 140 - 450 10*3/mm3    Neutrophil Rel % 64.2 42.7 - 76.0 %    Lymphocyte Rel % 19.5 (L) 19.6 - 45.3 %    Monocyte Rel % 6.8 5.0 - 12.0 %    Eosinophil Rel % 7.7 (H) 0.3 - 6.2 %    Basophil Rel % 1.1 0.0 - 1.5 %    Neutrophils Absolute 3.60 1.70 - 7.00 10*3/mm3    Lymphocytes Absolute 1.09 0.70 - 3.10 10*3/mm3    Monocytes Absolute 0.38 0.10 - 0.90 10*3/mm3    Eosinophils Absolute 0.43 (H) 0.00 - 0.40 10*3/mm3    Basophils Absolute 0.06 0.00 - 0.20 10*3/mm3    Immature Granulocyte Rel % 0.7 (H) 0.0 - 0.5 %    Immature Grans Absolute 0.04 0.00 - 0.05 10*3/mm3    nRBC 0.0 0.0 - 0.2 /100 WBC           The following portions of the patient's history were reviewed and updated as appropriate: allergies, current medications, past family history, past medical history, past social history, past surgical history and problem list.    Review of Systems   Constitutional: Negative for activity change, chills, fatigue and fever.   Respiratory: Negative for cough and shortness of breath.    Cardiovascular: Negative for chest pain and palpitations.   Gastrointestinal: Negative for abdominal pain.   Endocrine: Negative for cold intolerance.   Psychiatric/Behavioral: Negative for behavioral problems and dysphoric mood. The patient is not nervous/anxious.        Objective   Physical Exam   Constitutional: He appears well-developed and well-nourished.   Neck: Neck supple. No thyromegaly present.   Cardiovascular: Normal rate and regular rhythm.   No murmur heard.  Pulmonary/Chest: Effort normal and breath sounds normal.   Abdominal: Bowel sounds are normal. There is no tenderness.    Psychiatric: He has a normal mood and affect. His behavior is normal.   Nursing note and vitals reviewed.    The patient has read and signed the [facility] Controlled Substance Contract.  I will continue to see patient for regular follow up appointments.  They are well controlled on their medication.  KERVIN has been reviewed by me and is updated every 3 months. The patient is aware of the potential for addiction and dependence.    Assessment/Plan   Kali was seen today for medicare wellness and meniere disease bilateral.    Diagnoses and all orders for this visit:    Medicare annual wellness visit, subsequent    Meniere disease, bilateral  -     diazePAM (VALIUM) 5 MG tablet; Take 1 tablet by mouth Every 12 (Twelve) Hours As Needed (vertigo).    Immunization due  -     Pneumococcal Polysaccharide Vaccine 23-Valent Greater Than or Equal To 3yo Subcutaneous / IM

## 2020-02-04 ENCOUNTER — OFFICE VISIT (OUTPATIENT)
Dept: FAMILY MEDICINE CLINIC | Facility: CLINIC | Age: 75
End: 2020-02-04

## 2020-02-04 VITALS
WEIGHT: 187 LBS | SYSTOLIC BLOOD PRESSURE: 152 MMHG | HEART RATE: 93 BPM | BODY MASS INDEX: 26.77 KG/M2 | TEMPERATURE: 97.5 F | DIASTOLIC BLOOD PRESSURE: 90 MMHG | HEIGHT: 70 IN | RESPIRATION RATE: 18 BRPM

## 2020-02-04 DIAGNOSIS — I10 BENIGN ESSENTIAL HTN: Primary | ICD-10-CM

## 2020-02-04 PROCEDURE — 99213 OFFICE O/P EST LOW 20 MIN: CPT | Performed by: FAMILY MEDICINE

## 2020-02-04 RX ORDER — AMLODIPINE BESYLATE 5 MG/1
5 TABLET ORAL DAILY
Qty: 90 TABLET | Refills: 1 | Status: SHIPPED | OUTPATIENT
Start: 2020-02-04 | End: 2020-04-13 | Stop reason: SDUPTHER

## 2020-02-04 RX ORDER — AMLODIPINE BESYLATE 2.5 MG/1
2.5 TABLET ORAL DAILY
COMMUNITY
End: 2020-02-04

## 2020-02-04 NOTE — PROGRESS NOTES
"Subjective   Kali Crespo is a 74 y.o. male.     CC: Management of HTN    History of Present Illness     Pt returns today worried about his BPs as he's had some elevated readings in the community.      The following portions of the patient's history were reviewed and updated as appropriate: allergies, current medications, past family history, past medical history, past social history, past surgical history and problem list.    Review of Systems   Constitutional: Negative for activity change, chills, fatigue and fever.   Respiratory: Negative for cough and shortness of breath.    Cardiovascular: Negative for chest pain and palpitations.   Gastrointestinal: Negative for abdominal pain.   Endocrine: Negative for cold intolerance.   Psychiatric/Behavioral: Negative for behavioral problems and dysphoric mood. The patient is not nervous/anxious.        /90   Pulse 93   Temp 97.5 °F (36.4 °C) (Oral)   Resp 18   Ht 177.8 cm (70\")   Wt 84.8 kg (187 lb)   BMI 26.83 kg/m²     Objective   Physical Exam   Constitutional: He appears well-developed and well-nourished.   Neck: Neck supple. No thyromegaly present.   Cardiovascular: Normal rate and regular rhythm.   No murmur heard.  Pulmonary/Chest: Effort normal and breath sounds normal.   Abdominal: Bowel sounds are normal. There is no tenderness.   Psychiatric: He has a normal mood and affect. His behavior is normal.   Nursing note and vitals reviewed.      Assessment/Plan   Kali was seen today for hypertension.    Diagnoses and all orders for this visit:    Benign essential HTN  Comments:  uncontrolled  Orders:  -     amLODIPine (NORVASC) 5 MG tablet; Take 1 tablet by mouth Daily.               "

## 2020-03-05 DIAGNOSIS — K21.9 GASTROESOPHAGEAL REFLUX DISEASE WITHOUT ESOPHAGITIS: ICD-10-CM

## 2020-03-05 DIAGNOSIS — E03.9 ACQUIRED HYPOTHYROIDISM: ICD-10-CM

## 2020-03-05 DIAGNOSIS — E78.2 MIXED HYPERLIPIDEMIA: ICD-10-CM

## 2020-03-05 RX ORDER — OMEPRAZOLE 20 MG/1
20 CAPSULE, DELAYED RELEASE ORAL DAILY
Qty: 90 CAPSULE | Refills: 0 | Status: SHIPPED | OUTPATIENT
Start: 2020-03-05 | End: 2020-04-13 | Stop reason: SDUPTHER

## 2020-03-05 RX ORDER — LEVOTHYROXINE SODIUM 0.03 MG/1
25 TABLET ORAL DAILY
Qty: 90 TABLET | Refills: 0 | Status: SHIPPED | OUTPATIENT
Start: 2020-03-05 | End: 2020-04-13 | Stop reason: SDUPTHER

## 2020-03-05 RX ORDER — SIMVASTATIN 20 MG
20 TABLET ORAL NIGHTLY
Qty: 90 TABLET | Refills: 0 | Status: SHIPPED | OUTPATIENT
Start: 2020-03-05 | End: 2020-04-13 | Stop reason: SDUPTHER

## 2020-03-09 DIAGNOSIS — I10 BENIGN ESSENTIAL HTN: ICD-10-CM

## 2020-03-09 RX ORDER — IRBESARTAN 300 MG/1
300 TABLET ORAL DAILY
Qty: 90 TABLET | Refills: 0 | Status: SHIPPED | OUTPATIENT
Start: 2020-03-09 | End: 2020-04-13 | Stop reason: SDUPTHER

## 2020-03-11 ENCOUNTER — TELEPHONE (OUTPATIENT)
Dept: FAMILY MEDICINE CLINIC | Facility: CLINIC | Age: 75
End: 2020-03-11

## 2020-03-11 DIAGNOSIS — I10 BENIGN ESSENTIAL HTN: Primary | ICD-10-CM

## 2020-03-11 DIAGNOSIS — E78.2 MIXED HYPERLIPIDEMIA: ICD-10-CM

## 2020-03-11 DIAGNOSIS — E03.9 ACQUIRED HYPOTHYROIDISM: ICD-10-CM

## 2020-03-13 LAB
ALBUMIN SERPL-MCNC: 4.1 G/DL (ref 3.5–5.2)
ALBUMIN/GLOB SERPL: 2.1 G/DL
ALP SERPL-CCNC: 94 U/L (ref 39–117)
ALT SERPL-CCNC: 16 U/L (ref 1–41)
AST SERPL-CCNC: 13 U/L (ref 1–40)
BASOPHILS # BLD AUTO: 0.05 10*3/MM3 (ref 0–0.2)
BASOPHILS NFR BLD AUTO: 0.8 % (ref 0–1.5)
BILIRUB SERPL-MCNC: 0.4 MG/DL (ref 0.2–1.2)
BUN SERPL-MCNC: 17 MG/DL (ref 8–23)
BUN/CREAT SERPL: 17.3 (ref 7–25)
CALCIUM SERPL-MCNC: 9.2 MG/DL (ref 8.6–10.5)
CHLORIDE SERPL-SCNC: 99 MMOL/L (ref 98–107)
CHOLEST SERPL-MCNC: 185 MG/DL (ref 0–200)
CO2 SERPL-SCNC: 29 MMOL/L (ref 22–29)
CREAT SERPL-MCNC: 0.98 MG/DL (ref 0.76–1.27)
EOSINOPHIL # BLD AUTO: 0.31 10*3/MM3 (ref 0–0.4)
EOSINOPHIL NFR BLD AUTO: 4.9 % (ref 0.3–6.2)
ERYTHROCYTE [DISTWIDTH] IN BLOOD BY AUTOMATED COUNT: 13.6 % (ref 12.3–15.4)
GLOBULIN SER CALC-MCNC: 2 GM/DL
GLUCOSE SERPL-MCNC: 91 MG/DL (ref 65–99)
HCT VFR BLD AUTO: 41.6 % (ref 37.5–51)
HDLC SERPL-MCNC: 48 MG/DL (ref 40–60)
HGB BLD-MCNC: 14.3 G/DL (ref 13–17.7)
IMM GRANULOCYTES # BLD AUTO: 0.02 10*3/MM3 (ref 0–0.05)
IMM GRANULOCYTES NFR BLD AUTO: 0.3 % (ref 0–0.5)
LDLC SERPL CALC-MCNC: 117 MG/DL (ref 0–100)
LDLC/HDLC SERPL: 2.44 {RATIO}
LYMPHOCYTES # BLD AUTO: 1.62 10*3/MM3 (ref 0.7–3.1)
LYMPHOCYTES NFR BLD AUTO: 25.6 % (ref 19.6–45.3)
MCH RBC QN AUTO: 31.6 PG (ref 26.6–33)
MCHC RBC AUTO-ENTMCNC: 34.4 G/DL (ref 31.5–35.7)
MCV RBC AUTO: 91.8 FL (ref 79–97)
MONOCYTES # BLD AUTO: 0.41 10*3/MM3 (ref 0.1–0.9)
MONOCYTES NFR BLD AUTO: 6.5 % (ref 5–12)
NEUTROPHILS # BLD AUTO: 3.91 10*3/MM3 (ref 1.7–7)
NEUTROPHILS NFR BLD AUTO: 61.9 % (ref 42.7–76)
NRBC BLD AUTO-RTO: 0.2 /100 WBC (ref 0–0.2)
PLATELET # BLD AUTO: 243 10*3/MM3 (ref 140–450)
POTASSIUM SERPL-SCNC: 4.5 MMOL/L (ref 3.5–5.2)
PROT SERPL-MCNC: 6.1 G/DL (ref 6–8.5)
RBC # BLD AUTO: 4.53 10*6/MM3 (ref 4.14–5.8)
SODIUM SERPL-SCNC: 139 MMOL/L (ref 136–145)
T4 FREE SERPL-MCNC: 1.39 NG/DL (ref 0.93–1.7)
TRIGL SERPL-MCNC: 99 MG/DL (ref 0–150)
TSH SERPL DL<=0.005 MIU/L-ACNC: 5.83 UIU/ML (ref 0.27–4.2)
VLDLC SERPL CALC-MCNC: 19.8 MG/DL
WBC # BLD AUTO: 6.32 10*3/MM3 (ref 3.4–10.8)

## 2020-04-08 DIAGNOSIS — I10 BENIGN ESSENTIAL HTN: ICD-10-CM

## 2020-04-08 DIAGNOSIS — E78.2 MIXED HYPERLIPIDEMIA: ICD-10-CM

## 2020-04-08 DIAGNOSIS — H81.03 MENIERE DISEASE, BILATERAL: ICD-10-CM

## 2020-04-08 DIAGNOSIS — K21.9 GASTROESOPHAGEAL REFLUX DISEASE WITHOUT ESOPHAGITIS: ICD-10-CM

## 2020-04-08 DIAGNOSIS — E03.9 ACQUIRED HYPOTHYROIDISM: ICD-10-CM

## 2020-04-08 RX ORDER — OMEPRAZOLE 20 MG/1
20 CAPSULE, DELAYED RELEASE ORAL DAILY
Qty: 90 CAPSULE | Refills: 0 | OUTPATIENT
Start: 2020-04-08

## 2020-04-08 RX ORDER — DIAZEPAM 5 MG/1
5 TABLET ORAL EVERY 12 HOURS PRN
Qty: 60 TABLET | Refills: 2 | OUTPATIENT
Start: 2020-04-08

## 2020-04-08 RX ORDER — IRBESARTAN 300 MG/1
300 TABLET ORAL DAILY
Qty: 90 TABLET | Refills: 0 | OUTPATIENT
Start: 2020-04-08

## 2020-04-08 RX ORDER — AMLODIPINE BESYLATE 5 MG/1
5 TABLET ORAL DAILY
Qty: 90 TABLET | Refills: 1 | OUTPATIENT
Start: 2020-04-08

## 2020-04-08 RX ORDER — SIMVASTATIN 20 MG
20 TABLET ORAL NIGHTLY
Qty: 90 TABLET | Refills: 0 | OUTPATIENT
Start: 2020-04-08

## 2020-04-08 RX ORDER — LEVOTHYROXINE SODIUM 0.03 MG/1
25 TABLET ORAL DAILY
Qty: 90 TABLET | Refills: 0 | OUTPATIENT
Start: 2020-04-08

## 2020-04-12 DIAGNOSIS — H81.03 MENIERE DISEASE, BILATERAL: ICD-10-CM

## 2020-04-13 ENCOUNTER — OFFICE VISIT (OUTPATIENT)
Dept: FAMILY MEDICINE CLINIC | Facility: CLINIC | Age: 75
End: 2020-04-13

## 2020-04-13 VITALS — BODY MASS INDEX: 27.12 KG/M2 | DIASTOLIC BLOOD PRESSURE: 83 MMHG | SYSTOLIC BLOOD PRESSURE: 129 MMHG | WEIGHT: 189 LBS

## 2020-04-13 DIAGNOSIS — K21.9 GASTROESOPHAGEAL REFLUX DISEASE WITHOUT ESOPHAGITIS: ICD-10-CM

## 2020-04-13 DIAGNOSIS — H81.03 MENIERE DISEASE, BILATERAL: ICD-10-CM

## 2020-04-13 DIAGNOSIS — N52.9 ERECTILE DYSFUNCTION, UNSPECIFIED ERECTILE DYSFUNCTION TYPE: ICD-10-CM

## 2020-04-13 DIAGNOSIS — E78.2 MIXED HYPERLIPIDEMIA: ICD-10-CM

## 2020-04-13 DIAGNOSIS — I10 BENIGN ESSENTIAL HTN: Primary | ICD-10-CM

## 2020-04-13 DIAGNOSIS — E03.9 ACQUIRED HYPOTHYROIDISM: ICD-10-CM

## 2020-04-13 PROCEDURE — 99443 PR PHYS/QHP TELEPHONE EVALUATION 21-30 MIN: CPT | Performed by: FAMILY MEDICINE

## 2020-04-13 RX ORDER — DIAZEPAM 5 MG/1
TABLET ORAL
Qty: 60 TABLET | OUTPATIENT
Start: 2020-04-13

## 2020-04-13 RX ORDER — DIAZEPAM 5 MG/1
5 TABLET ORAL EVERY 12 HOURS PRN
Qty: 60 TABLET | Refills: 2 | Status: SHIPPED | OUTPATIENT
Start: 2020-04-13 | End: 2020-07-13 | Stop reason: SDUPTHER

## 2020-04-13 RX ORDER — OMEPRAZOLE 20 MG/1
20 CAPSULE, DELAYED RELEASE ORAL DAILY
Qty: 90 CAPSULE | Refills: 1 | Status: SHIPPED | OUTPATIENT
Start: 2020-04-13 | End: 2020-08-18

## 2020-04-13 RX ORDER — IRBESARTAN 300 MG/1
300 TABLET ORAL DAILY
Qty: 90 TABLET | Refills: 1 | Status: SHIPPED | OUTPATIENT
Start: 2020-04-13 | End: 2020-08-18

## 2020-04-13 RX ORDER — LEVOTHYROXINE SODIUM 0.03 MG/1
25 TABLET ORAL DAILY
Qty: 90 TABLET | Refills: 1 | Status: SHIPPED | OUTPATIENT
Start: 2020-04-13 | End: 2020-08-18

## 2020-04-13 RX ORDER — SIMVASTATIN 20 MG
20 TABLET ORAL NIGHTLY
Qty: 90 TABLET | Refills: 1 | Status: SHIPPED | OUTPATIENT
Start: 2020-04-13 | End: 2020-08-18

## 2020-04-13 RX ORDER — EPINEPHRINE 0.3 MG/.3ML
0.3 INJECTION SUBCUTANEOUS ONCE
Qty: 1 EACH | Refills: 0 | Status: SHIPPED | OUTPATIENT
Start: 2020-04-13 | End: 2020-04-13

## 2020-04-13 RX ORDER — AMLODIPINE BESYLATE 5 MG/1
5 TABLET ORAL DAILY
Qty: 90 TABLET | Refills: 1 | Status: SHIPPED | OUTPATIENT
Start: 2020-04-13 | End: 2020-08-18

## 2020-04-13 RX ORDER — SILDENAFIL CITRATE 20 MG/1
TABLET ORAL
Qty: 60 TABLET | Refills: 11 | Status: SHIPPED | OUTPATIENT
Start: 2020-04-13 | End: 2023-02-20

## 2020-04-13 NOTE — PROGRESS NOTES
Subjective   Kali Crespo is a 74 y.o. male.     CC: Telehealth for Medical Management    History of Present Illness     Chief Complaint:   Chief Complaint   Patient presents with   • Hypertension   • Anxiety   • Heartburn   • Hypothyroidism       Kali Crespo 74 y.o. male who presents today for Medical Management of the below listed issues and medication refills.  he has a problem list of   Patient Active Problem List   Diagnosis   • Cervical pain   • Migraine without aura and without status migrainosus, not intractable   • Vertigo   • Benign essential HTN   • Acid reflux   • Encounter for pre-operative cardiovascular clearance   • Abnormal EKG   • Hyperlipidemia   • Erectile dysfunction   • Prostate cancer (CMS/HCC)   • Meniere disease, bilateral   • Lumbar degenerative disc disease   • Low back pain   • Malignant neoplastic disease (CMS/HCC)   .  Since the last visit, he has overall felt well.  he has been compliant with   Current Outpatient Medications:   •  EPINEPHrine (EPIPEN) 0.3 MG/0.3ML solution auto-injector injection, Inject 0.3 mL into the appropriate muscle as directed by prescriber 1 (One) Time for 1 dose., Disp: 1 each, Rfl: 0  •  fluticasone (FLONASE) 50 MCG/ACT nasal spray, 2 sprays into the nostril(s) as directed by provider Daily., Disp: 3 bottle, Rfl: 3  •  HYDROcodone-acetaminophen (NORCO) 7.5-325 MG per tablet, TK 1 T PO BID UTD, Disp: , Rfl:   •  loratadine (CLARITIN) 10 MG tablet, Take 10 mg by mouth daily., Disp: , Rfl:   •  Multiple Vitamins-Minerals (CENTRAL-KEVON FOR SENIORS PO), Take by mouth., Disp: , Rfl:   •  mupirocin (BACTROBAN) 2 % cream, Apply  topically to the appropriate area as directed 3 (Three) Times a Day., Disp: 15 g, Rfl: 0  •  nortriptyline (PAMELOR) 75 MG capsule, , Disp: , Rfl:   •  Omega-3 Fatty Acids (FISH OIL) 1000 MG capsule capsule, Take 1,000 mg by mouth daily., Disp: , Rfl:   •  sildenafil (REVATIO) 20 MG tablet, Take 2-3 tabs QD prn, Disp: 60 tablet,  Rfl: 11  •  amLODIPine (NORVASC) 5 MG tablet, Take 1 tablet by mouth Daily., Disp: 90 tablet, Rfl: 1  •  diazePAM (VALIUM) 5 MG tablet, Take 1 tablet by mouth Every 12 (Twelve) Hours As Needed (vertigo)., Disp: 60 tablet, Rfl: 2  •  irbesartan (AVAPRO) 300 MG tablet, Take 1 tablet by mouth Daily., Disp: 90 tablet, Rfl: 1  •  levothyroxine (SYNTHROID, LEVOTHROID) 25 MCG tablet, Take 1 tablet by mouth Daily., Disp: 90 tablet, Rfl: 1  •  omeprazole (priLOSEC) 20 MG capsule, Take 1 capsule by mouth Daily., Disp: 90 capsule, Rfl: 1  •  simvastatin (ZOCOR) 20 MG tablet, Take 1 tablet by mouth Every Night., Disp: 90 tablet, Rfl: 1  •  Thiamine HCl (VITAMIN B-1) 100 MG tablet, Take 500 mg by mouth daily., Disp: , Rfl: .  he denies medication side effects.    Advance Care Planning   ACP discussion was held with the patient during this visit. Patient has an advance directive in EMR which is still valid.   All of the other chronic condition(s) listed above are stable w/o issues.    /83   Wt 85.7 kg (189 lb)   BMI 27.12 kg/m²     Results for orders placed or performed in visit on 03/11/20   Comprehensive metabolic panel   Result Value Ref Range    Glucose 91 65 - 99 mg/dL    BUN 17 8 - 23 mg/dL    Creatinine 0.98 0.76 - 1.27 mg/dL    eGFR Non African Am 75 >60 mL/min/1.73    eGFR African Am 91 >60 mL/min/1.73    BUN/Creatinine Ratio 17.3 7.0 - 25.0    Sodium 139 136 - 145 mmol/L    Potassium 4.5 3.5 - 5.2 mmol/L    Chloride 99 98 - 107 mmol/L    Total CO2 29.0 22.0 - 29.0 mmol/L    Calcium 9.2 8.6 - 10.5 mg/dL    Total Protein 6.1 6.0 - 8.5 g/dL    Albumin 4.10 3.50 - 5.20 g/dL    Globulin 2.0 gm/dL    A/G Ratio 2.1 g/dL    Total Bilirubin 0.4 0.2 - 1.2 mg/dL    Alkaline Phosphatase 94 39 - 117 U/L    AST (SGOT) 13 1 - 40 U/L    ALT (SGPT) 16 1 - 41 U/L   Lipid Panel With LDL/HDL Ratio   Result Value Ref Range    Total Cholesterol 185 0 - 200 mg/dL    Triglycerides 99 0 - 150 mg/dL    HDL Cholesterol 48 40 - 60 mg/dL     VLDL Cholesterol 19.8 mg/dL    LDL Cholesterol  117 (H) 0 - 100 mg/dL    LDL/HDL Ratio 2.44    TSH   Result Value Ref Range    TSH 5.830 (H) 0.270 - 4.200 uIU/mL   T4, Free   Result Value Ref Range    Free T4 1.39 0.93 - 1.70 ng/dL   CBC and Differential   Result Value Ref Range    WBC 6.32 3.40 - 10.80 10*3/mm3    RBC 4.53 4.14 - 5.80 10*6/mm3    Hemoglobin 14.3 13.0 - 17.7 g/dL    Hematocrit 41.6 37.5 - 51.0 %    MCV 91.8 79.0 - 97.0 fL    MCH 31.6 26.6 - 33.0 pg    MCHC 34.4 31.5 - 35.7 g/dL    RDW 13.6 12.3 - 15.4 %    Platelets 243 140 - 450 10*3/mm3    Neutrophil Rel % 61.9 42.7 - 76.0 %    Lymphocyte Rel % 25.6 19.6 - 45.3 %    Monocyte Rel % 6.5 5.0 - 12.0 %    Eosinophil Rel % 4.9 0.3 - 6.2 %    Basophil Rel % 0.8 0.0 - 1.5 %    Neutrophils Absolute 3.91 1.70 - 7.00 10*3/mm3    Lymphocytes Absolute 1.62 0.70 - 3.10 10*3/mm3    Monocytes Absolute 0.41 0.10 - 0.90 10*3/mm3    Eosinophils Absolute 0.31 0.00 - 0.40 10*3/mm3    Basophils Absolute 0.05 0.00 - 0.20 10*3/mm3    Immature Granulocyte Rel % 0.3 0.0 - 0.5 %    Immature Grans Absolute 0.02 0.00 - 0.05 10*3/mm3    nRBC 0.2 0.0 - 0.2 /100 WBC           The following portions of the patient's history were reviewed and updated as appropriate: allergies, current medications, past family history, past medical history, past social history, past surgical history and problem list.    Review of Systems   Constitutional: Negative for activity change, chills and fever.   Respiratory: Negative for cough.    Cardiovascular: Negative for chest pain.   Psychiatric/Behavioral: Negative for dysphoric mood.       Objective   Physical Exam   Constitutional: No distress.   Psychiatric: He has a normal mood and affect. His behavior is normal. Thought content normal.   Labs reviewed with pt today during visit. All questions answered.      Assessment/Plan   Kali was seen today for hypertension, anxiety, heartburn and hypothyroidism.    Diagnoses and all orders for this  visit:    Benign essential HTN  -     amLODIPine (NORVASC) 5 MG tablet; Take 1 tablet by mouth Daily.  -     irbesartan (AVAPRO) 300 MG tablet; Take 1 tablet by mouth Daily.    Meniere disease, bilateral  -     diazePAM (VALIUM) 5 MG tablet; Take 1 tablet by mouth Every 12 (Twelve) Hours As Needed (vertigo).    Acquired hypothyroidism  -     levothyroxine (SYNTHROID, LEVOTHROID) 25 MCG tablet; Take 1 tablet by mouth Daily.    Gastroesophageal reflux disease without esophagitis  -     omeprazole (priLOSEC) 20 MG capsule; Take 1 capsule by mouth Daily.    Mixed hyperlipidemia  -     simvastatin (ZOCOR) 20 MG tablet; Take 1 tablet by mouth Every Night.    Erectile dysfunction, unspecified erectile dysfunction type  -     sildenafil (REVATIO) 20 MG tablet; Take 2-3 tabs QD prn    Other orders  -     EPINEPHrine (EPIPEN) 0.3 MG/0.3ML solution auto-injector injection; Inject 0.3 mL into the appropriate muscle as directed by prescriber 1 (One) Time for 1 dose.    Spent 21 minutes with chart and interview.

## 2020-07-13 ENCOUNTER — OFFICE VISIT (OUTPATIENT)
Dept: FAMILY MEDICINE CLINIC | Facility: CLINIC | Age: 75
End: 2020-07-13

## 2020-07-13 VITALS
SYSTOLIC BLOOD PRESSURE: 129 MMHG | DIASTOLIC BLOOD PRESSURE: 77 MMHG | BODY MASS INDEX: 29.18 KG/M2 | WEIGHT: 203.8 LBS | RESPIRATION RATE: 16 BRPM | HEART RATE: 86 BPM | HEIGHT: 70 IN | TEMPERATURE: 98.1 F

## 2020-07-13 DIAGNOSIS — H81.03 MENIERE DISEASE, BILATERAL: Primary | ICD-10-CM

## 2020-07-13 DIAGNOSIS — I10 BENIGN ESSENTIAL HTN: ICD-10-CM

## 2020-07-13 PROCEDURE — 99213 OFFICE O/P EST LOW 20 MIN: CPT | Performed by: FAMILY MEDICINE

## 2020-07-13 RX ORDER — DIAZEPAM 5 MG/1
5 TABLET ORAL EVERY 12 HOURS PRN
Qty: 60 TABLET | Refills: 2 | Status: SHIPPED | OUTPATIENT
Start: 2020-07-13 | End: 2020-10-22 | Stop reason: SDUPTHER

## 2020-07-13 NOTE — PROGRESS NOTES
Subjective   Kali Crespo is a 74 y.o. male.     History of Present Illness     Chief Complaint:   Chief Complaint   Patient presents with   • Anxiety     med refill shemar  - [facility]    • Hypertension       Kali Crespo 74 y.o. male who presents today for Medical Management of the below listed issues and medication refills.  he has a problem list of   Patient Active Problem List   Diagnosis   • Cervical pain   • Migraine without aura and without status migrainosus, not intractable   • Vertigo   • Benign essential HTN   • Acid reflux   • Encounter for pre-operative cardiovascular clearance   • Abnormal EKG   • Hyperlipidemia   • Erectile dysfunction   • Prostate cancer (CMS/HCC)   • Meniere disease, bilateral   • Lumbar degenerative disc disease   • Low back pain   • Malignant neoplastic disease (CMS/HCC)   • Alternating esotropia   • Diplopia   .  Since the last visit, he has overall felt well.  he has been compliant with   Current Outpatient Medications:   •  diazePAM (VALIUM) 5 MG tablet, Take 1 tablet by mouth Every 12 (Twelve) Hours As Needed (vertigo)., Disp: 60 tablet, Rfl: 2  •  amLODIPine (NORVASC) 5 MG tablet, Take 1 tablet by mouth Daily., Disp: 90 tablet, Rfl: 1  •  fluticasone (FLONASE) 50 MCG/ACT nasal spray, 2 sprays into the nostril(s) as directed by provider Daily., Disp: 3 bottle, Rfl: 3  •  HYDROcodone-acetaminophen (NORCO) 7.5-325 MG per tablet, TK 1 T PO BID UTD, Disp: , Rfl:   •  irbesartan (AVAPRO) 300 MG tablet, Take 1 tablet by mouth Daily., Disp: 90 tablet, Rfl: 1  •  levothyroxine (SYNTHROID, LEVOTHROID) 25 MCG tablet, Take 1 tablet by mouth Daily., Disp: 90 tablet, Rfl: 1  •  loratadine (CLARITIN) 10 MG tablet, Take 10 mg by mouth daily., Disp: , Rfl:   •  Multiple Vitamins-Minerals (CENTRAL-KEVON FOR SENIORS PO), Take by mouth., Disp: , Rfl:   •  mupirocin (BACTROBAN) 2 % cream, Apply  topically to the appropriate area as directed 3 (Three) Times a Day., Disp: 15 g, Rfl:  "0  •  nortriptyline (PAMELOR) 75 MG capsule, , Disp: , Rfl:   •  Omega-3 Fatty Acids (FISH OIL) 1000 MG capsule capsule, Take 1,000 mg by mouth daily., Disp: , Rfl:   •  omeprazole (priLOSEC) 20 MG capsule, Take 1 capsule by mouth Daily., Disp: 90 capsule, Rfl: 1  •  sildenafil (REVATIO) 20 MG tablet, Take 2-3 tabs QD prn, Disp: 60 tablet, Rfl: 11  •  simvastatin (ZOCOR) 20 MG tablet, Take 1 tablet by mouth Every Night., Disp: 90 tablet, Rfl: 1  •  Thiamine HCl (VITAMIN B-1) 100 MG tablet, Take 500 mg by mouth daily., Disp: , Rfl: .  he denies medication side effects.    All of the other chronic condition(s) listed above are stable w/o issues.    /77   Pulse 86   Temp 98.1 °F (36.7 °C) (Oral)   Resp 16   Ht 177.8 cm (70\")   Wt 92.4 kg (203 lb 12.8 oz)   BMI 29.24 kg/m²     Results for orders placed or performed in visit on 03/11/20   Comprehensive metabolic panel   Result Value Ref Range    Glucose 91 65 - 99 mg/dL    BUN 17 8 - 23 mg/dL    Creatinine 0.98 0.76 - 1.27 mg/dL    eGFR Non African Am 75 >60 mL/min/1.73    eGFR African Am 91 >60 mL/min/1.73    BUN/Creatinine Ratio 17.3 7.0 - 25.0    Sodium 139 136 - 145 mmol/L    Potassium 4.5 3.5 - 5.2 mmol/L    Chloride 99 98 - 107 mmol/L    Total CO2 29.0 22.0 - 29.0 mmol/L    Calcium 9.2 8.6 - 10.5 mg/dL    Total Protein 6.1 6.0 - 8.5 g/dL    Albumin 4.10 3.50 - 5.20 g/dL    Globulin 2.0 gm/dL    A/G Ratio 2.1 g/dL    Total Bilirubin 0.4 0.2 - 1.2 mg/dL    Alkaline Phosphatase 94 39 - 117 U/L    AST (SGOT) 13 1 - 40 U/L    ALT (SGPT) 16 1 - 41 U/L   Lipid Panel With LDL/HDL Ratio   Result Value Ref Range    Total Cholesterol 185 0 - 200 mg/dL    Triglycerides 99 0 - 150 mg/dL    HDL Cholesterol 48 40 - 60 mg/dL    VLDL Cholesterol 19.8 mg/dL    LDL Cholesterol  117 (H) 0 - 100 mg/dL    LDL/HDL Ratio 2.44    TSH   Result Value Ref Range    TSH 5.830 (H) 0.270 - 4.200 uIU/mL   T4, Free   Result Value Ref Range    Free T4 1.39 0.93 - 1.70 ng/dL   CBC and " Differential   Result Value Ref Range    WBC 6.32 3.40 - 10.80 10*3/mm3    RBC 4.53 4.14 - 5.80 10*6/mm3    Hemoglobin 14.3 13.0 - 17.7 g/dL    Hematocrit 41.6 37.5 - 51.0 %    MCV 91.8 79.0 - 97.0 fL    MCH 31.6 26.6 - 33.0 pg    MCHC 34.4 31.5 - 35.7 g/dL    RDW 13.6 12.3 - 15.4 %    Platelets 243 140 - 450 10*3/mm3    Neutrophil Rel % 61.9 42.7 - 76.0 %    Lymphocyte Rel % 25.6 19.6 - 45.3 %    Monocyte Rel % 6.5 5.0 - 12.0 %    Eosinophil Rel % 4.9 0.3 - 6.2 %    Basophil Rel % 0.8 0.0 - 1.5 %    Neutrophils Absolute 3.91 1.70 - 7.00 10*3/mm3    Lymphocytes Absolute 1.62 0.70 - 3.10 10*3/mm3    Monocytes Absolute 0.41 0.10 - 0.90 10*3/mm3    Eosinophils Absolute 0.31 0.00 - 0.40 10*3/mm3    Basophils Absolute 0.05 0.00 - 0.20 10*3/mm3    Immature Granulocyte Rel % 0.3 0.0 - 0.5 %    Immature Grans Absolute 0.02 0.00 - 0.05 10*3/mm3    nRBC 0.2 0.0 - 0.2 /100 WBC           The following portions of the patient's history were reviewed and updated as appropriate: allergies, current medications, past family history, past medical history, past social history, past surgical history and problem list.    Review of Systems   Constitutional: Negative for activity change, chills, fatigue and fever.   Respiratory: Negative for cough and shortness of breath.    Cardiovascular: Negative for chest pain and palpitations.   Gastrointestinal: Negative for abdominal pain.   Endocrine: Negative for cold intolerance.   Psychiatric/Behavioral: Negative for behavioral problems and dysphoric mood. The patient is not nervous/anxious.        Objective   Physical Exam   Constitutional: He appears well-developed and well-nourished.   Neck: Neck supple. No thyromegaly present.   Cardiovascular: Normal rate and regular rhythm.   No murmur heard.  Pulmonary/Chest: Effort normal and breath sounds normal.   Abdominal: Bowel sounds are normal. There is no tenderness.   Psychiatric: He has a normal mood and affect. His behavior is normal.    Nursing note and vitals reviewed.      Assessment/Plan   Kali was seen today for anxiety and hypertension.    Diagnoses and all orders for this visit:    Meniere disease, bilateral  -     diazePAM (VALIUM) 5 MG tablet; Take 1 tablet by mouth Every 12 (Twelve) Hours As Needed (vertigo).    Benign essential HTN    Pt's BPs good and pt to continue current medications.

## 2020-07-14 DIAGNOSIS — H81.03 MENIERE DISEASE, BILATERAL: ICD-10-CM

## 2020-07-14 RX ORDER — DIAZEPAM 5 MG/1
TABLET ORAL
Qty: 60 TABLET | OUTPATIENT
Start: 2020-07-14

## 2020-08-18 DIAGNOSIS — I10 BENIGN ESSENTIAL HTN: ICD-10-CM

## 2020-08-18 DIAGNOSIS — E78.2 MIXED HYPERLIPIDEMIA: ICD-10-CM

## 2020-08-18 DIAGNOSIS — K21.9 GASTROESOPHAGEAL REFLUX DISEASE WITHOUT ESOPHAGITIS: ICD-10-CM

## 2020-08-18 DIAGNOSIS — E03.9 ACQUIRED HYPOTHYROIDISM: ICD-10-CM

## 2020-08-18 RX ORDER — LEVOTHYROXINE SODIUM 0.03 MG/1
25 TABLET ORAL DAILY
Qty: 90 TABLET | Refills: 0 | Status: SHIPPED | OUTPATIENT
Start: 2020-08-18 | End: 2020-10-22 | Stop reason: SDUPTHER

## 2020-08-18 RX ORDER — AMLODIPINE BESYLATE 5 MG/1
5 TABLET ORAL DAILY
Qty: 90 TABLET | Refills: 0 | Status: SHIPPED | OUTPATIENT
Start: 2020-08-18 | End: 2020-10-22 | Stop reason: SDUPTHER

## 2020-08-18 RX ORDER — OMEPRAZOLE 20 MG/1
20 CAPSULE, DELAYED RELEASE ORAL DAILY
Qty: 90 CAPSULE | Refills: 0 | Status: SHIPPED | OUTPATIENT
Start: 2020-08-18 | End: 2020-10-22 | Stop reason: SDUPTHER

## 2020-08-18 RX ORDER — IRBESARTAN 300 MG/1
300 TABLET ORAL DAILY
Qty: 90 TABLET | Refills: 0 | Status: SHIPPED | OUTPATIENT
Start: 2020-08-18 | End: 2020-10-22 | Stop reason: SDUPTHER

## 2020-08-18 RX ORDER — SIMVASTATIN 20 MG
TABLET ORAL
Qty: 90 TABLET | Refills: 0 | Status: SHIPPED | OUTPATIENT
Start: 2020-08-18 | End: 2020-10-22 | Stop reason: SDUPTHER

## 2020-09-08 ENCOUNTER — TELEPHONE (OUTPATIENT)
Dept: FAMILY MEDICINE CLINIC | Facility: CLINIC | Age: 75
End: 2020-09-08

## 2020-09-08 DIAGNOSIS — E78.2 MIXED HYPERLIPIDEMIA: ICD-10-CM

## 2020-09-08 DIAGNOSIS — I10 BENIGN ESSENTIAL HTN: Primary | ICD-10-CM

## 2020-09-08 DIAGNOSIS — E03.9 HYPOTHYROIDISM, UNSPECIFIED TYPE: ICD-10-CM

## 2020-10-02 LAB
ALBUMIN SERPL-MCNC: 4.5 G/DL (ref 3.5–5.2)
ALBUMIN/GLOB SERPL: 2.8 G/DL
ALP SERPL-CCNC: 115 U/L (ref 39–117)
ALT SERPL-CCNC: 22 U/L (ref 1–41)
AST SERPL-CCNC: 20 U/L (ref 1–40)
BASOPHILS # BLD AUTO: 0.03 10*3/MM3 (ref 0–0.2)
BASOPHILS NFR BLD AUTO: 0.7 % (ref 0–1.5)
BILIRUB SERPL-MCNC: 0.4 MG/DL (ref 0–1.2)
BUN SERPL-MCNC: 15 MG/DL (ref 8–23)
BUN/CREAT SERPL: 14.7 (ref 7–25)
CALCIUM SERPL-MCNC: 9.4 MG/DL (ref 8.6–10.5)
CHLORIDE SERPL-SCNC: 102 MMOL/L (ref 98–107)
CHOLEST SERPL-MCNC: 190 MG/DL (ref 0–200)
CO2 SERPL-SCNC: 28.7 MMOL/L (ref 22–29)
CREAT SERPL-MCNC: 1.02 MG/DL (ref 0.76–1.27)
EOSINOPHIL # BLD AUTO: 0.18 10*3/MM3 (ref 0–0.4)
EOSINOPHIL NFR BLD AUTO: 3.9 % (ref 0.3–6.2)
ERYTHROCYTE [DISTWIDTH] IN BLOOD BY AUTOMATED COUNT: 13 % (ref 12.3–15.4)
GLOBULIN SER CALC-MCNC: 1.6 GM/DL
GLUCOSE SERPL-MCNC: 94 MG/DL (ref 65–99)
HCT VFR BLD AUTO: 40.2 % (ref 37.5–51)
HDLC SERPL-MCNC: 46 MG/DL (ref 40–60)
HGB BLD-MCNC: 14 G/DL (ref 13–17.7)
IMM GRANULOCYTES # BLD AUTO: 0.03 10*3/MM3 (ref 0–0.05)
IMM GRANULOCYTES NFR BLD AUTO: 0.7 % (ref 0–0.5)
LDLC SERPL CALC-MCNC: 128 MG/DL (ref 0–100)
LDLC/HDLC SERPL: 2.78 {RATIO}
LYMPHOCYTES # BLD AUTO: 1.08 10*3/MM3 (ref 0.7–3.1)
LYMPHOCYTES NFR BLD AUTO: 23.5 % (ref 19.6–45.3)
MCH RBC QN AUTO: 30.5 PG (ref 26.6–33)
MCHC RBC AUTO-ENTMCNC: 34.8 G/DL (ref 31.5–35.7)
MCV RBC AUTO: 87.6 FL (ref 79–97)
MONOCYTES # BLD AUTO: 0.29 10*3/MM3 (ref 0.1–0.9)
MONOCYTES NFR BLD AUTO: 6.3 % (ref 5–12)
NEUTROPHILS # BLD AUTO: 2.99 10*3/MM3 (ref 1.7–7)
NEUTROPHILS NFR BLD AUTO: 64.9 % (ref 42.7–76)
NRBC BLD AUTO-RTO: 0 /100 WBC (ref 0–0.2)
PLATELET # BLD AUTO: 264 10*3/MM3 (ref 140–450)
POTASSIUM SERPL-SCNC: 4.8 MMOL/L (ref 3.5–5.2)
PROT SERPL-MCNC: 6.1 G/DL (ref 6–8.5)
RBC # BLD AUTO: 4.59 10*6/MM3 (ref 4.14–5.8)
SODIUM SERPL-SCNC: 138 MMOL/L (ref 136–145)
T4 FREE SERPL-MCNC: 1.32 NG/DL (ref 0.93–1.7)
TRIGL SERPL-MCNC: 81 MG/DL (ref 0–150)
TSH SERPL DL<=0.005 MIU/L-ACNC: 3.42 UIU/ML (ref 0.27–4.2)
VLDLC SERPL CALC-MCNC: 16.2 MG/DL
WBC # BLD AUTO: 4.6 10*3/MM3 (ref 3.4–10.8)

## 2020-10-14 DIAGNOSIS — H81.03 MENIERE DISEASE, BILATERAL: ICD-10-CM

## 2020-10-14 RX ORDER — DIAZEPAM 5 MG/1
TABLET ORAL
Qty: 60 TABLET | OUTPATIENT
Start: 2020-10-14

## 2020-10-22 ENCOUNTER — OFFICE VISIT (OUTPATIENT)
Dept: FAMILY MEDICINE CLINIC | Facility: CLINIC | Age: 75
End: 2020-10-22

## 2020-10-22 VITALS
HEIGHT: 70 IN | WEIGHT: 206 LBS | RESPIRATION RATE: 16 BRPM | HEART RATE: 85 BPM | SYSTOLIC BLOOD PRESSURE: 118 MMHG | TEMPERATURE: 97.5 F | OXYGEN SATURATION: 98 % | DIASTOLIC BLOOD PRESSURE: 78 MMHG | BODY MASS INDEX: 29.49 KG/M2

## 2020-10-22 DIAGNOSIS — E78.2 MIXED HYPERLIPIDEMIA: ICD-10-CM

## 2020-10-22 DIAGNOSIS — H81.03 MENIERE DISEASE, BILATERAL: ICD-10-CM

## 2020-10-22 DIAGNOSIS — I10 BENIGN ESSENTIAL HTN: Primary | ICD-10-CM

## 2020-10-22 DIAGNOSIS — J30.1 CHRONIC SEASONAL ALLERGIC RHINITIS DUE TO POLLEN: ICD-10-CM

## 2020-10-22 DIAGNOSIS — K21.9 GASTROESOPHAGEAL REFLUX DISEASE WITHOUT ESOPHAGITIS: ICD-10-CM

## 2020-10-22 DIAGNOSIS — E03.9 ACQUIRED HYPOTHYROIDISM: ICD-10-CM

## 2020-10-22 PROCEDURE — 99214 OFFICE O/P EST MOD 30 MIN: CPT | Performed by: FAMILY MEDICINE

## 2020-10-22 RX ORDER — DIAZEPAM 5 MG/1
5 TABLET ORAL EVERY 12 HOURS PRN
Qty: 60 TABLET | Refills: 2 | Status: SHIPPED | OUTPATIENT
Start: 2020-10-22 | End: 2021-01-25

## 2020-10-22 RX ORDER — LEVOTHYROXINE SODIUM 0.03 MG/1
25 TABLET ORAL DAILY
Qty: 90 TABLET | Refills: 1 | Status: SHIPPED | OUTPATIENT
Start: 2020-10-22 | End: 2021-04-27 | Stop reason: SDUPTHER

## 2020-10-22 RX ORDER — AMLODIPINE BESYLATE 5 MG/1
5 TABLET ORAL DAILY
Qty: 90 TABLET | Refills: 1 | Status: SHIPPED | OUTPATIENT
Start: 2020-10-22 | End: 2021-04-27 | Stop reason: SDUPTHER

## 2020-10-22 RX ORDER — IRBESARTAN 300 MG/1
300 TABLET ORAL DAILY
Qty: 90 TABLET | Refills: 1 | Status: SHIPPED | OUTPATIENT
Start: 2020-10-22 | End: 2021-04-27 | Stop reason: SDUPTHER

## 2020-10-22 RX ORDER — OMEPRAZOLE 20 MG/1
20 CAPSULE, DELAYED RELEASE ORAL DAILY
Qty: 90 CAPSULE | Refills: 1 | Status: SHIPPED | OUTPATIENT
Start: 2020-10-22 | End: 2021-04-28

## 2020-10-22 RX ORDER — FLUTICASONE PROPIONATE 50 MCG
2 SPRAY, SUSPENSION (ML) NASAL DAILY
Qty: 3 BOTTLE | Refills: 3 | Status: SHIPPED | OUTPATIENT
Start: 2020-10-22 | End: 2021-04-27 | Stop reason: SDUPTHER

## 2020-10-22 RX ORDER — SIMVASTATIN 20 MG
20 TABLET ORAL EVERY EVENING
Qty: 90 TABLET | Refills: 1 | Status: SHIPPED | OUTPATIENT
Start: 2020-10-22 | End: 2021-04-27 | Stop reason: SDUPTHER

## 2020-10-22 NOTE — PROGRESS NOTES
Subjective   Kali Crespo is a 75 y.o. male.     History of Present Illness     Chief Complaint:   Chief Complaint   Patient presents with   • Hypertension   • Hyperlipidemia   • Heartburn   • Anxiety   • Immunizations       Kali Crespo 75 y.o. male who presents today for Medical Management of the below listed issues and medication refills.  he has a problem list of   Patient Active Problem List   Diagnosis   • Cervical pain   • Migraine without aura and without status migrainosus, not intractable   • Vertigo   • Benign essential HTN   • Acid reflux   • Encounter for pre-operative cardiovascular clearance   • Abnormal EKG   • Hyperlipidemia   • Erectile dysfunction   • Prostate cancer (CMS/HCC)   • Meniere disease, bilateral   • Lumbar degenerative disc disease   • Low back pain   • Malignant neoplastic disease (CMS/HCC)   • Alternating esotropia   • Diplopia   .  Since the last visit, he has overall felt well.  he has been compliant with   Current Outpatient Medications:   •  HYDROcodone-acetaminophen (NORCO) 7.5-325 MG per tablet, TK 1 T PO BID UTD, Disp: , Rfl:   •  loratadine (CLARITIN) 10 MG tablet, Take 10 mg by mouth daily., Disp: , Rfl:   •  Multiple Vitamins-Minerals (CENTRAL-KEVON FOR SENIORS PO), Take by mouth., Disp: , Rfl:   •  mupirocin (BACTROBAN) 2 % cream, Apply  topically to the appropriate area as directed 3 (Three) Times a Day., Disp: 15 g, Rfl: 0  •  nortriptyline (PAMELOR) 75 MG capsule, , Disp: , Rfl:   •  Omega-3 Fatty Acids (FISH OIL) 1000 MG capsule capsule, Take 1,000 mg by mouth daily., Disp: , Rfl:   •  sildenafil (REVATIO) 20 MG tablet, Take 2-3 tabs QD prn, Disp: 60 tablet, Rfl: 11  •  Thiamine HCl (VITAMIN B-1) 100 MG tablet, Take 500 mg by mouth daily., Disp: , Rfl:   •  amLODIPine (NORVASC) 5 MG tablet, Take 1 tablet by mouth Daily., Disp: 90 tablet, Rfl: 1  •  diazePAM (VALIUM) 5 MG tablet, Take 1 tablet by mouth Every 12 (Twelve) Hours As Needed (vertigo)., Disp: 60  "tablet, Rfl: 2  •  fluticasone (FLONASE) 50 MCG/ACT nasal spray, 2 sprays into the nostril(s) as directed by provider Daily., Disp: 3 bottle, Rfl: 3  •  irbesartan (AVAPRO) 300 MG tablet, Take 1 tablet by mouth Daily., Disp: 90 tablet, Rfl: 1  •  levothyroxine (SYNTHROID, LEVOTHROID) 25 MCG tablet, Take 1 tablet by mouth Daily., Disp: 90 tablet, Rfl: 1  •  omeprazole (priLOSEC) 20 MG capsule, Take 1 capsule by mouth Daily., Disp: 90 capsule, Rfl: 1  •  simvastatin (ZOCOR) 20 MG tablet, Take 1 tablet by mouth Every Evening., Disp: 90 tablet, Rfl: 1.  he denies medication side effects.    All of the other chronic condition(s) listed above are stable w/o issues.    /78   Pulse 85   Temp 97.5 °F (36.4 °C)   Resp 16   Ht 177.8 cm (70\")   Wt 93.4 kg (206 lb)   SpO2 98%   BMI 29.56 kg/m²     Results for orders placed or performed in visit on 09/08/20   Comprehensive metabolic panel    Specimen: Blood   Result Value Ref Range    Glucose 94 65 - 99 mg/dL    BUN 15 8 - 23 mg/dL    Creatinine 1.02 0.76 - 1.27 mg/dL    eGFR Non African Am 71 >60 mL/min/1.73    eGFR African Am 86 >60 mL/min/1.73    BUN/Creatinine Ratio 14.7 7.0 - 25.0    Sodium 138 136 - 145 mmol/L    Potassium 4.8 3.5 - 5.2 mmol/L    Chloride 102 98 - 107 mmol/L    Total CO2 28.7 22.0 - 29.0 mmol/L    Calcium 9.4 8.6 - 10.5 mg/dL    Total Protein 6.1 6.0 - 8.5 g/dL    Albumin 4.50 3.50 - 5.20 g/dL    Globulin 1.6 gm/dL    A/G Ratio 2.8 g/dL    Total Bilirubin 0.4 0.0 - 1.2 mg/dL    Alkaline Phosphatase 115 39 - 117 U/L    AST (SGOT) 20 1 - 40 U/L    ALT (SGPT) 22 1 - 41 U/L   Lipid Panel With LDL/HDL Ratio    Specimen: Blood   Result Value Ref Range    Total Cholesterol 190 0 - 200 mg/dL    Triglycerides 81 0 - 150 mg/dL    HDL Cholesterol 46 40 - 60 mg/dL    VLDL Cholesterol Farrukh 16.2 mg/dL    LDL Chol Calc ([facility]) 128 (H) 0 - 100 mg/dL    LDL/HDL RATIO 2.78    TSH    Specimen: Blood   Result Value Ref Range    TSH 3.420 0.270 - 4.200 uIU/mL   T4, " Free    Specimen: Blood   Result Value Ref Range    Free T4 1.32 0.93 - 1.70 ng/dL   CBC and Differential    Specimen: Blood   Result Value Ref Range    WBC 4.60 3.40 - 10.80 10*3/mm3    RBC 4.59 4.14 - 5.80 10*6/mm3    Hemoglobin 14.0 13.0 - 17.7 g/dL    Hematocrit 40.2 37.5 - 51.0 %    MCV 87.6 79.0 - 97.0 fL    MCH 30.5 26.6 - 33.0 pg    MCHC 34.8 31.5 - 35.7 g/dL    RDW 13.0 12.3 - 15.4 %    Platelets 264 140 - 450 10*3/mm3    Neutrophil Rel % 64.9 42.7 - 76.0 %    Lymphocyte Rel % 23.5 19.6 - 45.3 %    Monocyte Rel % 6.3 5.0 - 12.0 %    Eosinophil Rel % 3.9 0.3 - 6.2 %    Basophil Rel % 0.7 0.0 - 1.5 %    Neutrophils Absolute 2.99 1.70 - 7.00 10*3/mm3    Lymphocytes Absolute 1.08 0.70 - 3.10 10*3/mm3    Monocytes Absolute 0.29 0.10 - 0.90 10*3/mm3    Eosinophils Absolute 0.18 0.00 - 0.40 10*3/mm3    Basophils Absolute 0.03 0.00 - 0.20 10*3/mm3    Immature Granulocyte Rel % 0.7 (H) 0.0 - 0.5 %    Immature Grans Absolute 0.03 0.00 - 0.05 10*3/mm3    nRBC 0.0 0.0 - 0.2 /100 WBC           The following portions of the patient's history were reviewed and updated as appropriate: allergies, current medications, past family history, past medical history, past social history, past surgical history and problem list.    Review of Systems   Constitutional: Negative for activity change, chills, fatigue and fever.   Respiratory: Negative for cough and shortness of breath.    Cardiovascular: Negative for chest pain and palpitations.   Gastrointestinal: Negative for abdominal pain.   Endocrine: Negative for cold intolerance.   Psychiatric/Behavioral: Negative for behavioral problems and dysphoric mood. The patient is not nervous/anxious.        Objective   Physical Exam  Constitutional:       General: He is not in acute distress.     Appearance: He is well-developed.   Cardiovascular:      Rate and Rhythm: Normal rate and regular rhythm.   Pulmonary:      Effort: Pulmonary effort is normal.      Breath sounds: Normal breath  sounds.   Neurological:      Mental Status: He is alert and oriented to person, place, and time.   Psychiatric:         Behavior: Behavior normal.         Thought Content: Thought content normal.     Labs reviewed with pt today during visit. All questions answered.      Assessment/Plan   Diagnoses and all orders for this visit:    1. Benign essential HTN (Primary)  -     amLODIPine (NORVASC) 5 MG tablet; Take 1 tablet by mouth Daily.  Dispense: 90 tablet; Refill: 1  -     irbesartan (AVAPRO) 300 MG tablet; Take 1 tablet by mouth Daily.  Dispense: 90 tablet; Refill: 1    2. Chronic seasonal allergic rhinitis due to pollen  -     fluticasone (FLONASE) 50 MCG/ACT nasal spray; 2 sprays into the nostril(s) as directed by provider Daily.  Dispense: 3 bottle; Refill: 3    3. Acquired hypothyroidism  -     levothyroxine (SYNTHROID, LEVOTHROID) 25 MCG tablet; Take 1 tablet by mouth Daily.  Dispense: 90 tablet; Refill: 1    4. Gastroesophageal reflux disease without esophagitis  -     omeprazole (priLOSEC) 20 MG capsule; Take 1 capsule by mouth Daily.  Dispense: 90 capsule; Refill: 1    5. Mixed hyperlipidemia  -     simvastatin (ZOCOR) 20 MG tablet; Take 1 tablet by mouth Every Evening.  Dispense: 90 tablet; Refill: 1    6. Meniere disease, bilateral  -     diazePAM (VALIUM) 5 MG tablet; Take 1 tablet by mouth Every 12 (Twelve) Hours As Needed (vertigo).  Dispense: 60 tablet; Refill: 2

## 2021-01-24 RX ORDER — DIAZEPAM 5 MG/1
5 TABLET ORAL EVERY 12 HOURS PRN
Qty: 60 TABLET | Refills: 2 | Status: CANCELLED | OUTPATIENT
Start: 2021-01-24

## 2021-01-24 NOTE — PROGRESS NOTES
Subjective   Kali Crespo is a 75 y.o. male.     History of Present Illness     Chief Complaint:   Chief Complaint   Patient presents with   • medicare wellness     due  - [facility]   • Menieres     med refill due shemar  to discuss dosage        Kali Crespo 75 y.o. male who presents today for Medical Management of the below listed issues and medication refills.  he has a problem list of   Patient Active Problem List   Diagnosis   • Cervical pain   • Migraine without aura and without status migrainosus, not intractable   • Vertigo   • Benign essential HTN   • Acid reflux   • Encounter for pre-operative cardiovascular clearance   • Abnormal EKG   • Hyperlipidemia   • Erectile dysfunction   • Prostate cancer (CMS/HCC)   • Meniere disease, bilateral   • Lumbar degenerative disc disease   • Low back pain   • Malignant neoplastic disease (CMS/HCC)   • Alternating esotropia   • Diplopia   .  Since the last visit, he has overall felt well. Pt is currently using 1/2 tab Valium BID and would like to drop the dose to 2mg.  he has been compliant with   Current Outpatient Medications:   •  amLODIPine (NORVASC) 5 MG tablet, Take 1 tablet by mouth Daily., Disp: 90 tablet, Rfl: 1  •  diazePAM (VALIUM) 2 MG tablet, Take 1 tablet by mouth Every 12 (Twelve) Hours As Needed (vertigo)., Disp: 60 tablet, Rfl: 2  •  fluticasone (FLONASE) 50 MCG/ACT nasal spray, 2 sprays into the nostril(s) as directed by provider Daily., Disp: 3 bottle, Rfl: 3  •  HYDROcodone-acetaminophen (NORCO) 7.5-325 MG per tablet, TK 1 T PO BID UTD, Disp: , Rfl:   •  irbesartan (AVAPRO) 300 MG tablet, Take 1 tablet by mouth Daily., Disp: 90 tablet, Rfl: 1  •  levothyroxine (SYNTHROID, LEVOTHROID) 25 MCG tablet, Take 1 tablet by mouth Daily., Disp: 90 tablet, Rfl: 1  •  loratadine (CLARITIN) 10 MG tablet, Take 10 mg by mouth daily., Disp: , Rfl:   •  Multiple Vitamins-Minerals (CENTRAL-KEVON FOR SENIORS PO), Take by mouth., Disp: , Rfl:   •   "mupirocin (BACTROBAN) 2 % cream, Apply  topically to the appropriate area as directed 3 (Three) Times a Day., Disp: 15 g, Rfl: 0  •  Omega-3 Fatty Acids (FISH OIL) 1000 MG capsule capsule, Take 1,000 mg by mouth daily., Disp: , Rfl:   •  omeprazole (priLOSEC) 20 MG capsule, Take 1 capsule by mouth Daily., Disp: 90 capsule, Rfl: 1  •  sildenafil (REVATIO) 20 MG tablet, Take 2-3 tabs QD prn, Disp: 60 tablet, Rfl: 11  •  simvastatin (ZOCOR) 20 MG tablet, Take 1 tablet by mouth Every Evening., Disp: 90 tablet, Rfl: 1  •  Thiamine HCl (VITAMIN B-1) 100 MG tablet, Take 500 mg by mouth daily., Disp: , Rfl: .  he denies medication side effects.    All of the other chronic condition(s) listed above are stable w/o issues.    /82   Pulse 74   Temp 97.5 °F (36.4 °C) (Oral)   Resp 16   Ht 177.8 cm (70\")   Wt 93.4 kg (206 lb)   BMI 29.56 kg/m²     Results for orders placed or performed in visit on 09/08/20   Comprehensive metabolic panel    Specimen: Blood   Result Value Ref Range    Glucose 94 65 - 99 mg/dL    BUN 15 8 - 23 mg/dL    Creatinine 1.02 0.76 - 1.27 mg/dL    eGFR Non African Am 71 >60 mL/min/1.73    eGFR African Am 86 >60 mL/min/1.73    BUN/Creatinine Ratio 14.7 7.0 - 25.0    Sodium 138 136 - 145 mmol/L    Potassium 4.8 3.5 - 5.2 mmol/L    Chloride 102 98 - 107 mmol/L    Total CO2 28.7 22.0 - 29.0 mmol/L    Calcium 9.4 8.6 - 10.5 mg/dL    Total Protein 6.1 6.0 - 8.5 g/dL    Albumin 4.50 3.50 - 5.20 g/dL    Globulin 1.6 gm/dL    A/G Ratio 2.8 g/dL    Total Bilirubin 0.4 0.0 - 1.2 mg/dL    Alkaline Phosphatase 115 39 - 117 U/L    AST (SGOT) 20 1 - 40 U/L    ALT (SGPT) 22 1 - 41 U/L   Lipid Panel With LDL/HDL Ratio    Specimen: Blood   Result Value Ref Range    Total Cholesterol 190 0 - 200 mg/dL    Triglycerides 81 0 - 150 mg/dL    HDL Cholesterol 46 40 - 60 mg/dL    VLDL Cholesterol Farrukh 16.2 mg/dL    LDL Chol Calc ([facility]) 128 (H) 0 - 100 mg/dL    LDL/HDL RATIO 2.78    TSH    Specimen: Blood   Result Value Ref " Range    TSH 3.420 0.270 - 4.200 uIU/mL   T4, Free    Specimen: Blood   Result Value Ref Range    Free T4 1.32 0.93 - 1.70 ng/dL   CBC and Differential    Specimen: Blood   Result Value Ref Range    WBC 4.60 3.40 - 10.80 10*3/mm3    RBC 4.59 4.14 - 5.80 10*6/mm3    Hemoglobin 14.0 13.0 - 17.7 g/dL    Hematocrit 40.2 37.5 - 51.0 %    MCV 87.6 79.0 - 97.0 fL    MCH 30.5 26.6 - 33.0 pg    MCHC 34.8 31.5 - 35.7 g/dL    RDW 13.0 12.3 - 15.4 %    Platelets 264 140 - 450 10*3/mm3    Neutrophil Rel % 64.9 42.7 - 76.0 %    Lymphocyte Rel % 23.5 19.6 - 45.3 %    Monocyte Rel % 6.3 5.0 - 12.0 %    Eosinophil Rel % 3.9 0.3 - 6.2 %    Basophil Rel % 0.7 0.0 - 1.5 %    Neutrophils Absolute 2.99 1.70 - 7.00 10*3/mm3    Lymphocytes Absolute 1.08 0.70 - 3.10 10*3/mm3    Monocytes Absolute 0.29 0.10 - 0.90 10*3/mm3    Eosinophils Absolute 0.18 0.00 - 0.40 10*3/mm3    Basophils Absolute 0.03 0.00 - 0.20 10*3/mm3    Immature Granulocyte Rel % 0.7 (H) 0.0 - 0.5 %    Immature Grans Absolute 0.03 0.00 - 0.05 10*3/mm3    nRBC 0.0 0.0 - 0.2 /100 WBC           The following portions of the patient's history were reviewed and updated as appropriate: allergies, current medications, past family history, past medical history, past social history, past surgical history and problem list.    Review of Systems   Constitutional: Negative for activity change, chills and fever.   Respiratory: Negative for cough.    Cardiovascular: Negative for chest pain.   Psychiatric/Behavioral: Negative for dysphoric mood.       Objective   Physical Exam  Constitutional:       General: He is not in acute distress.     Appearance: He is well-developed.   Pulmonary:      Effort: Pulmonary effort is normal.   Neurological:      Mental Status: He is alert and oriented to person, place, and time.   Psychiatric:         Behavior: Behavior normal.         Thought Content: Thought content normal.       The patient has read and signed the [facility] Substance  Contract.  I will continue to see patient for regular follow up appointments.  They are well controlled on their medication.  KERVIN has been reviewed by me and is updated every 3 months. The patient is aware of the potential for addiction and dependence.    Assessment/Plan   Diagnoses and all orders for this visit:    1. Medicare annual wellness visit, subsequent (Primary)    2. Meniere disease, bilateral  -     diazePAM (VALIUM) 2 MG tablet; Take 1 tablet by mouth Every 12 (Twelve) Hours As Needed (vertigo).  Dispense: 60 tablet; Refill: 2    3. Benign essential HTN    Other orders  -     Cancel: diazePAM (VALIUM) 5 MG tablet; Take 1 tablet by mouth Every 12 (Twelve) Hours As Needed (vertigo).  Dispense: 60 tablet; Refill: 2    Pt's BP up slightly over baseline. Continue with current rx/diet/exercise.

## 2021-01-25 ENCOUNTER — OFFICE VISIT (OUTPATIENT)
Dept: FAMILY MEDICINE CLINIC | Facility: CLINIC | Age: 76
End: 2021-01-25

## 2021-01-25 VITALS
RESPIRATION RATE: 16 BRPM | HEIGHT: 70 IN | TEMPERATURE: 97.5 F | BODY MASS INDEX: 29.49 KG/M2 | HEART RATE: 74 BPM | WEIGHT: 206 LBS | DIASTOLIC BLOOD PRESSURE: 82 MMHG | SYSTOLIC BLOOD PRESSURE: 140 MMHG

## 2021-01-25 DIAGNOSIS — H81.03 MENIERE DISEASE, BILATERAL: Chronic | ICD-10-CM

## 2021-01-25 DIAGNOSIS — Z00.00 MEDICARE ANNUAL WELLNESS VISIT, SUBSEQUENT: Primary | ICD-10-CM

## 2021-01-25 DIAGNOSIS — I10 BENIGN ESSENTIAL HTN: Chronic | ICD-10-CM

## 2021-01-25 PROCEDURE — G0439 PPPS, SUBSEQ VISIT: HCPCS | Performed by: FAMILY MEDICINE

## 2021-01-25 PROCEDURE — 99214 OFFICE O/P EST MOD 30 MIN: CPT | Performed by: FAMILY MEDICINE

## 2021-01-25 RX ORDER — DIAZEPAM 2 MG/1
2 TABLET ORAL EVERY 12 HOURS PRN
Qty: 60 TABLET | Refills: 2 | Status: SHIPPED | OUTPATIENT
Start: 2021-01-25 | End: 2021-04-27 | Stop reason: SDUPTHER

## 2021-01-25 RX ORDER — NORTRIPTYLINE HYDROCHLORIDE 10 MG/1
10 CAPSULE ORAL
COMMUNITY
Start: 2020-12-10 | End: 2021-01-25

## 2021-01-25 NOTE — PROGRESS NOTES
The ABCs of the Annual Wellness Visit  Subsequent Medicare Wellness Visit    Chief Complaint   Patient presents with   • medicare wellness     due  - [facility]   • Menieres     med refill due shemar  to discuss dosage        Subjective   History of Present Illness:  Kali Crespo is a 75 y.o. male who presents for a Subsequent Medicare Wellness Visit.    HEALTH RISK ASSESSMENT    Recent Hospitalizations:  No hospitalization(s) within the last year.    Current Medical Providers:  Patient Care Team:  Nathan Arango MD as PCP - General  Nathan Arango MD as PCP - Family Medicine  Nena Ramey APRN (Dermatology)    Smoking Status:  Social History     Tobacco Use   Smoking Status Former Smoker   • Packs/day: 0.25   • Types: Cigarettes   • Start date:    • Quit date:    • Years since quittin.1   Smokeless Tobacco Never Used   Tobacco Comment    40 years ago       Alcohol Consumption:  Social History     Substance and Sexual Activity   Alcohol Use No    Comment: seldom       Depression Screen:   PHQ-2/PHQ-9 Depression Screening 2021   Little interest or pleasure in doing things 0   Feeling down, depressed, or hopeless 0   Trouble falling or staying asleep, or sleeping too much -   Feeling tired or having little energy -   Poor appetite or overeating -   Feeling bad about yourself - or that you are a failure or have let yourself or your family down -   Trouble concentrating on things, such as reading the newspaper or watching television -   Moving or speaking so slowly that other people could have noticed. Or the opposite - being so fidgety or restless that you have been moving around a lot more than usual -   Thoughts that you would be better off dead, or of hurting yourself in some way -   Total Score 0   If you checked off any problems, how difficult have these problems made it for you to do your work, take care of things at home, or get along with other people? -       Fall Risk  Screen:  SHEILA Fall Risk Assessment was completed, and patient is at LOW risk for falls.Assessment completed on:1/25/2021    Health Habits and Functional and Cognitive Screening:  Functional & Cognitive Status 1/25/2021   Do you have difficulty preparing food and eating? No   Do you have difficulty bathing yourself, getting dressed or grooming yourself? No   Do you have difficulty using the toilet? No   Do you have difficulty moving around from place to place? No   Do you have trouble with steps or getting out of a bed or a chair? No   Current Diet Well Balanced Diet   Dental Exam Up to date   Eye Exam Up to date   Exercise (times per week) 3 times per week   Current Exercises Include Walking   Current Exercise Activities Include -   Do you need help using the phone?  No   Are you deaf or do you have serious difficulty hearing?  No   Do you need help with transportation? No   Do you need help shopping? No   Do you need help preparing meals?  No   Do you need help with housework?  No   Do you need help with laundry? No   Do you need help taking your medications? No   Do you need help managing money? No   Do you ever drive or ride in a car without wearing a seat belt? No   Have you felt unusual stress, anger or loneliness in the last month? No   Who do you live with? Spouse   If you need help, do you have trouble finding someone available to you? Yes   Have you been bothered in the last four weeks by sexual problems? No   Do you have difficulty concentrating, remembering or making decisions? No         Does the patient have evidence of cognitive impairment? No    Asprin use counseling:Does not need ASA (and currently is not on it)    Age-appropriate Screening Schedule:  Refer to the list below for future screening recommendations based on patient's age, sex and/or medical conditions. Orders for these recommended tests are listed in the plan section. The patient has been provided with a written plan.    Health  Maintenance   Topic Date Due   • PROSTATE CANCER SCREENING  03/11/2020   • LIPID PANEL  10/02/2021   • COLONOSCOPY  05/28/2025   • INFLUENZA VACCINE  Completed   • ZOSTER VACCINE  Completed   • TDAP/TD VACCINES  Discontinued          The following portions of the patient's history were reviewed and updated as appropriate: allergies, current medications, past family history, past medical history, past social history, past surgical history and problem list.    Outpatient Medications Prior to Visit   Medication Sig Dispense Refill   • nortriptyline (PAMELOR) 10 MG capsule Take 10 mg by mouth.     • amLODIPine (NORVASC) 5 MG tablet Take 1 tablet by mouth Daily. 90 tablet 1   • fluticasone (FLONASE) 50 MCG/ACT nasal spray 2 sprays into the nostril(s) as directed by provider Daily. 3 bottle 3   • HYDROcodone-acetaminophen (NORCO) 7.5-325 MG per tablet TK 1 T PO BID UTD     • irbesartan (AVAPRO) 300 MG tablet Take 1 tablet by mouth Daily. 90 tablet 1   • levothyroxine (SYNTHROID, LEVOTHROID) 25 MCG tablet Take 1 tablet by mouth Daily. 90 tablet 1   • loratadine (CLARITIN) 10 MG tablet Take 10 mg by mouth daily.     • Multiple Vitamins-Minerals (CENTRAL-KEVON FOR SENIORS PO) Take by mouth.     • mupirocin (BACTROBAN) 2 % cream Apply  topically to the appropriate area as directed 3 (Three) Times a Day. 15 g 0   • Omega-3 Fatty Acids (FISH OIL) 1000 MG capsule capsule Take 1,000 mg by mouth daily.     • omeprazole (priLOSEC) 20 MG capsule Take 1 capsule by mouth Daily. 90 capsule 1   • sildenafil (REVATIO) 20 MG tablet Take 2-3 tabs QD prn 60 tablet 11   • simvastatin (ZOCOR) 20 MG tablet Take 1 tablet by mouth Every Evening. 90 tablet 1   • Thiamine HCl (VITAMIN B-1) 100 MG tablet Take 500 mg by mouth daily.     • diazePAM (VALIUM) 5 MG tablet Take 1 tablet by mouth Every 12 (Twelve) Hours As Needed (vertigo). 60 tablet 2   • nortriptyline (PAMELOR) 75 MG capsule        No facility-administered medications prior to visit.   "      Patient Active Problem List   Diagnosis   • Cervical pain   • Migraine without aura and without status migrainosus, not intractable   • Vertigo   • Benign essential HTN   • Acid reflux   • Encounter for pre-operative cardiovascular clearance   • Abnormal EKG   • Hyperlipidemia   • Erectile dysfunction   • Prostate cancer (CMS/HCC)   • Meniere disease, bilateral   • Lumbar degenerative disc disease   • Low back pain   • Malignant neoplastic disease (CMS/HCC)   • Alternating esotropia   • Diplopia       Advanced Care Planning:  ACP discussion was held with the patient during this visit. Patient has an advance directive in EMR which is still valid.     Review of Systems    Compared to one year ago, the patient feels his physical health is the same.  Compared to one year ago, the patient feels his mental health is the same.    Reviewed chart for potential of high risk medication in the elderly: yes  Reviewed chart for potential of harmful drug interactions in the elderly:yes    Objective         Vitals:    01/25/21 1446   BP: 140/82   Pulse: 74   Resp: 16   Temp: 97.5 °F (36.4 °C)   TempSrc: Oral   Weight: 93.4 kg (206 lb)   Height: 177.8 cm (70\")   PainSc: 0-No pain       Body mass index is 29.56 kg/m².  Discussed the patient's BMI with him. The BMI is in the acceptable range.    Physical Exam          Assessment/Plan   Medicare Risks and Personalized Health Plan  CMS Preventative Services Quick Reference  Cardiovascular risk    The above risks/problems have been discussed with the patient.  Pertinent information has been shared with the patient in the After Visit Summary.  Follow up plans and orders are seen below in the Assessment/Plan Section.    Diagnoses and all orders for this visit:    1. Medicare annual wellness visit, subsequent (Primary)    2. Meniere disease, bilateral  -     diazePAM (VALIUM) 2 MG tablet; Take 1 tablet by mouth Every 12 (Twelve) Hours As Needed (vertigo).  Dispense: 60 tablet; Refill: " 2    3. Benign essential HTN    Other orders  -     Cancel: diazePAM (VALIUM) 5 MG tablet; Take 1 tablet by mouth Every 12 (Twelve) Hours As Needed (vertigo).  Dispense: 60 tablet; Refill: 2      Follow Up:  Return in about 3 months (around 4/25/2021) for Recheck.     An After Visit Summary and PPPS were given to the patient.

## 2021-01-25 NOTE — PATIENT INSTRUCTIONS
Medicare Wellness  Personal Prevention Plan of Service     Date of Office Visit:  2021  Encounter Provider:  Nathan Arango MD  Place of Service:  [facility]  Patient Name: Kali Crespo  :  1945    As part of the Medicare Wellness portion of your visit today, we are providing you with this personalized preventive plan of services (PPPS). This plan is based upon recommendations of the [place] Preventive Services Task Force (USPSTF) and the Advisory Committee on Immunization Practices (ACIP).    This lists the preventive care services that should be considered, and provides dates of when you are due. Items listed as completed are up-to-date and do not require any further intervention.    Health Maintenance   Topic Date Due   • AAA SCREEN (ONE-TIME)  2020   • PROSTATE CANCER SCREENING  2020   • LIPID PANEL  10/02/2021   • ANNUAL WELLNESS VISIT  2022   • COLONOSCOPY  2025   • INFLUENZA VACCINE  Completed   • Pneumococcal Vaccine 65+  Completed   • ZOSTER VACCINE  Completed   • MENINGOCOCCAL VACCINE  Aged Out   • HEPATITIS C SCREENING  Discontinued   • COLOGUARD  Discontinued   • TDAP/TD VACCINES  Discontinued       No orders of the defined types were placed in this encounter.      Return in about 3 months (around 2021) for Recheck.

## 2021-03-13 DIAGNOSIS — E03.9 ACQUIRED HYPOTHYROIDISM: ICD-10-CM

## 2021-03-13 DIAGNOSIS — E78.2 MIXED HYPERLIPIDEMIA: ICD-10-CM

## 2021-03-13 DIAGNOSIS — I10 BENIGN ESSENTIAL HTN: ICD-10-CM

## 2021-03-13 DIAGNOSIS — K21.9 GASTROESOPHAGEAL REFLUX DISEASE WITHOUT ESOPHAGITIS: ICD-10-CM

## 2021-03-15 RX ORDER — OMEPRAZOLE 20 MG/1
20 CAPSULE, DELAYED RELEASE ORAL DAILY
Qty: 90 CAPSULE | Refills: 0 | OUTPATIENT
Start: 2021-03-15

## 2021-03-15 RX ORDER — IRBESARTAN 300 MG/1
300 TABLET ORAL DAILY
Qty: 90 TABLET | Refills: 0 | OUTPATIENT
Start: 2021-03-15

## 2021-03-15 RX ORDER — LEVOTHYROXINE SODIUM 0.03 MG/1
25 TABLET ORAL DAILY
Qty: 90 TABLET | Refills: 0 | OUTPATIENT
Start: 2021-03-15

## 2021-03-15 RX ORDER — SIMVASTATIN 20 MG
TABLET ORAL
Qty: 90 TABLET | Refills: 0 | OUTPATIENT
Start: 2021-03-15

## 2021-03-15 RX ORDER — AMLODIPINE BESYLATE 5 MG/1
5 TABLET ORAL DAILY
Qty: 90 TABLET | Refills: 0 | OUTPATIENT
Start: 2021-03-15

## 2021-04-12 ENCOUNTER — TELEPHONE (OUTPATIENT)
Dept: FAMILY MEDICINE CLINIC | Facility: CLINIC | Age: 76
End: 2021-04-12

## 2021-04-12 DIAGNOSIS — E03.9 HYPOTHYROIDISM, UNSPECIFIED TYPE: ICD-10-CM

## 2021-04-12 DIAGNOSIS — Z12.5 SCREENING FOR PROSTATE CANCER: ICD-10-CM

## 2021-04-12 DIAGNOSIS — I10 BENIGN ESSENTIAL HTN: Primary | ICD-10-CM

## 2021-04-12 NOTE — TELEPHONE ENCOUNTER
Caller: No Crespo    Relationship: Emergency Contact    Best call back number:[phone]    What orders are you requesting (i.e. lab or imaging): LABS    In what timeframe would the patient need to come in: TODAY    Where will you receive your lab/imaging services: [facility] IN [place]  Additional notes:  PLEASE ADVISE, CALL WHEN READY

## 2021-04-16 LAB
ALBUMIN SERPL-MCNC: 4.3 G/DL (ref 3.5–5.2)
ALBUMIN/GLOB SERPL: 2 G/DL
ALP SERPL-CCNC: 114 U/L (ref 39–117)
ALT SERPL-CCNC: 21 U/L (ref 1–41)
AST SERPL-CCNC: 16 U/L (ref 1–40)
BASOPHILS # BLD AUTO: 0.04 10*3/MM3 (ref 0–0.2)
BASOPHILS NFR BLD AUTO: 0.6 % (ref 0–1.5)
BILIRUB SERPL-MCNC: 0.5 MG/DL (ref 0–1.2)
BUN SERPL-MCNC: 22 MG/DL (ref 8–23)
BUN/CREAT SERPL: 22 (ref 7–25)
CALCIUM SERPL-MCNC: 9.6 MG/DL (ref 8.6–10.5)
CHLORIDE SERPL-SCNC: 106 MMOL/L (ref 98–107)
CHOLEST SERPL-MCNC: 225 MG/DL (ref 0–200)
CO2 SERPL-SCNC: 28.3 MMOL/L (ref 22–29)
CREAT SERPL-MCNC: 1 MG/DL (ref 0.76–1.27)
EOSINOPHIL # BLD AUTO: 0.18 10*3/MM3 (ref 0–0.4)
EOSINOPHIL NFR BLD AUTO: 2.7 % (ref 0.3–6.2)
ERYTHROCYTE [DISTWIDTH] IN BLOOD BY AUTOMATED COUNT: 13.6 % (ref 12.3–15.4)
GLOBULIN SER CALC-MCNC: 2.1 GM/DL
GLUCOSE SERPL-MCNC: 99 MG/DL (ref 65–99)
HCT VFR BLD AUTO: 47.1 % (ref 37.5–51)
HDLC SERPL-MCNC: 53 MG/DL (ref 40–60)
HGB BLD-MCNC: 16.2 G/DL (ref 13–17.7)
IMM GRANULOCYTES # BLD AUTO: 0.02 10*3/MM3 (ref 0–0.05)
IMM GRANULOCYTES NFR BLD AUTO: 0.3 % (ref 0–0.5)
LDLC SERPL CALC-MCNC: 160 MG/DL (ref 0–100)
LYMPHOCYTES # BLD AUTO: 1.2 10*3/MM3 (ref 0.7–3.1)
LYMPHOCYTES NFR BLD AUTO: 18.3 % (ref 19.6–45.3)
MCH RBC QN AUTO: 31.7 PG (ref 26.6–33)
MCHC RBC AUTO-ENTMCNC: 34.4 G/DL (ref 31.5–35.7)
MCV RBC AUTO: 92.2 FL (ref 79–97)
MONOCYTES # BLD AUTO: 0.29 10*3/MM3 (ref 0.1–0.9)
MONOCYTES NFR BLD AUTO: 4.4 % (ref 5–12)
NEUTROPHILS # BLD AUTO: 4.82 10*3/MM3 (ref 1.7–7)
NEUTROPHILS NFR BLD AUTO: 73.7 % (ref 42.7–76)
NRBC BLD AUTO-RTO: 0 /100 WBC (ref 0–0.2)
PLATELET # BLD AUTO: 253 10*3/MM3 (ref 140–450)
POTASSIUM SERPL-SCNC: 4.5 MMOL/L (ref 3.5–5.2)
PROT SERPL-MCNC: 6.4 G/DL (ref 6–8.5)
PSA SERPL-MCNC: <0.014 NG/ML (ref 0–4)
RBC # BLD AUTO: 5.11 10*6/MM3 (ref 4.14–5.8)
SODIUM SERPL-SCNC: 142 MMOL/L (ref 136–145)
T4 FREE SERPL-MCNC: 1.35 NG/DL (ref 0.93–1.7)
TRIGL SERPL-MCNC: 70 MG/DL (ref 0–150)
TSH SERPL DL<=0.005 MIU/L-ACNC: 2.69 UIU/ML (ref 0.27–4.2)
VLDLC SERPL CALC-MCNC: 12 MG/DL (ref 5–40)
WBC # BLD AUTO: 6.55 10*3/MM3 (ref 3.4–10.8)

## 2021-04-25 DIAGNOSIS — H81.03 MENIERE DISEASE, BILATERAL: Chronic | ICD-10-CM

## 2021-04-26 RX ORDER — DIAZEPAM 2 MG/1
TABLET ORAL
Qty: 60 TABLET | OUTPATIENT
Start: 2021-04-26

## 2021-04-27 RX ORDER — OMEPRAZOLE 20 MG/1
20 CAPSULE, DELAYED RELEASE ORAL DAILY
Qty: 90 CAPSULE | Refills: 1 | Status: CANCELLED | OUTPATIENT
Start: 2021-04-27

## 2021-04-27 RX ORDER — SILDENAFIL CITRATE 20 MG/1
TABLET ORAL
Qty: 60 TABLET | Refills: 11 | Status: CANCELLED | OUTPATIENT
Start: 2021-04-27

## 2021-04-28 ENCOUNTER — OFFICE VISIT (OUTPATIENT)
Dept: FAMILY MEDICINE CLINIC | Facility: CLINIC | Age: 76
End: 2021-04-28

## 2021-04-28 VITALS
HEIGHT: 70 IN | BODY MASS INDEX: 27.49 KG/M2 | TEMPERATURE: 97 F | WEIGHT: 192 LBS | OXYGEN SATURATION: 97 % | RESPIRATION RATE: 16 BRPM | HEART RATE: 72 BPM | DIASTOLIC BLOOD PRESSURE: 75 MMHG | SYSTOLIC BLOOD PRESSURE: 118 MMHG

## 2021-04-28 DIAGNOSIS — E78.2 MIXED HYPERLIPIDEMIA: ICD-10-CM

## 2021-04-28 DIAGNOSIS — E03.9 ACQUIRED HYPOTHYROIDISM: ICD-10-CM

## 2021-04-28 DIAGNOSIS — J30.1 CHRONIC SEASONAL ALLERGIC RHINITIS DUE TO POLLEN: ICD-10-CM

## 2021-04-28 DIAGNOSIS — I10 BENIGN ESSENTIAL HTN: ICD-10-CM

## 2021-04-28 DIAGNOSIS — K21.9 GASTROESOPHAGEAL REFLUX DISEASE WITHOUT ESOPHAGITIS: ICD-10-CM

## 2021-04-28 DIAGNOSIS — H81.03 MENIERE DISEASE, BILATERAL: Chronic | ICD-10-CM

## 2021-04-28 PROCEDURE — 99214 OFFICE O/P EST MOD 30 MIN: CPT | Performed by: FAMILY MEDICINE

## 2021-04-28 RX ORDER — DIAZEPAM 2 MG/1
2 TABLET ORAL EVERY 12 HOURS PRN
Qty: 60 TABLET | Refills: 2 | Status: SHIPPED | OUTPATIENT
Start: 2021-04-28 | End: 2021-08-08 | Stop reason: SDUPTHER

## 2021-04-28 RX ORDER — IRBESARTAN 300 MG/1
300 TABLET ORAL DAILY
Qty: 90 TABLET | Refills: 1 | Status: SHIPPED | OUTPATIENT
Start: 2021-04-28 | End: 2021-09-10

## 2021-04-28 RX ORDER — FLUTICASONE PROPIONATE 50 MCG
2 SPRAY, SUSPENSION (ML) NASAL DAILY
Qty: 48 G | Refills: 1 | Status: SHIPPED | OUTPATIENT
Start: 2021-04-28 | End: 2021-11-09 | Stop reason: SDUPTHER

## 2021-04-28 RX ORDER — SIMVASTATIN 20 MG
20 TABLET ORAL EVERY EVENING
Qty: 90 TABLET | Refills: 1 | Status: SHIPPED | OUTPATIENT
Start: 2021-04-28 | End: 2021-09-10

## 2021-04-28 RX ORDER — PANTOPRAZOLE SODIUM 40 MG/1
40 TABLET, DELAYED RELEASE ORAL DAILY
Qty: 90 TABLET | Refills: 1 | Status: SHIPPED | OUTPATIENT
Start: 2021-04-28 | End: 2021-09-10

## 2021-04-28 RX ORDER — AMLODIPINE BESYLATE 5 MG/1
5 TABLET ORAL DAILY
Qty: 90 TABLET | Refills: 1 | Status: SHIPPED | OUTPATIENT
Start: 2021-04-28 | End: 2021-09-10

## 2021-04-28 RX ORDER — LEVOTHYROXINE SODIUM 0.03 MG/1
25 TABLET ORAL DAILY
Qty: 90 TABLET | Refills: 1 | Status: SHIPPED | OUTPATIENT
Start: 2021-04-28 | End: 2021-09-10

## 2021-04-28 NOTE — PROGRESS NOTES
Subjective   Kali Crespo is a 75 y.o. male.     History of Present Illness     Chief Complaint:   Chief Complaint   Patient presents with   • Hypertension     med refills       Kali Crespo 75 y.o. male who presents today for Medical Management of the below listed issues and medication refills.  he has a problem list of   Patient Active Problem List   Diagnosis   • Cervical pain   • Migraine without aura and without status migrainosus, not intractable   • Vertigo   • Benign essential HTN   • Acid reflux   • Encounter for pre-operative cardiovascular clearance   • Abnormal EKG   • Hyperlipidemia   • Erectile dysfunction   • Prostate cancer (CMS/HCC)   • Meniere disease, bilateral   • Lumbar degenerative disc disease   • Low back pain   • Malignant neoplastic disease (CMS/HCC)   • Alternating esotropia   • Diplopia   .  Since the last visit, he has overall felt well.  he has been compliant with   Current Outpatient Medications:   •  HYDROcodone-acetaminophen (NORCO) 7.5-325 MG per tablet, TK 1 T PO BID UTD, Disp: , Rfl:   •  loratadine (CLARITIN) 10 MG tablet, Take 10 mg by mouth daily., Disp: , Rfl:   •  Multiple Vitamins-Minerals (CENTRAL-KEVON FOR SENIORS PO), Take by mouth., Disp: , Rfl:   •  mupirocin (BACTROBAN) 2 % cream, Apply  topically to the appropriate area as directed 3 (Three) Times a Day., Disp: 15 g, Rfl: 0  •  Omega-3 Fatty Acids (FISH OIL) 1000 MG capsule capsule, Take 1,000 mg by mouth daily., Disp: , Rfl:   •  sildenafil (REVATIO) 20 MG tablet, Take 2-3 tabs QD prn, Disp: 60 tablet, Rfl: 11  •  Thiamine HCl (VITAMIN B-1) 100 MG tablet, Take 500 mg by mouth daily., Disp: , Rfl:   •  amLODIPine (NORVASC) 5 MG tablet, Take 1 tablet by mouth Daily., Disp: 90 tablet, Rfl: 1  •  diazePAM (VALIUM) 2 MG tablet, Take 1 tablet by mouth Every 12 (Twelve) Hours As Needed (vertigo)., Disp: 60 tablet, Rfl: 2  •  fluticasone (FLONASE) 50 MCG/ACT nasal spray, 2 sprays into the nostril(s) as directed by  "provider Daily., Disp: 48 g, Rfl: 1  •  irbesartan (AVAPRO) 300 MG tablet, Take 1 tablet by mouth Daily., Disp: 90 tablet, Rfl: 1  •  levothyroxine (SYNTHROID, LEVOTHROID) 25 MCG tablet, Take 1 tablet by mouth Daily., Disp: 90 tablet, Rfl: 1  •  pantoprazole (PROTONIX) 40 MG EC tablet, Take 1 tablet by mouth Daily., Disp: 90 tablet, Rfl: 1  •  simvastatin (ZOCOR) 20 MG tablet, Take 1 tablet by mouth Every Evening., Disp: 90 tablet, Rfl: 1.  he denies medication side effects.    All of the other chronic condition(s) listed above are stable w/o issues.    /75   Pulse 72   Temp 97 °F (36.1 °C) (Skin)   Resp 16   Ht 177.8 cm (70\")   Wt 87.1 kg (192 lb)   SpO2 97%   BMI 27.55 kg/m²     Results for orders placed or performed in visit on 04/12/21   Comprehensive metabolic panel    Specimen: Blood   Result Value Ref Range    Glucose 99 65 - 99 mg/dL    BUN 22 8 - 23 mg/dL    Creatinine 1.00 0.76 - 1.27 mg/dL    eGFR Non African Am 73 >60 mL/min/1.73    eGFR African Am 88 >60 mL/min/1.73    BUN/Creatinine Ratio 22.0 7.0 - 25.0    Sodium 142 136 - 145 mmol/L    Potassium 4.5 3.5 - 5.2 mmol/L    Chloride 106 98 - 107 mmol/L    Total CO2 28.3 22.0 - 29.0 mmol/L    Calcium 9.6 8.6 - 10.5 mg/dL    Total Protein 6.4 6.0 - 8.5 g/dL    Albumin 4.30 3.50 - 5.20 g/dL    Globulin 2.1 gm/dL    A/G Ratio 2.0 g/dL    Total Bilirubin 0.5 0.0 - 1.2 mg/dL    Alkaline Phosphatase 114 39 - 117 U/L    AST (SGOT) 16 1 - 40 U/L    ALT (SGPT) 21 1 - 41 U/L   Lipid panel    Specimen: Blood   Result Value Ref Range    Total Cholesterol 225 (H) 0 - 200 mg/dL    Triglycerides 70 0 - 150 mg/dL    HDL Cholesterol 53 40 - 60 mg/dL    VLDL Cholesterol Farrukh 12 5 - 40 mg/dL    LDL Chol Calc (NIH) 160 (H) 0 - 100 mg/dL   TSH    Specimen: Blood   Result Value Ref Range    TSH 2.690 0.270 - 4.200 uIU/mL   PSA    Specimen: Blood   Result Value Ref Range    PSA <0.014 0.000 - 4.000 ng/mL   T4, Free    Specimen: Blood   Result Value Ref Range    Free " T4 1.35 0.93 - 1.70 ng/dL   CBC and Differential    Specimen: Blood   Result Value Ref Range    WBC 6.55 3.40 - 10.80 10*3/mm3    RBC 5.11 4.14 - 5.80 10*6/mm3    Hemoglobin 16.2 13.0 - 17.7 g/dL    Hematocrit 47.1 37.5 - 51.0 %    MCV 92.2 79.0 - 97.0 fL    MCH 31.7 26.6 - 33.0 pg    MCHC 34.4 31.5 - 35.7 g/dL    RDW 13.6 12.3 - 15.4 %    Platelets 253 140 - 450 10*3/mm3    Neutrophil Rel % 73.7 42.7 - 76.0 %    Lymphocyte Rel % 18.3 (L) 19.6 - 45.3 %    Monocyte Rel % 4.4 (L) 5.0 - 12.0 %    Eosinophil Rel % 2.7 0.3 - 6.2 %    Basophil Rel % 0.6 0.0 - 1.5 %    Neutrophils Absolute 4.82 1.70 - 7.00 10*3/mm3    Lymphocytes Absolute 1.20 0.70 - 3.10 10*3/mm3    Monocytes Absolute 0.29 0.10 - 0.90 10*3/mm3    Eosinophils Absolute 0.18 0.00 - 0.40 10*3/mm3    Basophils Absolute 0.04 0.00 - 0.20 10*3/mm3    Immature Granulocyte Rel % 0.3 0.0 - 0.5 %    Immature Grans Absolute 0.02 0.00 - 0.05 10*3/mm3    nRBC 0.0 0.0 - 0.2 /100 WBC           The following portions of the patient's history were reviewed and updated as appropriate: allergies, current medications, past family history, past medical history, past social history, past surgical history and problem list.    Review of Systems   Constitutional: Negative for activity change, chills and fever.   Respiratory: Negative for cough.    Cardiovascular: Negative for chest pain.   Gastrointestinal: Positive for abdominal pain (epigastrium, worse x 2 months).   Psychiatric/Behavioral: Negative for dysphoric mood.       Objective   Physical Exam  Constitutional:       General: He is not in acute distress.     Appearance: He is well-developed.   Cardiovascular:      Rate and Rhythm: Normal rate and regular rhythm.   Pulmonary:      Effort: Pulmonary effort is normal.      Breath sounds: Normal breath sounds.   Neurological:      Mental Status: He is alert and oriented to person, place, and time.   Psychiatric:         Behavior: Behavior normal.         Thought Content:  Thought content normal.     Labs reviewed with pt today during visit. All questions answered.      Assessment/Plan   Diagnoses and all orders for this visit:    1. Benign essential HTN  -     irbesartan (AVAPRO) 300 MG tablet; Take 1 tablet by mouth Daily.  Dispense: 90 tablet; Refill: 1  -     amLODIPine (NORVASC) 5 MG tablet; Take 1 tablet by mouth Daily.  Dispense: 90 tablet; Refill: 1    2. Meniere disease, bilateral  -     diazePAM (VALIUM) 2 MG tablet; Take 1 tablet by mouth Every 12 (Twelve) Hours As Needed (vertigo).  Dispense: 60 tablet; Refill: 2    3. Chronic seasonal allergic rhinitis due to pollen  -     fluticasone (FLONASE) 50 MCG/ACT nasal spray; 2 sprays into the nostril(s) as directed by provider Daily.  Dispense: 48 g; Refill: 1    4. Acquired hypothyroidism  -     levothyroxine (SYNTHROID, LEVOTHROID) 25 MCG tablet; Take 1 tablet by mouth Daily.  Dispense: 90 tablet; Refill: 1    5. Gastroesophageal reflux disease without esophagitis  Comments:  worsening  Orders:  -     pantoprazole (PROTONIX) 40 MG EC tablet; Take 1 tablet by mouth Daily.  Dispense: 90 tablet; Refill: 1    6. Mixed hyperlipidemia  -     simvastatin (ZOCOR) 20 MG tablet; Take 1 tablet by mouth Every Evening.  Dispense: 90 tablet; Refill: 1    Other orders  -     Cancel: omeprazole (priLOSEC) 20 MG capsule; Take 1 capsule by mouth Daily.  Dispense: 90 capsule; Refill: 1  -     Cancel: sildenafil (REVATIO) 20 MG tablet; Take 2-3 tabs QD prn  Dispense: 60 tablet; Refill: 11

## 2021-04-29 ENCOUNTER — TELEPHONE (OUTPATIENT)
Dept: FAMILY MEDICINE CLINIC | Facility: CLINIC | Age: 76
End: 2021-04-29

## 2021-04-29 DIAGNOSIS — I10 BENIGN ESSENTIAL HTN: Primary | ICD-10-CM

## 2021-06-03 ENCOUNTER — HOSPITAL ENCOUNTER (EMERGENCY)
Facility: HOSPITAL | Age: 76
Discharge: HOME | End: 2021-06-03
Attending: EMERGENCY MEDICINE | Admitting: EMERGENCY MEDICINE

## 2021-06-03 VITALS
OXYGEN SATURATION: 98 % | WEIGHT: 185 LBS | HEIGHT: 70 IN | SYSTOLIC BLOOD PRESSURE: 145 MMHG | DIASTOLIC BLOOD PRESSURE: 79 MMHG | TEMPERATURE: 97.4 F | HEART RATE: 67 BPM | RESPIRATION RATE: 16 BRPM | BODY MASS INDEX: 26.48 KG/M2

## 2021-06-03 DIAGNOSIS — S61.209A EXTENSOR TENDON LACERATION, FINGER, OPEN WOUND, INITIAL ENCOUNTER: Primary | ICD-10-CM

## 2021-06-03 DIAGNOSIS — S56.429A EXTENSOR TENDON LACERATION, FINGER, OPEN WOUND, INITIAL ENCOUNTER: Primary | ICD-10-CM

## 2021-06-03 PROCEDURE — 99282 EMERGENCY DEPT VISIT SF MDM: CPT

## 2021-06-03 PROCEDURE — 90715 TDAP VACCINE 7 YRS/> IM: CPT | Performed by: PHYSICIAN ASSISTANT

## 2021-06-03 PROCEDURE — 25010000002 TDAP 5-2.5-18.5 LF-MCG/0.5 SUSPENSION: Performed by: PHYSICIAN ASSISTANT

## 2021-06-03 PROCEDURE — 90471 IMMUNIZATION ADMIN: CPT | Performed by: PHYSICIAN ASSISTANT

## 2021-06-03 RX ORDER — CEPHALEXIN 500 MG/1
500 CAPSULE ORAL 3 TIMES DAILY
Qty: 30 CAPSULE | Refills: 0 | Status: SHIPPED | OUTPATIENT
Start: 2021-06-03 | End: 2021-08-09

## 2021-06-03 RX ORDER — LIDOCAINE HYDROCHLORIDE AND EPINEPHRINE 10; 10 MG/ML; UG/ML
10 INJECTION, SOLUTION INFILTRATION; PERINEURAL ONCE
Status: COMPLETED | OUTPATIENT
Start: 2021-06-03 | End: 2021-06-03

## 2021-06-03 RX ADMIN — TETANUS TOXOID, REDUCED DIPHTHERIA TOXOID AND ACELLULAR PERTUSSIS VACCINE, ADSORBED 0.5 ML: 5; 2.5; 8; 8; 2.5 SUSPENSION INTRAMUSCULAR at 19:37

## 2021-06-03 RX ADMIN — LIDOCAINE HYDROCHLORIDE,EPINEPHRINE BITARTRATE 10 ML: 10; .01 INJECTION, SOLUTION INFILTRATION; PERINEURAL at 18:58

## 2021-06-03 NOTE — ED PROVIDER NOTES
EMERGENCY DEPARTMENT ENCOUNTER    Room Number:  17/17  Date of encounter:  6/3/2021  PCP: Nathan Arango MD  Historian: Patient, spouse      I used full protective equipment while examining this patient.  This includes face mask, gloves and protective eyewear.  I washed my hands before entering the room and immediately upon leaving the room      HPI:  Chief Complaint: Left hand laceration  A complete HPI/ROS/PMH/PSH/SH/FH are unobtainable due to: Nothing    Context: Kali Crespo is a 75 y.o. male who presents to the ED c/o left hand laceration.  Patient states he accidentally cut his left hand just prior to arrival on a glass bowl at home.  The laceration is localized to the dorsal surface of the fourth MCP joint.  Patient states immediately he was unable to extend his left ring finger.  He states the remaining fingers and hands are normal.  He denies any numbness or tingling to his fourth finger.  He is unsure of his last tetanus shot.  He is right-handed.    Review of Medical Records  I reviewed patient's last office visit with his family medicine doctor from 4/20/2021.  Patient being treated for hypertension, hypothyroidism, GERD.    PAST MEDICAL HISTORY  Active Ambulatory Problems     Diagnosis Date Noted   • Cervical pain 01/25/2016   • Migraine without aura and without status migrainosus, not intractable 01/25/2016   • Vertigo 01/25/2016   • Benign essential HTN 01/25/2016   • Acid reflux 01/25/2016   • Encounter for pre-operative cardiovascular clearance 01/25/2016   • Abnormal EKG 01/25/2016   • Hyperlipidemia 04/11/2016   • Erectile dysfunction 10/11/2016   • Prostate cancer (CMS/HCC) 10/11/2016   • Meniere disease, bilateral 11/27/2017   • Lumbar degenerative disc disease 04/30/2019   • Low back pain 10/10/2019   • Malignant neoplastic disease (CMS/[facility]) 10/10/2019   • 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   • Diplopia 01/13/2016     Resolved Ambulatory Problems     Diagnosis Date Noted   • No Resolved  Ambulatory Problems     Past Medical History:   Diagnosis Date   • Anxiety    • DDD (degenerative disc disease), cervical    • DDD (degenerative disc disease), lumbosacral    • GERD (gastroesophageal reflux disease)    • H/O complete eye exam 2017   • Headache    • Hypertension    • Hyperthyroidism    • Measles    • Meniere disease    • Mumps          PAST SURGICAL HISTORY  Past Surgical History:   Procedure Laterality Date   • CATARACT EXTRACTION     • EYE SURGERY     • KNEE SURGERY     • PROSTATE SURGERY     • SINUS SURGERY           FAMILY HISTORY  Family History   Problem Relation Age of Onset   • Heart disease Mother    • Stroke Mother    • Cancer Father         prostate   • Migraines Sister          SOCIAL HISTORY  Social History     Socioeconomic History   • Marital status:      Spouse name: Not on file   • Number of children: Not on file   • Years of education: Not on file   • Highest education level: Not on file   Tobacco Use   • Smoking status: Former Smoker     Packs/day: 0.25     Types: Cigarettes     Start date:      Quit date:      Years since quittin.4   • Smokeless tobacco: Never Used   • Tobacco comment: 40 years ago   Substance and Sexual Activity   • Alcohol use: No     Comment: seldom   • Drug use: No   • Sexual activity: Defer         ALLERGIES  Amoxicillin, Astemizole, Codeine sulfate, Penicillins, Phenylephrine, Pyrilamine, and Sulfa antibiotics        REVIEW OF SYSTEMS  All systems reviewed and negative except for those discussed in HPI.       PHYSICAL EXAM    I have reviewed the triage vital signs and nursing notes.    ED Triage Vitals   Temp Heart Rate Resp BP SpO2   21 1830 21 1830 21 1830 21 18421 1830   97.4 °F (36.3 °C) 70 15 159/99 99 %      Temp src Heart Rate Source Patient Position BP Location FiO2 (%)   21 1830 21 1830 21 1841 21 184 --   Tympanic Monitor Sitting Right arm        Physical Exam  GENERAL:  Alert, oriented not distressed  HENT: head atraumatic, no nuchal rigidity  EYES: no scleral icterus, EOMI  CV: regular rhythm, regular rate, no murmur  RESPIRATORY: normal effort, CTA  MUSCULOSKELETAL: Laceration to dorsal surface of left fourth MCP joint.  Fourth finger held in flexion.  He is unable to extend ring finger at all.  Sensation intact distally.  NEURO: alert, follows commands  SKIN: warm, dry    Laceration Repair    Date/Time: 6/3/2021 7:23 PM  Performed by: Justin Denton PA  Authorized by: Arron Pizarro MD     Consent:     Consent obtained:  Verbal    Consent given by:  Patient  Anesthesia (see MAR for exact dosages):     Anesthesia method:  Local infiltration    Local anesthetic:  Lidocaine 1% WITH epi  Laceration details:     Location:  Hand    Hand location:  L hand, dorsum    Length (cm):  1.7  Repair type:     Repair type:  Simple  Pre-procedure details:     Preparation:  Patient was prepped and draped in usual sterile fashion  Exploration:     Hemostasis achieved with:  Epinephrine    Wound exploration: wound explored through full range of motion and entire depth of wound probed and visualized      Wound extent: tendon damage      Wound extent: no nerve damage noted      Tendon damage extent:  Complete transection    Tendon repair plan:  Refer for evaluation    Contaminated: no    Treatment:     Area cleansed with:  Hibiclens    Amount of cleaning:  Extensive    Irrigation solution:  Sterile saline  Skin repair:     Repair method:  Sutures    Suture size:  5-0    Suture material:  Nylon    Suture technique:  Simple interrupted and horizontal mattress    Number of sutures:  4  Approximation:     Approximation:  Close  Post-procedure details:     Dressing:  Antibiotic ointment and splint for protection    Patient tolerance of procedure:  Tolerated well, no immediate complications          MEDICATIONS GIVEN IN ER    Medications   Tdap (BOOSTRIX) injection 0.5 mL (has no administration in time  range)   lidocaine 1% - EPINEPHrine 1:479100 (XYLOCAINE W/EPI) 1 %-1:787749 injection 10 mL (10 mL Injection Given by Other 6/3/21 1858)         PROGRESS, DATA ANALYSIS, CONSULTS, AND MEDICAL DECISION MAKING    All labs have been independently reviewed by me.  All radiology studies have been reviewed by me and discussed with radiologist dictating the report.   EKG's independently viewed and interpreted by me.  Discussion below represents my analysis of pertinent findings related to patient's condition, differential diagnosis, treatment plan and final disposition.    I have discussed case with Dr. Pizarro, emergency room physician.  He has performed his own bedside examination and agrees with treatment plan.    ED Course as of Jun 03 1924   Thu Jun 03, 2021 1842 Patient has laceration to dorsal surface of left fourth MCP joint.  Patient has extensor tendon laceration.  Neurologically intact distally.  Plan to discuss with hand surgeon and reevaluate.    [EE]   1849 I discussed case with Dr. Leonard, hand surgeon.  He would like me to wash the wound out, primarily close and have him follow-up in the office.  Patient is in agreement with this plan.    [EE]      ED Course User Index  [EE] Justin Denton PA       AS OF 19:24 EDT VITALS:    BP - 159/99  HR - 70  TEMP - 97.4 °F (36.3 °C) (Tympanic)  O2 SATS - 99%        DIAGNOSIS  Final diagnoses:   Extensor tendon laceration, finger, open wound, initial encounter         DISPOSITION  Discharged           Justin Denton PA  06/03/21 1924

## 2021-06-03 NOTE — DISCHARGE INSTRUCTIONS
You have torn the extensor tendon of your left fourth finger. This needs to be repaired by a hand surgeon. Call Dr. Leonard to schedule an appointment for as soon as possible.

## 2021-06-03 NOTE — ED PROVIDER NOTES
Pt presents to the ED c/o  laceration to the fourth MCP joint of his left hand.  He is unable to extend his ring finger.     On exam,   Awake, alert, no acute distress.  There is a laceration over the extensor surface of the MCP of the digit.  He is unable to extend the fourth finger.  Capillary refill is brisk, sensation is intact.     Plan: The medical provider has been in touch with the hand surgeon on call.  The wound will be loosely approximated.  Tetanus will be updated.  Finger will be splinted and extended position.  He will follow-up with hand surgery tomorrow.      Appropriate PPE was worn by myself and the patient throughout her entire interaction.       Attestation:  The KRISTIAN and I have discussed this patient's history, physical exam, and treatment plan.  I have reviewed the documentation and personally had a face to face interaction with the patient. I affirm the documentation and agree with the treatment and plan.  The attached note describes my personal findings.            Arron Pizarro MD  06/03/21 [id number]

## 2021-06-03 NOTE — ED NOTES
Pt presents to ED with complaints of a laceration to the L hand, between the pinky and ring finger. Pt reports bleeding is controlled, but is unable to move the ring finger. Pt denies use of blood thinners at this time. Pt is A&OX4, able to ambulate, and in a mask at this time.     Patient was placed in face mask in first look.  Patient was wearing a face mask throughout our encounter.  I wore protective eye protection throughout the encounter.  Hand hygiene was performed before and after patient encounter.        Orquidea Carty RN  06/03/21 2779

## 2021-06-10 ENCOUNTER — PRE-ADMISSION TESTING (OUTPATIENT)
Dept: PREADMISSION TESTING | Facility: HOSPITAL | Age: 76
End: 2021-06-10

## 2021-06-10 ENCOUNTER — HOSPITAL ENCOUNTER (OUTPATIENT)
Dept: GENERAL RADIOLOGY | Facility: HOSPITAL | Age: 76
Discharge: HOME | End: 2021-06-10

## 2021-06-10 VITALS
BODY MASS INDEX: 28.06 KG/M2 | DIASTOLIC BLOOD PRESSURE: 83 MMHG | HEIGHT: 70 IN | SYSTOLIC BLOOD PRESSURE: 139 MMHG | WEIGHT: 196 LBS | HEART RATE: 67 BPM | TEMPERATURE: 98.8 F | OXYGEN SATURATION: 96 % | RESPIRATION RATE: 16 BRPM

## 2021-06-10 PROBLEM — S66.324A: Status: ACTIVE | Noted: 2021-06-10

## 2021-06-10 LAB
ALBUMIN SERPL-MCNC: 4.1 G/DL (ref 3.5–5.2)
ALBUMIN/GLOB SERPL: 1.6 G/DL
ALP SERPL-CCNC: 111 U/L (ref 39–117)
ALT SERPL W P-5'-P-CCNC: 18 U/L (ref 1–41)
ANION GAP SERPL CALCULATED.3IONS-SCNC: 8.8 MMOL/L (ref 5–15)
AST SERPL-CCNC: 16 U/L (ref 1–40)
BASOPHILS # BLD AUTO: 0.06 10*3/MM3 (ref 0–0.2)
BASOPHILS NFR BLD AUTO: 0.9 % (ref 0–1.5)
BILIRUB SERPL-MCNC: 0.5 MG/DL (ref 0–1.2)
BILIRUB UR QL STRIP: NEGATIVE
BUN SERPL-MCNC: 13 MG/DL (ref 8–23)
BUN/CREAT SERPL: 14 (ref 7–25)
CALCIUM SPEC-SCNC: 9.4 MG/DL (ref 8.6–10.5)
CHLORIDE SERPL-SCNC: 103 MMOL/L (ref 98–107)
CLARITY UR: CLEAR
CO2 SERPL-SCNC: 27.2 MMOL/L (ref 22–29)
COLOR UR: YELLOW
CREAT SERPL-MCNC: 0.93 MG/DL (ref 0.76–1.27)
DEPRECATED RDW RBC AUTO: 44.2 FL (ref 37–54)
EOSINOPHIL # BLD AUTO: 0.52 10*3/MM3 (ref 0–0.4)
EOSINOPHIL NFR BLD AUTO: 8.1 % (ref 0.3–6.2)
ERYTHROCYTE [DISTWIDTH] IN BLOOD BY AUTOMATED COUNT: 13.6 % (ref 12.3–15.4)
GFR SERPL CREATININE-BSD FRML MDRD: 79 ML/MIN/1.73
GLOBULIN UR ELPH-MCNC: 2.5 GM/DL
GLUCOSE SERPL-MCNC: 99 MG/DL (ref 65–99)
GLUCOSE UR STRIP-MCNC: NEGATIVE MG/DL
HCT VFR BLD AUTO: 41.4 % (ref 37.5–51)
HGB BLD-MCNC: 14.3 G/DL (ref 13–17.7)
HGB UR QL STRIP.AUTO: NEGATIVE
IMM GRANULOCYTES # BLD AUTO: 0.02 10*3/MM3 (ref 0–0.05)
IMM GRANULOCYTES NFR BLD AUTO: 0.3 % (ref 0–0.5)
KETONES UR QL STRIP: NEGATIVE
LEUKOCYTE ESTERASE UR QL STRIP.AUTO: NEGATIVE
LYMPHOCYTES # BLD AUTO: 1.04 10*3/MM3 (ref 0.7–3.1)
LYMPHOCYTES NFR BLD AUTO: 16.3 % (ref 19.6–45.3)
MCH RBC QN AUTO: 30.8 PG (ref 26.6–33)
MCHC RBC AUTO-ENTMCNC: 34.5 G/DL (ref 31.5–35.7)
MCV RBC AUTO: 89.2 FL (ref 79–97)
MONOCYTES # BLD AUTO: 0.33 10*3/MM3 (ref 0.1–0.9)
MONOCYTES NFR BLD AUTO: 5.2 % (ref 5–12)
NEUTROPHILS NFR BLD AUTO: 4.43 10*3/MM3 (ref 1.7–7)
NEUTROPHILS NFR BLD AUTO: 69.2 % (ref 42.7–76)
NITRITE UR QL STRIP: NEGATIVE
NRBC BLD AUTO-RTO: 0 /100 WBC (ref 0–0.2)
PH UR STRIP.AUTO: 7 [PH] (ref 5–8)
PLATELET # BLD AUTO: 255 10*3/MM3 (ref 140–450)
PMV BLD AUTO: 9.3 FL (ref 6–12)
POTASSIUM SERPL-SCNC: 4 MMOL/L (ref 3.5–5.2)
PROT SERPL-MCNC: 6.6 G/DL (ref 6–8.5)
PROT UR QL STRIP: NEGATIVE
QT INTERVAL: 376 MS
RBC # BLD AUTO: 4.64 10*6/MM3 (ref 4.14–5.8)
SARS-COV-2 ORF1AB RESP QL NAA+PROBE: NOT DETECTED
SODIUM SERPL-SCNC: 139 MMOL/L (ref 136–145)
SP GR UR STRIP: 1.01 (ref 1–1.03)
UROBILINOGEN UR QL STRIP: NORMAL
WBC # BLD AUTO: 6.4 10*3/MM3 (ref 3.4–10.8)

## 2021-06-10 PROCEDURE — 81003 URINALYSIS AUTO W/O SCOPE: CPT

## 2021-06-10 PROCEDURE — 36415 COLL VENOUS BLD VENIPUNCTURE: CPT

## 2021-06-10 PROCEDURE — 85025 COMPLETE CBC W/AUTO DIFF WBC: CPT

## 2021-06-10 PROCEDURE — 93010 ELECTROCARDIOGRAM REPORT: CPT | Performed by: INTERNAL MEDICINE

## 2021-06-10 PROCEDURE — U0004 COV-19 TEST NON-CDC HGH THRU: HCPCS | Performed by: NURSE PRACTITIONER

## 2021-06-10 PROCEDURE — C9803 HOPD COVID-19 SPEC COLLECT: HCPCS | Performed by: NURSE PRACTITIONER

## 2021-06-10 PROCEDURE — U0005 INFEC AGEN DETEC AMPLI PROBE: HCPCS | Performed by: NURSE PRACTITIONER

## 2021-06-10 PROCEDURE — 71046 X-RAY EXAM CHEST 2 VIEWS: CPT

## 2021-06-10 PROCEDURE — 80053 COMPREHEN METABOLIC PANEL: CPT

## 2021-06-10 PROCEDURE — 93005 ELECTROCARDIOGRAM TRACING: CPT

## 2021-06-10 NOTE — DISCHARGE INSTRUCTIONS
Take the following medications the morning of surgery:    PANTOPRAZOLE  LEVOTHYROXINE  HYDROCODONE  AMLODIPINE    ARRIVAL TIME TO BE CALLED TO PATIENT.      If you are on prescription narcotic pain medication to control your pain you may also take that medication the morning of surgery.    General Instructions:  • Do not eat solid food after midnight the night before surgery.  • You may drink clear liquids day of surgery but must stop at least one hour before your hospital arrival time.  • It is beneficial for you to have a clear drink that contains carbohydrates the day of surgery.  We suggest a 12 to 20 ounce bottle of Gatorade or Powerade for non-diabetic patients or a 12 to 20 ounce bottle of G2 or Powerade Zero for diabetic patients. (Pediatric patients, are not advised to drink a 12 to 20 ounce carbohydrate drink)    Clear liquids are liquids you can see through.  Nothing red in color.     Plain water                               Sports drinks  Sodas                                   Gelatin (Jell-O)  Fruit juices without pulp such as white grape juice and apple juice  Popsicles that contain no fruit or yogurt  Tea or coffee (no cream or milk added)  Gatorade / Powerade  G2 / Powerade Zero    • Infants may have breast milk up to four hours before surgery.  • Infants drinking formula may drink formula up to six hours before surgery.   • Patients who avoid smoking, chewing tobacco and alcohol for 4 weeks prior to surgery have a reduced risk of post-operative complications.  Quit smoking as many days before surgery as you can.  • Do not smoke, use chewing tobacco or drink alcohol the day of surgery.   • If applicable bring your C-PAP/ BI-PAP machine.  • Bring any papers given to you in the doctor’s office.  • Wear clean comfortable clothes.  • Do not wear contact lenses, false eyelashes or make-up.  Bring a case for your glasses.   • Bring crutches or walker if applicable.  • Remove all piercings.  Leave jewelry  and any other valuables at home.  • Hair extensions with metal clips must be removed prior to surgery.  • The Pre-Admission Testing nurse will instruct you to bring medications if unable to obtain an accurate list in Pre-Admission Testing.        If you were given a blood bank ID arm band remember to bring it with you the day of surgery.    Preventing a Surgical Site Infection:  • For 2 to 3 days before surgery, avoid shaving with a razor because the razor can irritate skin and make it easier to develop an infection.    • Any areas of open skin can increase the risk of a post-operative wound infection by allowing bacteria to enter and travel throughout the body.  Notify your surgeon if you have any skin wounds / rashes even if it is not near the expected surgical site.  The area will need assessed to determine if surgery should be delayed until it is healed.  • The night prior to surgery shower using a fresh bar of anti-bacterial soap (such as Dial) and clean washcloth.  Sleep in a clean bed with clean clothing.  Do not allow pets to sleep with you.  • Shower on the morning of surgery using a fresh bar of anti-bacterial soap (such as Dial) and clean washcloth.  Dry with a clean towel and dress in clean clothing.  • Ask your surgeon if you will be receiving antibiotics prior to surgery.  • Make sure you, your family, and all healthcare providers clean their hands with soap and water or an alcohol based hand  before caring for you or your wound.    Day of surgery:  Your arrival time is approximately two hours before your scheduled surgery time.  Upon arrival, a Pre-op nurse and Anesthesiologist will review your health history, obtain vital signs, and answer questions you may have.  The only belongings needed at this time will be a list of your home medications and if applicable your C-PAP/BI-PAP machine.  A Pre-op nurse will start an IV and you may receive medication in preparation for surgery, including  something to help you relax.     Please be aware that surgery does come with discomfort.  We want to make every effort to control your discomfort so please discuss any uncontrolled symptoms with your nurse.   Your doctor will most likely have prescribed pain medications.      If you are going home after surgery you will receive individualized written care instructions before being discharged.  A responsible adult must drive you to and from the hospital on the day of your surgery and stay with you for 24 hours.  Discharge prescriptions can be filled by the hospital pharmacy during regular pharmacy hours.  If you are having surgery late in the day/evening your prescription may be e-prescribed to your pharmacy.  Please verify your pharmacy hours or chose a 24 hour pharmacy to avoid not having access to your prescription because your pharmacy has closed for the day.    If you are staying overnight following surgery, you will be transported to your hospital room following the recovery period.  [facility] has all private rooms.    If you have any questions please call Pre-Admission Testing at [phone].  Deductibles and co-payments are collected on the day of service. Please be prepared to pay the required co-pay, deductible or deposit on the day of service as defined by your plan.    Patient Education for Self-Quarantine Process    Following your COVID testing, we strongly recommend that you do not leave your home after you have been tested for COVID except to get medical care. This includes not going to work, school or to public areas.  If this is not possible for you to do please limit your activities to only required outings.  Be sure to wear a mask when you are with other people, practice social distancing and wash your hands frequently.      The following items provide additional details to keep you safe.  • Wash your hands with soap and water frequently for at least 20 seconds.   • Avoid touching  your eyes, nose and mouth with unwashed hands.  • Do not share anything - utensils, towels, food from the same bowl.   • Have your own utensils, drinking glass, dishes, towels and bedding.   • Do not have visitors.   • Do use FaceTime to stay in touch with family and friends.  • You should stay in a specific room away from others if possible.   • Stay at least 6 feet away from others in the home if you cannot have a dedicated room to yourself.   • Do not snuggle with your pet. While the CDC says there is no evidence that pets can spread COVID-19 or be infected from humans, it is probably best to avoid “petting, snuggling, being kissed or licked and sharing food (during self-quarantine)”, according to the CDC.   • Sanitize household surfaces daily. Include all high touch areas (door handles, light switches, phones, countertops, etc.)  • Do not share a bathroom with others, if possible.   • Wear a mask around others in your home if you are unable to stay in a separate room or 6 feet apart. If  you are unable to wear a mask, have your family member wear a mask if they must be within 6 feet of you.   Call your surgeon immediately if you experience any of the following symptoms:  • Sore Throat  • Shortness of Breath or difficulty breathing  • Cough  • Chills  • Body soreness or muscle pain  • Headache  • Fever  • New loss of taste or smell  • Do not arrive for your surgery ill.  Your procedure will need to be rescheduled to another time.  You will need to call your physician before the day of surgery to avoid any unnecessary exposure to hospital staff as well as other patients.

## 2021-06-11 ENCOUNTER — HOSPITAL ENCOUNTER (OUTPATIENT)
Facility: HOSPITAL | Age: 76
Setting detail: HOSPITAL OUTPATIENT SURGERY
Discharge: HOME | End: 2021-06-11
Attending: STUDENT IN AN ORGANIZED HEALTH CARE EDUCATION/TRAINING PROGRAM | Admitting: STUDENT IN AN ORGANIZED HEALTH CARE EDUCATION/TRAINING PROGRAM

## 2021-06-11 ENCOUNTER — ANESTHESIA (OUTPATIENT)
Dept: PERIOP | Facility: HOSPITAL | Age: 76
End: 2021-06-11

## 2021-06-11 ENCOUNTER — ANESTHESIA EVENT (OUTPATIENT)
Dept: PERIOP | Facility: HOSPITAL | Age: 76
End: 2021-06-11

## 2021-06-11 VITALS
DIASTOLIC BLOOD PRESSURE: 78 MMHG | SYSTOLIC BLOOD PRESSURE: 126 MMHG | TEMPERATURE: 97.9 F | BODY MASS INDEX: 28.12 KG/M2 | HEART RATE: 63 BPM | OXYGEN SATURATION: 98 % | WEIGHT: 195.99 LBS | RESPIRATION RATE: 16 BRPM

## 2021-06-11 DIAGNOSIS — S66.324A LACERATION OF EXTENSOR MUSCLE, FASCIA AND TENDON OF RIGHT RING FINGER AT WRIST AND HAND LEVEL, INITIAL ENCOUNTER: Primary | ICD-10-CM

## 2021-06-11 PROCEDURE — 25010000002 FENTANYL CITRATE (PF) 50 MCG/ML SOLUTION: Performed by: NURSE ANESTHETIST, CERTIFIED REGISTERED

## 2021-06-11 PROCEDURE — 25010000002 PROPOFOL 10 MG/ML EMULSION: Performed by: NURSE ANESTHETIST, CERTIFIED REGISTERED

## 2021-06-11 RX ORDER — MAGNESIUM HYDROXIDE 1200 MG/15ML
LIQUID ORAL AS NEEDED
Status: DISCONTINUED | OUTPATIENT
Start: 2021-06-11 | End: 2021-06-11 | Stop reason: HOSPADM

## 2021-06-11 RX ORDER — PROPOFOL 10 MG/ML
VIAL (ML) INTRAVENOUS CONTINUOUS PRN
Status: DISCONTINUED | OUTPATIENT
Start: 2021-06-11 | End: 2021-06-11 | Stop reason: SURG

## 2021-06-11 RX ORDER — FENTANYL CITRATE 50 UG/ML
50 INJECTION, SOLUTION INTRAMUSCULAR; INTRAVENOUS
Status: CANCELLED | OUTPATIENT
Start: 2021-06-11

## 2021-06-11 RX ORDER — DROPERIDOL 2.5 MG/ML
0.62 INJECTION, SOLUTION INTRAMUSCULAR; INTRAVENOUS ONCE AS NEEDED
Status: CANCELLED | OUTPATIENT
Start: 2021-06-11

## 2021-06-11 RX ORDER — SODIUM CHLORIDE 0.9 % (FLUSH) 0.9 %
3-10 SYRINGE (ML) INJECTION AS NEEDED
Status: DISCONTINUED | OUTPATIENT
Start: 2021-06-11 | End: 2021-06-11 | Stop reason: HOSPADM

## 2021-06-11 RX ORDER — DIPHENHYDRAMINE HYDROCHLORIDE 50 MG/ML
12.5 INJECTION INTRAMUSCULAR; INTRAVENOUS
Status: CANCELLED | OUTPATIENT
Start: 2021-06-11

## 2021-06-11 RX ORDER — NALOXONE HCL 0.4 MG/ML
0.4 VIAL (ML) INJECTION AS NEEDED
Status: CANCELLED | OUTPATIENT
Start: 2021-06-11

## 2021-06-11 RX ORDER — HYDROCODONE BITARTRATE AND ACETAMINOPHEN 5; 325 MG/1; MG/1
1 TABLET ORAL EVERY 4 HOURS PRN
Qty: 5 TABLET | Refills: 0 | Status: SHIPPED | OUTPATIENT
Start: 2021-06-11 | End: 2022-05-11

## 2021-06-11 RX ORDER — CLINDAMYCIN PHOSPHATE 900 MG/50ML
900 INJECTION INTRAVENOUS ONCE
Status: COMPLETED | OUTPATIENT
Start: 2021-06-11 | End: 2021-06-11

## 2021-06-11 RX ORDER — LIDOCAINE HYDROCHLORIDE 10 MG/ML
0.5 INJECTION, SOLUTION EPIDURAL; INFILTRATION; INTRACAUDAL; PERINEURAL ONCE AS NEEDED
Status: DISCONTINUED | OUTPATIENT
Start: 2021-06-11 | End: 2021-06-11 | Stop reason: HOSPADM

## 2021-06-11 RX ORDER — HYDROCODONE BITARTRATE AND ACETAMINOPHEN 7.5; 325 MG/1; MG/1
1 TABLET ORAL ONCE AS NEEDED
Status: CANCELLED | OUTPATIENT
Start: 2021-06-11

## 2021-06-11 RX ORDER — LIDOCAINE HYDROCHLORIDE AND EPINEPHRINE 10; 10 MG/ML; UG/ML
INJECTION, SOLUTION INFILTRATION; PERINEURAL AS NEEDED
Status: DISCONTINUED | OUTPATIENT
Start: 2021-06-11 | End: 2021-06-11 | Stop reason: HOSPADM

## 2021-06-11 RX ORDER — ONDANSETRON 2 MG/ML
4 INJECTION INTRAMUSCULAR; INTRAVENOUS ONCE AS NEEDED
Status: CANCELLED | OUTPATIENT
Start: 2021-06-11

## 2021-06-11 RX ORDER — SODIUM CHLORIDE, SODIUM LACTATE, POTASSIUM CHLORIDE, CALCIUM CHLORIDE 600; 310; 30; 20 MG/100ML; MG/100ML; MG/100ML; MG/100ML
9 INJECTION, SOLUTION INTRAVENOUS CONTINUOUS
Status: DISCONTINUED | OUTPATIENT
Start: 2021-06-11 | End: 2021-06-11 | Stop reason: HOSPADM

## 2021-06-11 RX ORDER — FENTANYL CITRATE 50 UG/ML
50 INJECTION, SOLUTION INTRAMUSCULAR; INTRAVENOUS
Status: DISCONTINUED | OUTPATIENT
Start: 2021-06-11 | End: 2021-06-11 | Stop reason: HOSPADM

## 2021-06-11 RX ORDER — SODIUM CHLORIDE 0.9 % (FLUSH) 0.9 %
3 SYRINGE (ML) INJECTION EVERY 12 HOURS SCHEDULED
Status: DISCONTINUED | OUTPATIENT
Start: 2021-06-11 | End: 2021-06-11 | Stop reason: HOSPADM

## 2021-06-11 RX ORDER — SODIUM CHLORIDE, SODIUM LACTATE, POTASSIUM CHLORIDE, CALCIUM CHLORIDE 600; 310; 30; 20 MG/100ML; MG/100ML; MG/100ML; MG/100ML
INJECTION, SOLUTION INTRAVENOUS CONTINUOUS PRN
Status: DISCONTINUED | OUTPATIENT
Start: 2021-06-11 | End: 2021-06-11 | Stop reason: SURG

## 2021-06-11 RX ORDER — FENTANYL CITRATE 50 UG/ML
INJECTION, SOLUTION INTRAMUSCULAR; INTRAVENOUS AS NEEDED
Status: DISCONTINUED | OUTPATIENT
Start: 2021-06-11 | End: 2021-06-11 | Stop reason: SURG

## 2021-06-11 RX ORDER — MIDAZOLAM HYDROCHLORIDE 1 MG/ML
0.5 INJECTION INTRAMUSCULAR; INTRAVENOUS
Status: DISCONTINUED | OUTPATIENT
Start: 2021-06-11 | End: 2021-06-11 | Stop reason: HOSPADM

## 2021-06-11 RX ORDER — ENALAPRILAT 2.5 MG/2ML
1.25 INJECTION INTRAVENOUS ONCE AS NEEDED
Status: CANCELLED | OUTPATIENT
Start: 2021-06-11

## 2021-06-11 RX ADMIN — SODIUM CHLORIDE, POTASSIUM CHLORIDE, SODIUM LACTATE AND CALCIUM CHLORIDE: 600; 310; 30; 20 INJECTION, SOLUTION INTRAVENOUS at 12:46

## 2021-06-11 RX ADMIN — FENTANYL CITRATE 50 MCG: 50 INJECTION INTRAMUSCULAR; INTRAVENOUS at 13:15

## 2021-06-11 RX ADMIN — PROPOFOL 100 MCG/KG/MIN: 10 INJECTION, EMULSION INTRAVENOUS at 13:15

## 2021-06-11 RX ADMIN — CLINDAMYCIN IN 5 PERCENT DEXTROSE 900 MG: 18 INJECTION, SOLUTION INTRAVENOUS at 13:07

## 2021-06-11 RX ADMIN — FENTANYL CITRATE 50 MCG: 50 INJECTION INTRAMUSCULAR; INTRAVENOUS at 13:17

## 2021-06-11 NOTE — ANESTHESIA PREPROCEDURE EVALUATION
Anesthesia Evaluation     Patient summary reviewed and Nursing notes reviewed   NPO Solid Status: > 8 hours  NPO Liquid Status: > 2 hours           Airway   Mallampati: II  TM distance: >3 FB  Neck ROM: full  Dental - normal exam     Pulmonary - normal exam    breath sounds clear to auscultation  (+) a smoker (1 pack years) Former,   Cardiovascular - normal exam    ECG reviewed  Rhythm: regular  Rate: normal    (+) hypertension less than 2 medications, dysrhythmias (LAFB) PVC, hyperlipidemia,   (-) angina, orthopnea, PND, MARCOS    ROS comment: ABNORMAL ECG -  Sinus rhythm  Ventricular premature complex  Left anterior fascicular block  NO PRIOR TRACING AVAILABLE FOR COMPARISON    Neuro/Psych  (+) headaches (Migraine without aura and without status migrainosus), dizziness/light headedness, psychiatric history Anxiety,     GI/Hepatic/Renal/Endo    (+)  GERD,  thyroid problem hypothyroidism    Musculoskeletal     (+) back pain, chronic pain, neck pain,   Abdominal    Substance History - negative use     OB/GYN negative ob/gyn ROS         Other   arthritis,    history of cancer (Prostate cancer )                    Anesthesia Plan    ASA 3     MAC       Anesthetic plan, all risks, benefits, and alternatives have been provided, discussed and informed consent has been obtained with: patient.

## 2021-06-11 NOTE — INTERVAL H&P NOTE
H&P reviewed. The patient was examined and there are no changes to the H&P.    David Leonard MD, PhD  Orthopaedic & Hand Surgery  [facility]  [phone] - [place] [facility]  [phone] - [facility]

## 2021-06-11 NOTE — DISCHARGE INSTRUCTIONS
Orthopaedic & Hand Surgery   tendon repair Discharge Instructions  Dr. David Leonard  [phone]    • INCISION CARE  o The postoperative dressings should be left in place until your follow-up appointment.  - You will receive instructions at this appointment on whether your injury requires continued immobilization.  o Your surgeon will instruct you regarding suture or staple removal. In general, this happens at the 1-2-week postoperative appointment.  o Once postoperative splinting is discontinued, new dry dressings can then be placed around the wound and held in place with an Ace wrap.   o Dressings should be changed at least daily or if visibly soiled.  o Wash your hands prior to dressing changes  o No creams or ointments to the incision until 4 weeks post op.   o You may remove dressing once the incision is completely free of drainage.  o Do not touch or pick at the incision  o Check incision every day and notify surgeon immediately if any of the following signs or symptoms are seen:  - Increase in redness  - Increase in swelling around the incision and of the entire extremity  - Increase in pain  - NEW drainage or oozing from the incision  - Pulling apart of the edges of the incision  - Increase in overall body temperature (greater than 100.4°F)    • ACTIVITIES  o Exercises:  - Physical therapy may or may not be needed after surgery. Once some healing has occurred, it will be possible to start therapy if you wish. However, most patients get their function back fairly well after surgery without formal therapy.  o Activities of Daily Living:   - Showering may begin immediately if the postoperative dressing can be protected. The dressing/splint and incision cannot get wet after surgery.   - No tub baths, hot tubs, or swimming pools for 4 weeks.  - May shower and let water run over the incision once the sutures are removed if there is no drainage and the wound is well healing. A new dry dressing can be  applied after showering.       • Restrictions  o Weight: Do not put weight on the injured arm until instructed to do so. Your surgeon will discuss with restrictions in terms of activities allowed. In general, anything more strenuous than holding a pen or piece of paper should be avoided, the other hand should do the vast majority of the work until the injured side had healed enough that it is not painful.  o Driving: Many patients have questions about when it is safe to return to driving. The answer is that this is extremely variable. It depends on how quickly you heal. Until you can safely navigate the steering wheel, and are off of all narcotics, driving is not permitted. Your surgeon cannot “clear” you to return to driving, only you can make the decision when you feel it is safe.     • Pain Control  o Ice:  - Ice is an excellent pain reliever. This can be used regardless of whether or not you are taking pain medication.  - Apply an ice pack to the surgical area for 20 minutes at a time, removing it for at least an hour to prevent frostbite.  - You should keep a towel between any dressings on the ice pack to prevent them from getting damp and from developing frostbite on the operative site.  o Elevation:  - Elevation is another easy way to control pain after surgery. Whenever possible, keep the operative limb elevated above the level of your heart to reduce swelling.  o Acetaminophen (Tylenol):  - CLASSIFICATION: A non-narcotic medication that is available without a prescription.  • Acetaminophen controls pain but does not affect swelling or inflammation.  - DRIVING: Acetaminophen will not impair your ability to drive. It is safe to drive while taking if your physical condition does not limit you.  - POTENTIAL SIDE EFFECTS: nausea, stomach upset, liver failure if taken in large doses.  - Interactions: Some narcotic medications contain acetaminophen. If you have a narcotic prescription, make sure to cut back on the  acetaminophen if you are taking the narcotic. You should never take more 3000 mg of acetaminophen in one 24-hour period.  - DOSAGE:  • Following surgery, you may take two regular strength (325 mg) tabs to control pain every 6 hours. This can be taken with NSAIDs (see below) or alternating the two.  • After the initial surgical pain begins to resolve, you may begin to decrease the pain medication. By the end of a few weeks, you should be off of pain medications.  o NSAIDS: This includes aspirin, ibuprofen, naproxen, Motrin, Aleve, Mobic, Celebrex  - CLASSIFICATION: These are non-narcotic medications that are available without a prescription.  • They are particularly effective at reducing swelling and inflammation  - DRIVING: These medications will not impair your ability to drive. It is safe to drive while taking these medications if your physical condition does not limit you.  - POTENTIAL SIDE EFFECTS: nausea, stomach upset, ulcer, gastric bleeding, kidney failure.  - DOSAGE:  • Following surgery, you may take ibuprofen (Motrin) 600 mg to control pain every 6 hours with food. It helps to take it scheduled (around the clock) to allow it to help reduce swelling.  • After the initial surgical pain begins to resolve, you may begin to decrease the pain medication. By the end of a few weeks, you should be off of pain medications.  o Narcotic Pain Medications: Utilized after surgery. This is some general information about these medications.  - CLASSIFICATION: Prescription pain medications are called Opioids and are narcotics  - LEGALITIES: It is illegal to share narcotics with others  - DRIVING: it is illegal to drive while under the influence of narcotics. Doing so is a DUI.  - POTENTIAL SIDE EFFECTS: nausea, vomiting, itching, dizziness, drowsiness, dry mouth, constipation, and difficulty urinating.  - POTENTIAL ADVERSE EFFECTS:  • Opioid tolerance can develop with use of pain medications and this simply means that it  requires more and more of the medication to control pain. However, this is seen more in patients that use opioids for longer periods of time.  • Opioid dependence can develop with use of Opioids. People with opioid dependence will experience withdrawal symptoms upon cessation of the medication.  • Opioid addiction can develop with use of Opioids. The incidence of this is very unlikely in patients who take the medications as ordered and stop the medications as instructed.  • Opioid overdose can be dangerous, but is unlikely when the medication is taken as ordered and stopped when ordered. It is important not to mix opioids with alcohol as this can lead to over sedation and respiratory difficulty.  - DOSAGE:  • After the initial surgical pain begins to resolve, you should begin to decrease the pain medication dose and frequency. By the end of a few weeks, you should be off of narcotic pain medications.  • Refills will not be given by the office during evening hours, on weekends, or after 6 weeks post-op. You are responsible for weaning off of pain medication.  • To seek refills on pain medications during the initial 6-week post-operative period, you must call the office to request the refill. The office will then notify you when to  the prescription. DO NOT wait until you are out of the medication to request a refill. Prescriptions will not be filled over the weekend and it may take a couple days for the prescription to be available. Someone will have to pick the prescription in person at the office.    • Other Medications  o Anticoagulants: After upper extremity surgery most patients do not require an anticoagulant unless you have another injury that will be keeping you from mobilizing.  - If you were already taking an anticoagulant (commonly Aspirin, Coumadin, or Plavix) you will likely be resuming your normal dose postoperatively and will be continuing that medication at the discretion of the prescribing  physician.  o Stool Softeners: You will be at greater risk of constipation after surgery due to being less mobile and the narcotic pain medications.  - Take stool softeners as needed. Over the counter Colace 100 mg 1-2 capsules twice daily can be taken.  - If stools become too loose or too frequent, please decreases the dosage or stop the stool softener.  - If constipation occurs despite use of stool softeners, you are to continue the stool softeners and add a laxative (Milk of Magnesia 1 ounce daily as needed)  - Drink plenty of fluids, and eat fruits and vegetables during your recovery time. Getting up and mobilizing will help the bowels to recover their regular function, as will weaning off of all narcotics when the pain becomes tolerable.    • FOLLOW-UP VISITS  o You will need to follow up in the office with your surgeon in 1-2 weeks, or as instructed elsewhere in your discharge paperwork. Please call this number [phone] to schedule this appointment if one has not already been made.   o If you have any concerns or suspected complications prior to your follow up visit, please call the office. Do not wait until your appointment time if you suspect complications. These will need to be addressed in the office promptly.      David Leonard MD, PhD  Orthopaedic Surgery  [facility]            What to expect after a Nerve Block    Nerve blocks administered to block pain affect many types of nerves, including those nerves that control movement, pain, and normal sensation. Following a nerve block, you may notice some bruising at the site where the block was given. You may experience sensations such as: numbness of the affected area or limb, tingling, heaviness (that is the limb feels heavy to you), weakness or inability to move the affected arm or leg, or a feeling as if your arm or leg has “fallen asleep.”     A nerve block can last from 2 to 36 hours depending on the medications used.  Usually  the weakness wears off first followed by the tingling and heaviness. As the block wears off, you may begin to notice pain; however, this sequence of events may occur in any order. Typically, you will be able to move your limb before you will feel it. Once a nerve block begins to wear off, the effects are usually completely gone within 60 minutes.  If you experience continued side effects that you believe are block related for longer than 48 hours, please call your healthcare provider. Please see block-specific instructions below.    Instructions for any block involving the shoulder or arm  • If you have had any kind of shoulder/arm block, you will go home with your arm in a sling. Wear the sling until the block has completely worn off. You may be required to wear it for a longer period of time per your surgeon’s recommendations.  • If you have had a shoulder/arm block, it is a good idea to sleep on a recliner with pillows under your arm.    You may experience symptoms such as:  Shortness of breath  Hoarseness   Blurry vision  Unequal pupils  Drooping of your face on the same side as the block was performed    These are side effects associated with this kind of block and should go away within 12 hours.    Note: If you have severe or prolonged shortness of breath, please seek medical assistance as soon as possible.     Protection of a “blocked” arm or leg (limb)  • After a nerve block, you cannot feel pain, pressure, or extremes of temperature in the affected limb. And because of this, your blocked limb is at more risk for injury. For example, it is possible to burn your limb on an extremely hot surface without feeling it.     • When resting, it is important to reposition your limb periodically to avoid prolonged pressure on it. This may require the use of pillows and padding.    • While sleeping, you should avoid rolling onto the affected limb or putting too much pressure on it.     • If you have a cast or tight  dressing, check the color of your fingers or toes of the affected limb. Call your surgeon if they look discolored (that is, dusky, dark colored).    • Use caution in cold weather. Cover your limb appropriately to protect it from the cold.      Pain Management:    Your surgeon will give you a prescription for pain medication. Begin taking this before the nerve block wears off. Bear in mind that sometimes the block can wear off in the middle of the night.

## 2021-06-11 NOTE — OP NOTE
Orthopaedic & Hand Surgery  Extensor Tendon Repair Operative Report  Dr. David Leonard  [phone]      PATIENT NAME: Kali Crespo  MRN: [id number]  : 1945 AGE: 75 y.o. GENDER: male  DATE OF OPERATION: 2021  PREOPERATIVE DIAGNOSIS:   • Left ring finger zone 4 extensor tendon laceration  POSTOPERATIVE DIAGNOSIS:   • Left ring finger zone 4 extensor tendon laceration  OPERATION PERFORMED:   • Left ring finger zone 4 extensor tendon repair (CPT 82321)  SURGEON: David Leonard MD, PhD  ANESTHESIA: Local and General  ASSISTANT: None  ESTIMATED BLOOD LOSS: Minimal  SPONGE AND NEEDLE COUNT: Correct  INDICATIONS: This patient is noted to have laceration to the extensor tendon of the left ring finger after he cut it with a glass bowl that shattered in his hand. He had drooping of the finger which was not amenable to conservative treatment. The risks of surgery were discussed and included Pain, Bleeding, Infection, Complications of anesthesia, Damage to blood vessels, nerves and other surrounding structures, Stiffness, Scar, Incomplete resolution or recurrence of the condition, failure of the repair, further procedures and Unforeseen risks of surgery including stroke, heart stoppage or death. No guarantees were made. Following a thorough discussion, questions were solicited and answered to his satisfaction. Verbal and written consent were obtained prior to proceeding with surgery.    COMPONENTS:   • 4-0 looped Supramid suture x3  • 6-0 Prolene epitendinous repair    SPECIMENS:   • None    PERTINENT FINDINGS:   • Following repair, the patient was awakened and was able to move his finger through a full range of motion into a composite fist.  • No gapping of the tendon repair site with active range of motion.      TOURNIQUET TIME:   • 35 minutes    DETAILS OF PROCEDURE:  The patient was met in the preoperative area. The site was marked. The consent and H&P were reviewed. The patient was then  transferred to the operative suite and placed supine on the operative table. The patient submitted to anesthesia. A tourniquet was placed on the left upper arm. Surgical alcohol was used to thoroughly clean the entire operative extremity. The arm was then prepped in the normal sterile fashion, which included Hibiclens and multiple layers of sterile drapes.     A surgical timeout was taken to verify the patient's identity, the surgical side/site, planned procedure and the administration of preoperative antibiotics. Bipolar cautery and 3.5-power loupe magnification were used.     The skin on the dorsal aspect of the hand was infiltrated with 1% lidocaine with epinephrine to allow for a dorsal hand block. The limb was exsanguinated with an Esmarch bandage and the tourniquet was inflated.    An incision was designed, making use of the existing laceration, extending it proximally and distally over the dorsal aspect of the ring finger and the fourth metacarpal ray. Incision was made sharply through the skin and the subcutaneous tissues were bluntly dissected, raising full-thickness flaps down to the level of the sagittal band which was lacerated transversely. We incised the sagittal band for approximately 5 mm proximal and distal, leaving some intact over the metacarpal head to expose the extensor tendon.    We examined the injury. It was found to be clear of any sign of infection and there was no gross contamination. The tendon edges were cleanly cut, however, slightly rough, these were trimmed sharply by removing approximately 1 mm proximally and distally to create healthy ends. The interosseous tendons were seen and noted to be intact. We explored the joint capsule deep to the tendon and found that it was intact..    We then set about repairing the extensor tendon. Using 4-0 Supramid suture, we performed a modified M monge repair using 2 independent sutures set distally with knots in the proximal segment and a third  looped Supramid suture locked proximally and knotted in the distal portion of the tendon. We backed this repair up with a 6-0 running epitendinous Prolene stitch. Care was taken to make sure that the tendon ends were approximated, pooching slightly, however, not bunched. At the completion of this repair, tenodesis was used to compare the resting cascade and found that the ring finger was well lined up with the long and small. Wound edges were therefore infiltrated with lidocaine with epinephrine and the wound was thoroughly irrigated. The rent in the sagittal band was repaired with several interrupted 6-0 Prolene stitches.    At this point, tourniquet was deflated and hemostasis was achieved through a combination of direct pressure for approximately 5 minutes and the use of a bipolar as needed. Patient was then awoken and was able to take his ring finger through a range of motion from slight hyperextension to a composite fist. There was no gapping at the repair site throughout this range of motion. Skin was therefore closed using 4-0 Prolene suture, vertical mattress configuration over a vessel loop pledget.    The wound was dressed with sterile dressings and a dorsal splint to the level of the DIP joints was placed with the hand in an intrinsic plus position.    Anesthesia was fully reversed without complication, the patient was transferred to the [facility] and taken to the recovery room in stable condition. Sponge and needle count were reported to me as correct. There were no complications. The patient tolerated the procedure well.    Post Operative Plan:   • Patient is instructed to use elevation, ice and ibuprofen for primary  pain control, relying on existing narcotic for breakthrough pain.  • Instructed to keep dressing on until first postoperative visit.  • Noted that he may flex and extend at the DIP joint but should not try to wiggle the finger beyond that  • Plan for early active motion and the direction of  certified hand therapist.    David Leonard MD, PhD  Orthopaedic & Hand Surgery  [facility]  [phone] - [place] Office  [phone] - [facility]

## 2021-06-11 NOTE — ANESTHESIA POSTPROCEDURE EVALUATION
Patient: Kali Crespo    Procedure Summary     Date: 06/11/21 Room / Location:  [facility]  /  [facility]    Anesthesia Start: 1307 Anesthesia Stop: 1439    Procedure: LEFT RING FINGER EXTENSOR TENDON REPAIR (Left Fingers) Diagnosis:     Surgeons: David Leonard MD Provider: Armin Lacy MD    Anesthesia Type: MAC ASA Status: 3          Anesthesia Type: MAC    Vitals  Vitals Value Taken Time   /76 06/11/21 1439   Temp     Pulse 70 06/11/21 1439   Resp 16 06/11/21 1439   SpO2 97 % 06/11/21 1439           Post Anesthesia Care and Evaluation    Patient location during evaluation: PHASE II  Patient participation: complete - patient participated  Level of consciousness: awake  Pain score: 1  Pain management: adequate  Airway patency: patent  Anesthetic complications: No anesthetic complications  PONV Status: none  Cardiovascular status: acceptable  Respiratory status: acceptable  Hydration status: acceptable    Comments: /76   Pulse 70   Temp 36.7 °C (98.1 °F) (Oral)   Resp 16   Wt 88.9 kg (195 lb 15.8 oz)   SpO2 97%   BMI 28.12 kg/m²          DISPLAY PLAN FREE TEXT

## 2021-08-08 NOTE — PROGRESS NOTES
Subjective   Kali Crespo is a 76 y.o. male.     History of Present Illness     Chief Complaint:   Chief Complaint   Patient presents with   • Meniere's     med refill due   - [facility] per pt        Kali Crespo 76 y.o. male who presents today for Medical Management of the below listed issues and medication refills.  he has a problem list of   Patient Active Problem List   Diagnosis   • Cervical pain   • Migraine without aura and without status migrainosus, not intractable   • Vertigo   • Benign essential HTN   • Acid reflux   • Encounter for pre-operative cardiovascular clearance   • Abnormal EKG   • Hyperlipidemia   • Erectile dysfunction   • Prostate cancer (CMS/HCC)   • Meniere disease, bilateral   • Lumbar degenerative disc disease   • Low back pain   • Malignant neoplastic disease (CMS/HCC)   • Alternating esotropia   • Diplopia   • Laceration of extensor muscle, fascia and tendon of right ring finger at wrist and hand level, initial encounter   .  Since the last visit, he has overall felt well.  he has been compliant with   Current Outpatient Medications:   •  amLODIPine (NORVASC) 5 MG tablet, Take 1 tablet by mouth Daily., Disp: 90 tablet, Rfl: 1  •  diazePAM (VALIUM) 2 MG tablet, Take 1 tablet by mouth Every 12 (Twelve) Hours As Needed (vertigo)., Disp: 60 tablet, Rfl: 2  •  fluticasone (FLONASE) 50 MCG/ACT nasal spray, 2 sprays into the nostril(s) as directed by provider Daily., Disp: 48 g, Rfl: 1  •  HYDROcodone-acetaminophen (NORCO) 5-325 MG per tablet, Take 1 tablet by mouth Every 4 (Four) Hours As Needed for Severe Pain  (take for surgery only if pain not controlled by ibuprofen)., Disp: 5 tablet, Rfl: 0  •  HYDROcodone-acetaminophen (NORCO) 7.5-325 MG per tablet, Take 1 tablet by mouth Every 8 (Eight) Hours As Needed., Disp: , Rfl:   •  irbesartan (AVAPRO) 300 MG tablet, Take 1 tablet by mouth Daily., Disp: 90 tablet, Rfl: 1  •  levothyroxine (SYNTHROID, LEVOTHROID) 25 MCG tablet,  "Take 1 tablet by mouth Daily., Disp: 90 tablet, Rfl: 1  •  loratadine (CLARITIN) 10 MG tablet, Take 10 mg by mouth daily., Disp: , Rfl:   •  Multiple Vitamins-Minerals (CENTRAL-KEVON FOR SENIORS PO), Take 1 tablet by mouth Daily. HOLD PER MD INSTR, Disp: , Rfl:   •  Omega-3 Fatty Acids (FISH OIL) 1000 MG capsule capsule, Take 1,000 mg by mouth Daily. HOLD PER MD INSTR, Disp: , Rfl:   •  pantoprazole (PROTONIX) 40 MG EC tablet, Take 1 tablet by mouth Daily., Disp: 90 tablet, Rfl: 1  •  sildenafil (REVATIO) 20 MG tablet, Take 2-3 tabs QD prn, Disp: 60 tablet, Rfl: 11  •  simvastatin (ZOCOR) 20 MG tablet, Take 1 tablet by mouth Every Evening., Disp: 90 tablet, Rfl: 1  •  Thiamine HCl (VITAMIN B-1) 100 MG tablet, Take 500 mg by mouth Daily. HOLD PER MD INSTR, Disp: , Rfl: .  he denies medication side effects.    All of the other chronic condition(s) listed above are stable w/o issues.    /75   Pulse 60   Temp 97.6 °F (36.4 °C) (Oral)   Resp 14   Ht 177.8 cm (70\")   Wt 90.7 kg (200 lb)   BMI 28.70 kg/m²     Results for orders placed or performed in visit on 06/10/21   COVID-19,[facility], NP/OP SWAB IN UTM/VTM/[facility] TRANSPORT MEDIA,24 HR TAT - Swab, Nasopharynx    Specimen: Nasopharynx; Swab   Result Value Ref Range    COVID19 Not Detected Not Detected - Ref. Range   Comprehensive Metabolic Panel    Specimen: Blood   Result Value Ref Range    Glucose 99 65 - 99 mg/dL    BUN 13 8 - 23 mg/dL    Creatinine 0.93 0.76 - 1.27 mg/dL    Sodium 139 136 - 145 mmol/L    Potassium 4.0 3.5 - 5.2 mmol/L    Chloride 103 98 - 107 mmol/L    CO2 27.2 22.0 - 29.0 mmol/L    Calcium 9.4 8.6 - 10.5 mg/dL    Total Protein 6.6 6.0 - 8.5 g/dL    Albumin 4.10 3.50 - 5.20 g/dL    ALT (SGPT) 18 1 - 41 U/L    AST (SGOT) 16 1 - 40 U/L    Alkaline Phosphatase 111 39 - 117 U/L    Total Bilirubin 0.5 0.0 - 1.2 mg/dL    eGFR Non African Amer 79 >60 mL/min/1.73    Globulin 2.5 gm/dL    A/G Ratio 1.6 g/dL    BUN/Creatinine " Ratio 14.0 7.0 - 25.0    Anion Gap 8.8 5.0 - 15.0 mmol/L   Urinalysis With Microscopic If Indicated (No Culture) - Urine, Clean Catch    Specimen: Urine, Clean Catch   Result Value Ref Range    Color, UA Yellow Yellow, Straw    Appearance, UA Clear Clear    pH, UA 7.0 5.0 - 8.0    Specific Gravity, UA 1.014 1.005 - 1.030    Glucose, UA Negative Negative    Ketones, UA Negative Negative    Bilirubin, UA Negative Negative    Blood, UA Negative Negative    Protein, UA Negative Negative    Leuk Esterase, UA Negative Negative    Nitrite, UA Negative Negative    Urobilinogen, UA 0.2 E.U./dL 0.2 - 1.0 E.U./dL   CBC Auto Differential    Specimen: Blood   Result Value Ref Range    WBC 6.40 3.40 - 10.80 10*3/mm3    RBC 4.64 4.14 - 5.80 10*6/mm3    Hemoglobin 14.3 13.0 - 17.7 g/dL    Hematocrit 41.4 37.5 - 51.0 %    MCV 89.2 79.0 - 97.0 fL    MCH 30.8 26.6 - 33.0 pg    MCHC 34.5 31.5 - 35.7 g/dL    RDW 13.6 12.3 - 15.4 %    RDW-SD 44.2 37.0 - 54.0 fl    MPV 9.3 6.0 - 12.0 fL    Platelets 255 140 - 450 10*3/mm3    Neutrophil % 69.2 42.7 - 76.0 %    Lymphocyte % 16.3 (L) 19.6 - 45.3 %    Monocyte % 5.2 5.0 - 12.0 %    Eosinophil % 8.1 (H) 0.3 - 6.2 %    Basophil % 0.9 0.0 - 1.5 %    Immature Grans % 0.3 0.0 - 0.5 %    Neutrophils, Absolute 4.43 1.70 - 7.00 10*3/mm3    Lymphocytes, Absolute 1.04 0.70 - 3.10 10*3/mm3    Monocytes, Absolute 0.33 0.10 - 0.90 10*3/mm3    Eosinophils, Absolute 0.52 (H) 0.00 - 0.40 10*3/mm3    Basophils, Absolute 0.06 0.00 - 0.20 10*3/mm3    Immature Grans, Absolute 0.02 0.00 - 0.05 10*3/mm3    nRBC 0.0 0.0 - 0.2 /100 WBC   ECG 12 Lead   Result Value Ref Range    QT Interval 376 ms           The following portions of the patient's history were reviewed and updated as appropriate: allergies, current medications, past family history, past medical history, past social history, past surgical history and problem list.    Review of Systems   Constitutional: Negative for activity change, chills and fever.    Respiratory: Negative for cough.    Cardiovascular: Negative for chest pain.   Psychiatric/Behavioral: Negative for dysphoric mood.       Objective   Physical Exam  Constitutional:       General: He is not in acute distress.     Appearance: He is well-developed.   Pulmonary:      Effort: Pulmonary effort is normal.   Neurological:      Mental Status: He is alert and oriented to person, place, and time.   Psychiatric:         Behavior: Behavior normal.         Thought Content: Thought content normal.       The patient has read and signed the [facility] Controlled Substance Contract.  I will continue to see patient for regular follow up appointments.  They are well controlled on their medication.  KERVIN has been reviewed by me and is updated every 3 months. The patient is aware of the potential for addiction and dependence.    Assessment/Plan   Diagnoses and all orders for this visit:    1. Meniere disease, bilateral  -     diazePAM (VALIUM) 2 MG tablet; Take 1 tablet by mouth Every 12 (Twelve) Hours As Needed (vertigo).  Dispense: 60 tablet; Refill: 2

## 2021-08-09 ENCOUNTER — OFFICE VISIT (OUTPATIENT)
Dept: FAMILY MEDICINE CLINIC | Facility: CLINIC | Age: 76
End: 2021-08-09

## 2021-08-09 VITALS
TEMPERATURE: 97.6 F | HEART RATE: 60 BPM | SYSTOLIC BLOOD PRESSURE: 141 MMHG | BODY MASS INDEX: 28.63 KG/M2 | DIASTOLIC BLOOD PRESSURE: 75 MMHG | RESPIRATION RATE: 14 BRPM | WEIGHT: 200 LBS | HEIGHT: 70 IN

## 2021-08-09 DIAGNOSIS — H81.03 MENIERE DISEASE, BILATERAL: Chronic | ICD-10-CM

## 2021-08-09 PROCEDURE — 99213 OFFICE O/P EST LOW 20 MIN: CPT | Performed by: FAMILY MEDICINE

## 2021-08-09 RX ORDER — DIAZEPAM 2 MG/1
2 TABLET ORAL EVERY 12 HOURS PRN
Qty: 60 TABLET | Refills: 2 | Status: SHIPPED | OUTPATIENT
Start: 2021-08-09 | End: 2021-11-09 | Stop reason: SDUPTHER

## 2021-09-10 DIAGNOSIS — E03.9 ACQUIRED HYPOTHYROIDISM: ICD-10-CM

## 2021-09-10 DIAGNOSIS — I10 BENIGN ESSENTIAL HTN: ICD-10-CM

## 2021-09-10 DIAGNOSIS — E78.2 MIXED HYPERLIPIDEMIA: ICD-10-CM

## 2021-09-10 DIAGNOSIS — K21.9 GASTROESOPHAGEAL REFLUX DISEASE WITHOUT ESOPHAGITIS: ICD-10-CM

## 2021-09-10 RX ORDER — LEVOTHYROXINE SODIUM 0.03 MG/1
25 TABLET ORAL DAILY
Qty: 30 TABLET | Refills: 0 | Status: SHIPPED | OUTPATIENT
Start: 2021-09-10 | End: 2021-11-09 | Stop reason: SDUPTHER

## 2021-09-10 RX ORDER — AMLODIPINE BESYLATE 5 MG/1
5 TABLET ORAL DAILY
Qty: 30 TABLET | Refills: 0 | Status: SHIPPED | OUTPATIENT
Start: 2021-09-10 | End: 2021-11-09 | Stop reason: SDUPTHER

## 2021-09-10 RX ORDER — IRBESARTAN 300 MG/1
300 TABLET ORAL DAILY
Qty: 30 TABLET | Refills: 0 | Status: SHIPPED | OUTPATIENT
Start: 2021-09-10 | End: 2021-11-09 | Stop reason: SDUPTHER

## 2021-09-10 RX ORDER — SIMVASTATIN 20 MG
TABLET ORAL
Qty: 30 TABLET | Refills: 0 | Status: SHIPPED | OUTPATIENT
Start: 2021-09-10 | End: 2021-11-09 | Stop reason: SDUPTHER

## 2021-09-10 RX ORDER — PANTOPRAZOLE SODIUM 40 MG/1
40 TABLET, DELAYED RELEASE ORAL DAILY
Qty: 30 TABLET | Refills: 0 | Status: SHIPPED | OUTPATIENT
Start: 2021-09-10 | End: 2021-11-09 | Stop reason: SDUPTHER

## 2021-11-10 ENCOUNTER — OFFICE VISIT (OUTPATIENT)
Dept: FAMILY MEDICINE CLINIC | Facility: CLINIC | Age: 76
End: 2021-11-10

## 2021-11-10 VITALS
TEMPERATURE: 97.1 F | WEIGHT: 197 LBS | RESPIRATION RATE: 14 BRPM | HEART RATE: 68 BPM | HEIGHT: 70 IN | BODY MASS INDEX: 28.2 KG/M2 | DIASTOLIC BLOOD PRESSURE: 78 MMHG | SYSTOLIC BLOOD PRESSURE: 134 MMHG

## 2021-11-10 DIAGNOSIS — I10 BENIGN ESSENTIAL HTN: Primary | ICD-10-CM

## 2021-11-10 DIAGNOSIS — H81.03 MENIERE DISEASE, BILATERAL: Chronic | ICD-10-CM

## 2021-11-10 DIAGNOSIS — J30.1 CHRONIC SEASONAL ALLERGIC RHINITIS DUE TO POLLEN: Chronic | ICD-10-CM

## 2021-11-10 DIAGNOSIS — E78.2 MIXED HYPERLIPIDEMIA: Chronic | ICD-10-CM

## 2021-11-10 DIAGNOSIS — K21.9 GASTROESOPHAGEAL REFLUX DISEASE WITHOUT ESOPHAGITIS: Chronic | ICD-10-CM

## 2021-11-10 DIAGNOSIS — E03.9 ACQUIRED HYPOTHYROIDISM: Chronic | ICD-10-CM

## 2021-11-10 PROCEDURE — 99214 OFFICE O/P EST MOD 30 MIN: CPT | Performed by: FAMILY MEDICINE

## 2021-11-10 RX ORDER — IRBESARTAN 300 MG/1
300 TABLET ORAL DAILY
Qty: 90 TABLET | Refills: 1 | Status: SHIPPED | OUTPATIENT
Start: 2021-11-10 | End: 2022-03-11

## 2021-11-10 RX ORDER — AMLODIPINE BESYLATE 5 MG/1
5 TABLET ORAL DAILY
Qty: 90 TABLET | Refills: 1 | Status: SHIPPED | OUTPATIENT
Start: 2021-11-10 | End: 2022-03-11

## 2021-11-10 RX ORDER — SIMVASTATIN 20 MG
20 TABLET ORAL EVERY EVENING
Qty: 90 TABLET | Refills: 1 | Status: SHIPPED | OUTPATIENT
Start: 2021-11-10 | End: 2022-03-11

## 2021-11-10 RX ORDER — LEVOTHYROXINE SODIUM 0.03 MG/1
25 TABLET ORAL DAILY
Qty: 90 TABLET | Refills: 1 | Status: SHIPPED | OUTPATIENT
Start: 2021-11-10 | End: 2022-03-11

## 2021-11-10 RX ORDER — PANTOPRAZOLE SODIUM 40 MG/1
40 TABLET, DELAYED RELEASE ORAL DAILY
Qty: 90 TABLET | Refills: 1 | Status: SHIPPED | OUTPATIENT
Start: 2021-11-10 | End: 2022-03-11

## 2021-11-10 RX ORDER — DIAZEPAM 2 MG/1
2 TABLET ORAL EVERY 12 HOURS PRN
Qty: 60 TABLET | Refills: 2 | Status: SHIPPED | OUTPATIENT
Start: 2021-11-10 | End: 2022-02-09 | Stop reason: SDUPTHER

## 2021-11-10 RX ORDER — FLUTICASONE PROPIONATE 50 MCG
2 SPRAY, SUSPENSION (ML) NASAL DAILY
Qty: 48 G | Refills: 1 | Status: SHIPPED | OUTPATIENT
Start: 2021-11-10 | End: 2022-05-10 | Stop reason: SDUPTHER

## 2021-11-10 NOTE — PROGRESS NOTES
Subjective   Kali Crespo is a 76 y.o. male.     History of Present Illness     Chief Complaint:   Chief Complaint   Patient presents with   • Hypertension     MED REFILL DUE /  LAB RESULTS    • Hyperlipidemia     meds reviewed with pt today / multiple pharm  per pt    • Hypothyroidism   • Heartburn   • Anxiety   • Arthritis       Kali Crespo 76 y.o. male who presents today for Medical Management of the below listed issues and medication refills. He  has a problem list of   Patient Active Problem List   Diagnosis   • Cervical pain   • Migraine without aura and without status migrainosus, not intractable   • Vertigo   • Benign essential HTN   • Acid reflux   • Encounter for pre-operative cardiovascular clearance   • Abnormal EKG   • Hyperlipidemia   • Erectile dysfunction   • Prostate cancer (HCC)   • Meniere disease, bilateral   • Lumbar degenerative disc disease   • Low back pain   • Malignant neoplastic disease (HCC)   • Alternating esotropia   • Diplopia   • Laceration of extensor muscle, fascia and tendon of right ring finger at wrist and hand level, initial encounter   .  Since the last visit, He has overall felt well.  he has been compliant with   Current Outpatient Medications:   •  amLODIPine (NORVASC) 5 MG tablet, Take 1 tablet by mouth Daily., Disp: 90 tablet, Rfl: 1  •  diazePAM (VALIUM) 2 MG tablet, Take 1 tablet by mouth Every 12 (Twelve) Hours As Needed (vertigo)., Disp: 60 tablet, Rfl: 2  •  fluticasone (FLONASE) 50 MCG/ACT nasal spray, 2 sprays into the nostril(s) as directed by provider Daily., Disp: 48 g, Rfl: 1  •  irbesartan (AVAPRO) 300 MG tablet, Take 1 tablet by mouth Daily., Disp: 90 tablet, Rfl: 1  •  levothyroxine (SYNTHROID, LEVOTHROID) 25 MCG tablet, Take 1 tablet by mouth Daily., Disp: 90 tablet, Rfl: 1  •  pantoprazole (PROTONIX) 40 MG EC tablet, Take 1 tablet by mouth Daily., Disp: 90 tablet, Rfl: 1  •  simvastatin (ZOCOR) 20 MG tablet, Take 1 tablet by mouth Every  "Evening., Disp: 90 tablet, Rfl: 1  •  HYDROcodone-acetaminophen (NORCO) 5-325 MG per tablet, Take 1 tablet by mouth Every 4 (Four) Hours As Needed for Severe Pain  (take for surgery only if pain not controlled by ibuprofen)., Disp: 5 tablet, Rfl: 0  •  HYDROcodone-acetaminophen (NORCO) 7.5-325 MG per tablet, Take 1 tablet by mouth Every 8 (Eight) Hours As Needed., Disp: , Rfl:   •  loratadine (CLARITIN) 10 MG tablet, Take 10 mg by mouth daily., Disp: , Rfl:   •  Multiple Vitamins-Minerals (CENTRAL-KEVON FOR SENIORS PO), Take 1 tablet by mouth Daily. HOLD PER MD INSTR, Disp: , Rfl:   •  Omega-3 Fatty Acids (FISH OIL) 1000 MG capsule capsule, Take 1,000 mg by mouth Daily. HOLD PER MD INSTR, Disp: , Rfl:   •  sildenafil (REVATIO) 20 MG tablet, Take 2-3 tabs QD prn, Disp: 60 tablet, Rfl: 11  •  Thiamine HCl (VITAMIN B-1) 100 MG tablet, Take 500 mg by mouth Daily. HOLD PER MD INSTR, Disp: , Rfl: .  He denies medication side effects.    All of the other chronic condition(s) listed above are stable w/o issues.    /78   Pulse 68   Temp 97.1 °F (36.2 °C) (Oral)   Resp 14   Ht 177.8 cm (70\")   Wt 89.4 kg (197 lb)   BMI 28.27 kg/m²     Results for orders placed or performed in visit on 09/30/21   Basic Metabolic Panel    Specimen: Blood   Result Value Ref Range    Glucose 87 65 - 99 mg/dL    BUN 16 8 - 27 mg/dL    Creatinine 0.90 0.76 - 1.27 mg/dL    eGFR Non African Am 83 >59 mL/min/1.73    eGFR African Am 96 >59 mL/min/1.73    BUN/Creatinine Ratio 18 10 - 24    Sodium 141 134 - 144 mmol/L    Potassium 4.5 3.5 - 5.2 mmol/L    Chloride 104 96 - 106 mmol/L    Total CO2 26 20 - 29 mmol/L    Calcium 9.2 8.6 - 10.2 mg/dL   Lipid Panel    Specimen: Blood   Result Value Ref Range    Total Cholesterol 192 100 - 199 mg/dL    Triglycerides 107 0 - 149 mg/dL    HDL Cholesterol 43 >39 mg/dL    VLDL Cholesterol Farrukh 19 5 - 40 mg/dL    LDL Chol Calc ([facility]) 130 (H) 0 - 99 mg/dL             The following portions of the patient's " history were reviewed and updated as appropriate: allergies, current medications, past family history, past medical history, past social history, past surgical history, and problem list.    Review of Systems   Constitutional: Negative for activity change, chills and fever.   Respiratory: Negative for cough.    Cardiovascular: Negative for chest pain.   Psychiatric/Behavioral: Negative for dysphoric mood.       Objective   Physical Exam  Constitutional:       General: He is not in acute distress.     Appearance: He is well-developed.   Cardiovascular:      Rate and Rhythm: Normal rate and regular rhythm.   Pulmonary:      Effort: Pulmonary effort is normal.      Breath sounds: Normal breath sounds.   Neurological:      Mental Status: He is alert and oriented to person, place, and time.   Psychiatric:         Behavior: Behavior normal.         Thought Content: Thought content normal.     Labs reviewed with pt today during visit. All questions answered.    The patient has read and signed the [facility] Controlled Substance Contract.  I will continue to see patient for regular follow up appointments.  They are well controlled on their medication.  KERVIN has been reviewed by me and is updated every 3 months. The patient is aware of the potential for addiction and dependence.      Assessment/Plan   Diagnoses and all orders for this visit:    1. Benign essential HTN (Primary)  -     amLODIPine (NORVASC) 5 MG tablet; Take 1 tablet by mouth Daily.  Dispense: 90 tablet; Refill: 1  -     irbesartan (AVAPRO) 300 MG tablet; Take 1 tablet by mouth Daily.  Dispense: 90 tablet; Refill: 1    2. Acquired hypothyroidism  -     levothyroxine (SYNTHROID, LEVOTHROID) 25 MCG tablet; Take 1 tablet by mouth Daily.  Dispense: 90 tablet; Refill: 1    3. Gastroesophageal reflux disease without esophagitis  -     pantoprazole (PROTONIX) 40 MG EC tablet; Take 1 tablet by mouth Daily.  Dispense: 90 tablet; Refill: 1    4. Chronic seasonal  allergic rhinitis due to pollen  -     fluticasone (FLONASE) 50 MCG/ACT nasal spray; 2 sprays into the nostril(s) as directed by provider Daily.  Dispense: 48 g; Refill: 1    5. Mixed hyperlipidemia  -     simvastatin (ZOCOR) 20 MG tablet; Take 1 tablet by mouth Every Evening.  Dispense: 90 tablet; Refill: 1    6. Meniere disease, bilateral  -     diazePAM (VALIUM) 2 MG tablet; Take 1 tablet by mouth Every 12 (Twelve) Hours As Needed (vertigo).  Dispense: 60 tablet; Refill: 2

## 2021-11-11 ENCOUNTER — TELEPHONE (OUTPATIENT)
Dept: FAMILY MEDICINE CLINIC | Facility: CLINIC | Age: 76
End: 2021-11-11

## 2021-11-11 NOTE — TELEPHONE ENCOUNTER
Caller: No Crespo    Relationship: Emergency Contact    Best call back number: [phone]     What orders are you requesting (i.e. lab or imaging): BLOOD WORK    In what timeframe would the patient need to come in: WEEK BEFORE APPT ON 05/11/22    Where will you receive your lab/imaging services: OFFICE    Additional notes: PLEASE CALL AND ADVISE WHEN CAN SCHEDULE APPT.

## 2021-11-12 DIAGNOSIS — I10 BENIGN ESSENTIAL HTN: Primary | ICD-10-CM

## 2021-11-12 DIAGNOSIS — Z12.5 SCREENING FOR PROSTATE CANCER: ICD-10-CM

## 2021-11-12 DIAGNOSIS — E03.9 ACQUIRED HYPOTHYROIDISM: ICD-10-CM

## 2022-01-26 ENCOUNTER — OFFICE VISIT (OUTPATIENT)
Dept: FAMILY MEDICINE CLINIC | Facility: CLINIC | Age: 77
End: 2022-01-26

## 2022-01-26 VITALS
DIASTOLIC BLOOD PRESSURE: 74 MMHG | HEART RATE: 68 BPM | WEIGHT: 197 LBS | SYSTOLIC BLOOD PRESSURE: 140 MMHG | HEIGHT: 70 IN | BODY MASS INDEX: 28.2 KG/M2 | TEMPERATURE: 98.4 F | RESPIRATION RATE: 16 BRPM | OXYGEN SATURATION: 100 %

## 2022-01-26 DIAGNOSIS — R06.02 SHORTNESS OF BREATH: Primary | ICD-10-CM

## 2022-01-26 PROCEDURE — 99213 OFFICE O/P EST LOW 20 MIN: CPT

## 2022-01-27 LAB
LABCORP SARS-COV-2, NAA 2 DAY TAT: NORMAL
SARS-COV-2 RNA RESP QL NAA+PROBE: NOT DETECTED

## 2022-02-09 NOTE — PROGRESS NOTES
Subjective   Kali Crespo is a 76 y.o. male.     History of Present Illness     Chief Complaint:   Chief Complaint   Patient presents with   • Meniere's     med refill / pt to schedule med wellness may 2022        Kali Crespo 76 y.o. male who presents today for Medical Management of the below listed issues and medication refills. He  has a problem list of   Patient Active Problem List   Diagnosis   • Cervical pain   • Migraine without aura and without status migrainosus, not intractable   • Vertigo   • Benign essential HTN   • Acid reflux   • Encounter for pre-operative cardiovascular clearance   • Abnormal EKG   • Hyperlipidemia   • Erectile dysfunction   • Prostate cancer (HCC)   • Meniere disease, bilateral   • Lumbar degenerative disc disease   • Low back pain   • Malignant neoplastic disease (HCC)   • Alternating esotropia   • Diplopia   • Laceration of extensor muscle, fascia and tendon of right ring finger at wrist and hand level, initial encounter   • Acquired hypothyroidism   .  Since the last visit, He has overall felt well.  he has been compliant with   Current Outpatient Medications:   •  amLODIPine (NORVASC) 5 MG tablet, Take 1 tablet by mouth Daily., Disp: 90 tablet, Rfl: 1  •  diazePAM (VALIUM) 2 MG tablet, Take 1 tablet by mouth Every 12 (Twelve) Hours As Needed (vertigo)., Disp: 60 tablet, Rfl: 2  •  fluticasone (FLONASE) 50 MCG/ACT nasal spray, 2 sprays into the nostril(s) as directed by provider Daily., Disp: 48 g, Rfl: 1  •  gabapentin (NEURONTIN) 100 MG capsule, Take 100 mg by mouth 3 (Three) Times a Day., Disp: , Rfl:   •  HYDROcodone-acetaminophen (NORCO) 5-325 MG per tablet, Take 1 tablet by mouth Every 4 (Four) Hours As Needed for Severe Pain  (take for surgery only if pain not controlled by ibuprofen)., Disp: 5 tablet, Rfl: 0  •  HYDROcodone-acetaminophen (NORCO) 7.5-325 MG per tablet, Take 1 tablet by mouth Every 8 (Eight) Hours As Needed., Disp: , Rfl:   •  irbesartan  "(AVAPRO) 300 MG tablet, Take 1 tablet by mouth Daily., Disp: 90 tablet, Rfl: 1  •  levothyroxine (SYNTHROID, LEVOTHROID) 25 MCG tablet, Take 1 tablet by mouth Daily., Disp: 90 tablet, Rfl: 1  •  loratadine (CLARITIN) 10 MG tablet, Take 10 mg by mouth daily., Disp: , Rfl:   •  Multiple Vitamins-Minerals (CENTRAL-KEVON FOR SENIORS PO), Take 1 tablet by mouth Daily. HOLD PER MD INSTR, Disp: , Rfl:   •  Omega-3 Fatty Acids (FISH OIL) 1000 MG capsule capsule, Take 1,000 mg by mouth Daily. HOLD PER MD INSTR, Disp: , Rfl:   •  pantoprazole (PROTONIX) 40 MG EC tablet, Take 1 tablet by mouth Daily., Disp: 90 tablet, Rfl: 1  •  sildenafil (REVATIO) 20 MG tablet, Take 2-3 tabs QD prn, Disp: 60 tablet, Rfl: 11  •  simvastatin (ZOCOR) 20 MG tablet, Take 1 tablet by mouth Every Evening., Disp: 90 tablet, Rfl: 1  •  Thiamine HCl (VITAMIN B-1) 100 MG tablet, Take 500 mg by mouth Daily. HOLD PER MD INSTR, Disp: , Rfl: .  He denies medication side effects.    All of the other chronic condition(s) listed above are stable w/o issues.    /74   Pulse 72   Temp 96.9 °F (36.1 °C) (Oral)   Resp 16   Ht 177.8 cm (70\")   Wt 92.1 kg (203 lb)   BMI 29.13 kg/m²     Results for orders placed or performed in visit on 01/26/22   COVID-19,[facility] ROUTINE, NP/OP SWAB IN TRANSPORT MEDIA OR ESWAB 72 HR TAT - Swab, Anterior nasal    Specimen: Anterior nasal; Swab    Swab  Previously teste   Result Value Ref Range    SARS-CoV-2, MARGOTH Not Detected Not Detected   SARS-CoV-2, MARGOTH 2 DAY TAT - ,    Swab  Previously teste   Result Value Ref Range    [facility] SARS-COV-2, MARGOTH 2 DAY TAT Performed              The following portions of the patient's history were reviewed and updated as appropriate: allergies, current medications, past family history, past medical history, past social history, past surgical history, and problem list.    Review of Systems   Constitutional: Negative for activity change, chills and fever.   Respiratory: Negative for cough.  "   Cardiovascular: Negative for chest pain.   Psychiatric/Behavioral: Negative for dysphoric mood.       Objective   Physical Exam  Constitutional:       General: He is not in acute distress.     Appearance: He is well-developed.   Cardiovascular:      Rate and Rhythm: Normal rate and regular rhythm.   Pulmonary:      Effort: Pulmonary effort is normal.      Breath sounds: Normal breath sounds.   Neurological:      Mental Status: He is alert and oriented to person, place, and time.   Psychiatric:         Behavior: Behavior normal.         Thought Content: Thought content normal.           Assessment/Plan   Diagnoses and all orders for this visit:    1. Meniere disease, bilateral (Primary)  -     diazePAM (VALIUM) 2 MG tablet; Take 1 tablet by mouth Every 12 (Twelve) Hours As Needed (vertigo).  Dispense: 60 tablet; Refill: 2

## 2022-02-10 ENCOUNTER — OFFICE VISIT (OUTPATIENT)
Dept: FAMILY MEDICINE CLINIC | Facility: CLINIC | Age: 77
End: 2022-02-10

## 2022-02-10 VITALS
HEART RATE: 72 BPM | RESPIRATION RATE: 16 BRPM | TEMPERATURE: 96.9 F | WEIGHT: 203 LBS | BODY MASS INDEX: 29.06 KG/M2 | HEIGHT: 70 IN | SYSTOLIC BLOOD PRESSURE: 133 MMHG | DIASTOLIC BLOOD PRESSURE: 74 MMHG

## 2022-02-10 DIAGNOSIS — H81.03 MENIERE DISEASE, BILATERAL: Primary | Chronic | ICD-10-CM

## 2022-02-10 PROCEDURE — 99213 OFFICE O/P EST LOW 20 MIN: CPT | Performed by: FAMILY MEDICINE

## 2022-02-10 RX ORDER — DIAZEPAM 2 MG/1
2 TABLET ORAL EVERY 12 HOURS PRN
Qty: 60 TABLET | Refills: 2 | Status: SHIPPED | OUTPATIENT
Start: 2022-02-10 | End: 2022-05-11

## 2022-02-10 RX ORDER — GABAPENTIN 100 MG/1
400 CAPSULE ORAL 3 TIMES DAILY
COMMUNITY
Start: 2022-01-31 | End: 2022-11-16 | Stop reason: DRUGHIGH

## 2022-02-23 ENCOUNTER — APPOINTMENT (OUTPATIENT)
Dept: GENERAL RADIOLOGY | Facility: HOSPITAL | Age: 77
End: 2022-02-23

## 2022-02-23 PROCEDURE — 71101 X-RAY EXAM UNILAT RIBS/CHEST: CPT | Performed by: GENERAL PRACTICE

## 2022-03-09 DIAGNOSIS — E03.9 ACQUIRED HYPOTHYROIDISM: Chronic | ICD-10-CM

## 2022-03-09 DIAGNOSIS — K21.9 GASTROESOPHAGEAL REFLUX DISEASE WITHOUT ESOPHAGITIS: Chronic | ICD-10-CM

## 2022-03-09 DIAGNOSIS — I10 BENIGN ESSENTIAL HTN: ICD-10-CM

## 2022-03-09 DIAGNOSIS — E78.2 MIXED HYPERLIPIDEMIA: Chronic | ICD-10-CM

## 2022-03-10 DIAGNOSIS — E03.9 ACQUIRED HYPOTHYROIDISM: Chronic | ICD-10-CM

## 2022-03-10 DIAGNOSIS — K21.9 GASTROESOPHAGEAL REFLUX DISEASE WITHOUT ESOPHAGITIS: Chronic | ICD-10-CM

## 2022-03-10 DIAGNOSIS — I10 BENIGN ESSENTIAL HTN: ICD-10-CM

## 2022-03-10 DIAGNOSIS — E78.2 MIXED HYPERLIPIDEMIA: Chronic | ICD-10-CM

## 2022-03-10 RX ORDER — AMLODIPINE BESYLATE 5 MG/1
5 TABLET ORAL DAILY
Qty: 90 TABLET | Refills: 3 | OUTPATIENT
Start: 2022-03-10

## 2022-03-10 RX ORDER — SIMVASTATIN 20 MG
TABLET ORAL
Qty: 90 TABLET | Refills: 3 | OUTPATIENT
Start: 2022-03-10

## 2022-03-10 RX ORDER — IRBESARTAN 300 MG/1
300 TABLET ORAL DAILY
Qty: 90 TABLET | Refills: 3 | OUTPATIENT
Start: 2022-03-10

## 2022-03-10 RX ORDER — LEVOTHYROXINE SODIUM 0.03 MG/1
25 TABLET ORAL DAILY
Qty: 90 TABLET | Refills: 3 | OUTPATIENT
Start: 2022-03-10

## 2022-03-10 RX ORDER — PANTOPRAZOLE SODIUM 40 MG/1
40 TABLET, DELAYED RELEASE ORAL DAILY
Qty: 90 TABLET | Refills: 3 | OUTPATIENT
Start: 2022-03-10

## 2022-03-11 RX ORDER — IRBESARTAN 300 MG/1
300 TABLET ORAL DAILY
Qty: 90 TABLET | Refills: 1 | Status: SHIPPED | OUTPATIENT
Start: 2022-03-11 | End: 2022-08-22

## 2022-03-11 RX ORDER — PANTOPRAZOLE SODIUM 40 MG/1
40 TABLET, DELAYED RELEASE ORAL DAILY
Qty: 90 TABLET | Refills: 1 | Status: SHIPPED | OUTPATIENT
Start: 2022-03-11 | End: 2022-08-22

## 2022-03-11 RX ORDER — SIMVASTATIN 20 MG
TABLET ORAL
Qty: 90 TABLET | Refills: 1 | Status: SHIPPED | OUTPATIENT
Start: 2022-03-11 | End: 2022-08-22

## 2022-03-11 RX ORDER — AMLODIPINE BESYLATE 5 MG/1
5 TABLET ORAL DAILY
Qty: 90 TABLET | Refills: 1 | Status: SHIPPED | OUTPATIENT
Start: 2022-03-11 | End: 2022-08-22

## 2022-03-11 RX ORDER — LEVOTHYROXINE SODIUM 0.03 MG/1
25 TABLET ORAL DAILY
Qty: 90 TABLET | Refills: 1 | Status: SHIPPED | OUTPATIENT
Start: 2022-03-11 | End: 2022-08-22

## 2022-04-18 ENCOUNTER — HOSPITAL ENCOUNTER (EMERGENCY)
Facility: HOSPITAL | Age: 77
Discharge: HOME | End: 2022-04-18
Attending: EMERGENCY MEDICINE | Admitting: EMERGENCY MEDICINE

## 2022-04-18 VITALS
BODY MASS INDEX: 29.05 KG/M2 | WEIGHT: 202.9 LBS | OXYGEN SATURATION: 93 % | TEMPERATURE: 95.8 F | HEIGHT: 70 IN | DIASTOLIC BLOOD PRESSURE: 92 MMHG | HEART RATE: 71 BPM | SYSTOLIC BLOOD PRESSURE: 156 MMHG | RESPIRATION RATE: 18 BRPM

## 2022-04-18 DIAGNOSIS — M54.50 LUMBAR PAIN: ICD-10-CM

## 2022-04-18 DIAGNOSIS — M54.16 LUMBAR RADICULOPATHY: Primary | ICD-10-CM

## 2022-04-18 PROCEDURE — 96374 THER/PROPH/DIAG INJ IV PUSH: CPT

## 2022-04-18 PROCEDURE — 99283 EMERGENCY DEPT VISIT LOW MDM: CPT

## 2022-04-18 PROCEDURE — 96375 TX/PRO/DX INJ NEW DRUG ADDON: CPT

## 2022-04-18 PROCEDURE — 25010000002 HYDROMORPHONE 1 MG/ML SOLUTION: Performed by: EMERGENCY MEDICINE

## 2022-04-18 PROCEDURE — 25010000002 METHYLPREDNISOLONE PER 125 MG: Performed by: EMERGENCY MEDICINE

## 2022-04-18 PROCEDURE — 25010000002 KETOROLAC TROMETHAMINE PER 15 MG: Performed by: EMERGENCY MEDICINE

## 2022-04-18 PROCEDURE — 25010000002 ONDANSETRON PER 1 MG: Performed by: EMERGENCY MEDICINE

## 2022-04-18 RX ORDER — KETOROLAC TROMETHAMINE 15 MG/ML
15 INJECTION, SOLUTION INTRAMUSCULAR; INTRAVENOUS ONCE
Status: COMPLETED | OUTPATIENT
Start: 2022-04-18 | End: 2022-04-18

## 2022-04-18 RX ORDER — ONDANSETRON 2 MG/ML
4 INJECTION INTRAMUSCULAR; INTRAVENOUS ONCE
Status: COMPLETED | OUTPATIENT
Start: 2022-04-18 | End: 2022-04-18

## 2022-04-18 RX ORDER — METHYLPREDNISOLONE SODIUM SUCCINATE 125 MG/2ML
125 INJECTION, POWDER, LYOPHILIZED, FOR SOLUTION INTRAMUSCULAR; INTRAVENOUS ONCE
Status: COMPLETED | OUTPATIENT
Start: 2022-04-18 | End: 2022-04-18

## 2022-04-18 RX ORDER — SODIUM CHLORIDE 0.9 % (FLUSH) 0.9 %
10 SYRINGE (ML) INJECTION AS NEEDED
Status: DISCONTINUED | OUTPATIENT
Start: 2022-04-18 | End: 2022-04-18 | Stop reason: HOSPADM

## 2022-04-18 RX ORDER — PREDNISONE 20 MG/1
20 TABLET ORAL 2 TIMES DAILY
Qty: 10 TABLET | Refills: 0 | Status: SHIPPED | OUTPATIENT
Start: 2022-04-18 | End: 2022-04-23

## 2022-04-18 RX ADMIN — HYDROMORPHONE HYDROCHLORIDE 1 MG: 1 INJECTION, SOLUTION INTRAMUSCULAR; INTRAVENOUS; SUBCUTANEOUS at 10:41

## 2022-04-18 RX ADMIN — KETOROLAC TROMETHAMINE 15 MG: 15 INJECTION, SOLUTION INTRAMUSCULAR; INTRAVENOUS at 10:39

## 2022-04-18 RX ADMIN — METHYLPREDNISOLONE SODIUM SUCCINATE 125 MG: 125 INJECTION, POWDER, FOR SOLUTION INTRAMUSCULAR; INTRAVENOUS at 10:36

## 2022-04-18 RX ADMIN — SODIUM CHLORIDE 1000 ML: 9 INJECTION, SOLUTION INTRAVENOUS at 10:35

## 2022-04-18 RX ADMIN — ONDANSETRON 4 MG: 2 INJECTION INTRAMUSCULAR; INTRAVENOUS at 10:36

## 2022-04-18 NOTE — ED PROVIDER NOTES
EMERGENCY DEPARTMENT ENCOUNTER    Room Number:  38/38  Date of encounter:  4/18/2022  PCP: Nathan Arango MD  Historian: Patient and spouse      HPI:  Chief Complaint: Back pain  A complete HPI/ROS/PMH/PSH/SH/FH are unobtainable due to: Not applicable  Context: Kali Crespo is a 76 y.o. male who presents to the ED c/o patient has a long history of back pain.  He goes to [facility] pain management.  About 2 weeks ago he had a procedure in their office consisted of ablation and some epidural injections.  Since that time he has had pain in his lower lumbar sacral area just left of midline.  Pain radiates to his left buttocks into the lateral aspect of his left leg and stops at his knee.  Pain is worse with any sort of movement such as bending, twisting.  Denies any urinary or fecal incontinence or retention.  Denies any fever.  Denies any weakness to his legs or any new sensory change.  He has a history of chronic back pain and he also does have a history of chronic sciatica pain bilaterally and will get injections for that as well.  He is on hydrocodone 5/325 in which she takes 3-4 times a day.  He is on gabapentin as well.  A little over a week ago he did a Medrol Dosepak and he is completed that.  His symptoms have persisted.  He called [facility] pain management and they referred him to the ER.  They also referred him to the ER at the end of last week..  Patient is uncertain of the exact date.        Previous Episodes: Long history of chronic back pain and sciatica pain with radiculopathy  Current Symptoms: See above    MEDICAL HISTORY REVIEWED  Patient has a history of lumbar radiculopathy that is longstanding.  He has degenerative disc disease and sees pain management.      PAST MEDICAL HISTORY  Active Ambulatory Problems     Diagnosis Date Noted   • Cervical pain 01/25/2016   • Migraine without aura and without status migrainosus, not intractable 01/25/2016   • Vertigo 01/25/2016   • Benign essential  HTN 01/25/2016   • Acid reflux 01/25/2016   • Encounter for pre-operative cardiovascular clearance 01/25/2016   • Abnormal EKG 01/25/2016   • Hyperlipidemia 04/11/2016   • Erectile dysfunction 10/11/2016   • Prostate cancer (HCC) 10/11/2016   • Meniere disease, bilateral 11/27/2017   • Lumbar degenerative disc disease 04/30/2019   • Low back pain 10/10/2019   • Malignant neoplastic disease (HCC) 10/10/2019   • 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   • Diplopia 01/13/2016   • Laceration of extensor muscle, fascia and tendon of right ring finger at wrist and hand level, initial encounter 06/10/2021   • Acquired hypothyroidism 11/12/2021     Resolved Ambulatory Problems     Diagnosis Date Noted   • No Resolved Ambulatory Problems     Past Medical History:   Diagnosis Date   • Anxiety    • DDD (degenerative disc disease), cervical    • DDD (degenerative disc disease), lumbosacral    • GERD (gastroesophageal reflux disease)    • H/O complete eye exam 07/2017   • Headache    • Hypertension    • Hyperthyroidism    • Measles    • Meniere disease    • Mumps          PAST SURGICAL HISTORY  Past Surgical History:   Procedure Laterality Date   • CATARACT EXTRACTION     • COLONOSCOPY     • EYE SURGERY     • KNEE SURGERY     • PROSTATE SURGERY     • SINUS SURGERY     • TONSILLECTOMY     • TRIGGER FINGER RELEASE Left 6/11/2021    Procedure: LEFT RING FINGER EXTENSOR TENDON REPAIR;  Surgeon: David Leonard MD;  Location: [facility];  Service: Orthopedics;  Laterality: Left;         FAMILY HISTORY  Family History   Problem Relation Age of Onset   • Heart disease Mother    • Stroke Mother    • Cancer Father         prostate   • Migraines Sister    • Malig Hyperthermia Neg Hx          SOCIAL HISTORY  Social History     Socioeconomic History   • Marital status:    Tobacco Use   • Smoking status: Former Smoker     Packs/day: 0.25     Years: 4.00     Pack years: 1.00     Types: Cigarettes     Start date: 1965     Quit date:  1969     Years since quittin.3   • Smokeless tobacco: Never Used   • Tobacco comment: 40 years ago   Substance and Sexual Activity   • Alcohol use: No     Comment: seldom   • Drug use: No   • Sexual activity: Defer         ALLERGIES  Amoxicillin, Astemizole, Codeine sulfate, Penicillins, Phenylephrine, Pyrilamine, and Sulfa antibiotics        REVIEW OF SYSTEMS  Review of Systems     All systems reviewed and negative except for those discussed in HPI.       PHYSICAL EXAM    I have reviewed the triage vital signs and nursing notes.    ED Triage Vitals   Temp Heart Rate Resp BP SpO2   22 0917 22 0917 22 0917 22 0949 2217   95.8 °F (35.4 °C) 111 18 145/90 94 %      Temp src Heart Rate Source Patient Position BP Location FiO2 (%)   -- -- 22 0949 2249 --     Lying Left arm        GENERAL: Elderly male this pleasant no acute distress.Vital signs on my initial evaluation unremarkable.  On my exam his heart rate is normal.  HENT: nares patent  Head/neck/ face are symmetric without gross deformity, signs of trauma, or swelling  EYES: no scleral icterus, no conjunctival pallor.  NECK: Supple, no meningismus  CV: regular rhythm, regular rate with intact distal pulses.  RESPIRATORY: normal effort and no respiratory distress.  ABDOMEN: soft and nontender.  Line back exam:.  Location of pain is the lower lumbar region just left of midline in the lower lumbar sacral region.  Radiates to the left buttocks and down the lateral aspect of the left hip and stops laterally at the knee.  Pain is reproducible when he tries to roll over, tries to sit up, tries to stand, or raising his left leg.  He has normal saddle sensation and normal rectal tone.  After standing the patient up it is also recent reproducible with flexion and extension at his back.  MUSCULOSKELETAL: no deformity.  5/5 strength to hip flexion, knee extension/flexion, dorsiflexion, plantarflexion, and EHL.  No pain with  bilateral external and internal rotation of hips.  Intact distal pulses with no cyanosis, pallor, or temperature change on palpation. Normal inspection and palpation with soft compartments.  SILT at bilateral superficial peroneal, deep peroneal, sural, and saphenous nerves  Does have reproducible pain with raising his left leg.  Crossover is negative.  Patient is able to stand and walk.  No foot drop.  NEURO: alert and appropriate, moves all extremities, follows commands.  No focal motor or sensory changes.  SKIN: warm, dry    Vital signs and nursing notes reviewed.        LAB RESULTS  No results found for this or any previous visit (from the past 24 hour(s)).    Ordered the above labs and independently reviewed the results.        RADIOLOGY  No Radiology Exams Resulted Within Past 24 Hours    I ordered the above noted radiological studies. Reviewed by me and discussed with radiologist.  See dictation for official radiology interpretation.      PROCEDURES    Procedures      MEDICATIONS GIVEN IN ER    Medications   sodium chloride 0.9 % bolus 1,000 mL (0 mL Intravenous Stopped 4/18/22 1246)   HYDROmorphone (DILAUDID) injection 1 mg (1 mg Intravenous Given 4/18/22 1041)   ondansetron (ZOFRAN) injection 4 mg (4 mg Intravenous Given 4/18/22 1036)   ketorolac (TORADOL) injection 15 mg (15 mg Intravenous Given 4/18/22 1039)   methylPREDNISolone sodium succinate (SOLU-Medrol) injection 125 mg (125 mg Intravenous Given 4/18/22 1036)         PROGRESS, DATA ANALYSIS, CONSULTS, AND MEDICAL DECISION MAKING    This patient has acute on chronic exacerbation of his chronic lumbosacral pain.  I will give him a dose of IV pain medicine and steroids here.  I will then reassess him.  He will need to follow-up with his pain management doctor.  I will also give him a neurosurgeon to follow-up with if he is not successful in following up with pain management doctor.  We are currently under a pandemic from the COVID19 infection.  The  patient presented to the emergency department by ambulance or personal vehicle. I followed the current protocols required by Infection Control at [facility] in my evaluation and treatment of the patient. The patient was wearing a face mask during my evaluation and throughout my encounter. During my whole encounter with this patient I used appropriate personal protective equipment.  This equipment consisted of eye protection, facemask, gown, and gloves.  I applied this equipment before entering the room.      All labs have been independently reviewed by me.  All radiology studies have been reviewed by me and discussed with radiologist dictating the report.   EKG's independently viewed and interpreted by me.  Discussion below represents my analysis of pertinent findings related to patient's condition, differential diagnosis, treatment plan and final disposition.      ED Course as of 04/18/22 1628   Mon Apr 18, 2022   1239 Patient is doing better.  Pain is down to about a 3 out of 10.  Informed him of reasons he needs to return to the emergency department such as any incontinence, weakness, fever, or intractable pain.  All questions answered. [MM]   1243 Patient was evaluated with an MRI about 1 or 2 years ago.  He was informed that his back pain was nonoperable.  That is why he is referred to [facility] pain management.  He does not need any emergent MRI here in the emergency department at this time.  I explained this to the patient and the spouse. [MM]      ED Course User Index  [MM] Micah Silva MD       AS OF 16:28 EDT VITALS:    BP - 156/92  HR - 71  TEMP - 95.8 °F (35.4 °C)  02 SATS - 93%        DIAGNOSIS  Final diagnoses:   Lumbar radiculopathy   Lumbar pain: Acute on chronic         DISPOSITION  DISCHARGE    Patient discharged in stable condition.    Reviewed implications of results, diagnosis, meds, responsibility to follow up, warning signs and symptoms of possible worsening, potential  complications and reasons to return to ER, including worsening of symptoms, worsening of pain, fever, urinary or fecal incontinence or retention, weakness to lower extremities, or any concerns..    Patient/Family voiced understanding of above instructions.    Discussed plan for discharge, as there is no emergent indication for admission. Pt/family is agreeable and understands need for follow up and repeat testing.  Pt is aware that discharge does not mean that nothing is wrong but it indicates no emergency is present that requires admission and they must continue care with follow-up as given below or physician of their choice.     FOLLOW-UP  David Guerrero MD  [address] [address]  [phone]      Return immediately if any urinary or fecal incontinence or retention, fever, any new weakness to lower extremity, intractable pain, or any concerns.         Medication List      New Prescriptions    predniSONE 20 MG tablet  Commonly known as: DELTASONE  Take 1 tablet by mouth 2 (Two) Times a Day for 5 days.           Where to Get Your Medications      You can get these medications from any pharmacy    Bring a paper prescription for each of these medications  · predniSONE 20 MG tablet                 DICTATED UTILIZING DRAGON DICTATION     Micah Silva MD  04/18/22 0035

## 2022-04-18 NOTE — ED TRIAGE NOTES
"All triage performed with this RN wearing appropriate PPE.  Pt placed in mask upon arrival to ED.  Patient c/o left sided back pain radiating down leg since having injections. He reports he had \"ablations\" on 4-5-22 at [facility].   "

## 2022-04-18 NOTE — DISCHARGE INSTRUCTIONS
As we discussed make sure you follow-up with [facility] pain clinic who is your pain management doctor.  Continue with pain medicine as prescribed.  Take prednisone as prescribed.  Return immediately if you develop any urinary or fecal incontinence or retention, any new weakness to lower extremities, any fever, or any concerns.

## 2022-04-19 ENCOUNTER — TRANSCRIBE ORDERS (OUTPATIENT)
Dept: ADMINISTRATIVE | Facility: HOSPITAL | Age: 77
End: 2022-04-19

## 2022-04-19 DIAGNOSIS — M54.16 LUMBAR RADICULOPATHY: Primary | ICD-10-CM

## 2022-05-01 ENCOUNTER — HOSPITAL ENCOUNTER (OUTPATIENT)
Dept: MRI IMAGING | Facility: HOSPITAL | Age: 77
Discharge: HOME | End: 2022-05-01
Admitting: NURSE PRACTITIONER

## 2022-05-01 DIAGNOSIS — M54.16 LUMBAR RADICULOPATHY: ICD-10-CM

## 2022-05-01 PROCEDURE — 72148 MRI LUMBAR SPINE W/O DYE: CPT

## 2022-05-10 RX ORDER — SIMVASTATIN 20 MG
20 TABLET ORAL DAILY
Qty: 90 TABLET | Refills: 1 | Status: CANCELLED | OUTPATIENT
Start: 2022-05-10

## 2022-05-10 RX ORDER — AMLODIPINE BESYLATE 5 MG/1
5 TABLET ORAL DAILY
Qty: 90 TABLET | Refills: 1 | Status: CANCELLED | OUTPATIENT
Start: 2022-05-10

## 2022-05-10 RX ORDER — LEVOTHYROXINE SODIUM 0.03 MG/1
25 TABLET ORAL DAILY
Qty: 90 TABLET | Refills: 1 | Status: CANCELLED | OUTPATIENT
Start: 2022-05-10

## 2022-05-10 RX ORDER — PANTOPRAZOLE SODIUM 40 MG/1
40 TABLET, DELAYED RELEASE ORAL DAILY
Qty: 90 TABLET | Refills: 1 | Status: CANCELLED | OUTPATIENT
Start: 2022-05-10

## 2022-05-10 RX ORDER — IRBESARTAN 300 MG/1
300 TABLET ORAL DAILY
Qty: 90 TABLET | Refills: 1 | Status: CANCELLED | OUTPATIENT
Start: 2022-05-10

## 2022-05-10 RX ORDER — DIAZEPAM 2 MG/1
2 TABLET ORAL EVERY 12 HOURS PRN
Qty: 60 TABLET | Refills: 2 | Status: CANCELLED | OUTPATIENT
Start: 2022-05-10

## 2022-05-10 NOTE — PROGRESS NOTES
Subjective   Kali Crespo is a 76 y.o. male.     History of Present Illness     Chief Complaint:   Chief Complaint   Patient presents with   • Hypertension     Med refill due /  lab results    • Hyperlipidemia     To discuss medications per pt    • Hypothyroidism   • Anxiety   • Arthritis   • MEDICARE WELLNESS      DUE       Kali Crespo 76 y.o. male who presents today for Medical Management of the below listed issues. He  has a problem list of   Patient Active Problem List   Diagnosis   • Cervical pain   • Migraine without aura and without status migrainosus, not intractable   • Vertigo   • Benign essential HTN   • Acid reflux   • Encounter for pre-operative cardiovascular clearance   • Abnormal EKG   • Hyperlipidemia   • Erectile dysfunction   • Prostate cancer (HCC)   • Meniere disease, bilateral   • Lumbar degenerative disc disease   • Low back pain   • Malignant neoplastic disease (HCC)   • Alternating esotropia   • Diplopia   • Laceration of extensor muscle, fascia and tendon of right ring finger at wrist and hand level, initial encounter   • Acquired hypothyroidism   .  Since the last visit, He has overall felt well.  he has been compliant with   Current Outpatient Medications:   •  amLODIPine (NORVASC) 5 MG tablet, TAKE 1 TABLET BY MOUTH  DAILY, Disp: 90 tablet, Rfl: 1  •  fluticasone (FLONASE) 50 MCG/ACT nasal spray, 2 sprays into the nostril(s) as directed by provider Daily., Disp: 48 g, Rfl: 1  •  irbesartan (AVAPRO) 300 MG tablet, TAKE 1 TABLET BY MOUTH  DAILY, Disp: 90 tablet, Rfl: 1  •  levothyroxine (SYNTHROID, LEVOTHROID) 25 MCG tablet, TAKE 1 TABLET BY MOUTH  DAILY, Disp: 90 tablet, Rfl: 1  •  pantoprazole (PROTONIX) 40 MG EC tablet, TAKE 1 TABLET BY MOUTH  DAILY, Disp: 90 tablet, Rfl: 1  •  simvastatin (ZOCOR) 20 MG tablet, TAKE 1 TABLET BY MOUTH IN  THE EVENING, Disp: 90 tablet, Rfl: 1  •  EPINEPHrine (EpiPen 2-Evan) 0.3 MG/0.3ML solution auto-injector injection, Inject 0.3 mL into the  "appropriate muscle as directed by prescriber 1 (One) Time for 1 dose., Disp: 1 each, Rfl: 1  •  gabapentin (NEURONTIN) 100 MG capsule, Take 100 mg by mouth 3 (Three) Times a Day., Disp: , Rfl:   •  HYDROcodone-acetaminophen (NORCO) 7.5-325 MG per tablet, Every 6 (Six) Hours., Disp: , Rfl:   •  loratadine (CLARITIN) 10 MG tablet, Take 10 mg by mouth daily., Disp: , Rfl:   •  Multiple Vitamins-Minerals (CENTRAL-KEVON FOR SENIORS PO), Take 1 tablet by mouth Daily. HOLD PER MD INSTR, Disp: , Rfl:   •  Omega-3 Fatty Acids (FISH OIL) 1000 MG capsule capsule, Take 1,000 mg by mouth Daily. HOLD PER MD INSTR, Disp: , Rfl:   •  sildenafil (REVATIO) 20 MG tablet, Take 2-3 tabs QD prn, Disp: 60 tablet, Rfl: 11  •  Thiamine HCl (VITAMIN B-1) 100 MG tablet, Take 500 mg by mouth Daily. HOLD PER MD INSTR, Disp: , Rfl: .  He denies medication side effects.    All of the other chronic condition(s) listed above are stable w/o issues.    /76   Pulse 78   Temp 97.9 °F (36.6 °C) (Oral)   Ht 177.8 cm (70\")   Wt 91.6 kg (202 lb)   BMI 28.98 kg/m²     Results for orders placed or performed in visit on 04/12/22   Comprehensive metabolic panel    Specimen: Blood   Result Value Ref Range    Glucose 83 65 - 99 mg/dL    BUN 22 8 - 27 mg/dL    Creatinine 0.99 0.76 - 1.27 mg/dL    EGFR Result 79 >59 mL/min/1.73    BUN/Creatinine Ratio 22 10 - 24    Sodium 139 134 - 144 mmol/L    Potassium 4.4 3.5 - 5.2 mmol/L    Chloride 103 96 - 106 mmol/L    Total CO2 22 20 - 29 mmol/L    Calcium 9.0 8.6 - 10.2 mg/dL    Total Protein 5.7 (L) 6.0 - 8.5 g/dL    Albumin 3.8 3.7 - 4.7 g/dL    Globulin 1.9 1.5 - 4.5 g/dL    A/G Ratio 2.0 1.2 - 2.2    Total Bilirubin 0.5 0.0 - 1.2 mg/dL    Alkaline Phosphatase 80 44 - 121 IU/L    AST (SGOT) 19 0 - 40 IU/L    ALT (SGPT) 26 0 - 44 IU/L   Lipid panel    Specimen: Blood   Result Value Ref Range    Total Cholesterol 218 (H) 100 - 199 mg/dL    Triglycerides 100 0 - 149 mg/dL    HDL Cholesterol 56 >39 mg/dL    VLDL " Cholesterol Farrukh 18 5 - 40 mg/dL    LDL Chol Calc ([facility]) 144 (H) 0 - 99 mg/dL   TSH    Specimen: Blood   Result Value Ref Range    TSH 2.890 0.450 - 4.500 uIU/mL   PSA    Specimen: Blood   Result Value Ref Range    PSA <0.1 0.0 - 4.0 ng/mL   T4, Free    Specimen: Blood   Result Value Ref Range    Free T4 1.28 0.82 - 1.77 ng/dL   CBC and Differential    Specimen: Blood   Result Value Ref Range    WBC 7.2 3.4 - 10.8 x10E3/uL    RBC 4.51 4.14 - 5.80 x10E6/uL    Hemoglobin 14.2 13.0 - 17.7 g/dL    Hematocrit 41.9 37.5 - 51.0 %    MCV 93 79 - 97 fL    MCH 31.5 26.6 - 33.0 pg    MCHC 33.9 31.5 - 35.7 g/dL    RDW 13.5 11.6 - 15.4 %    Platelets 213 150 - 450 x10E3/uL    Neutrophil Rel % 65 Not Estab. %    Lymphocyte Rel % 24 Not Estab. %    Monocyte Rel % 6 Not Estab. %    Eosinophil Rel % 3 Not Estab. %    Basophil Rel % 1 Not Estab. %    Neutrophils Absolute 4.6 1.4 - 7.0 x10E3/uL    Lymphocytes Absolute 1.7 0.7 - 3.1 x10E3/uL    Monocytes Absolute 0.5 0.1 - 0.9 x10E3/uL    Eosinophils Absolute 0.2 0.0 - 0.4 x10E3/uL    Basophils Absolute 0.1 0.0 - 0.2 x10E3/uL    Immature Granulocyte Rel % 1 Not Estab. %    Immature Grans Absolute 0.1 0.0 - 0.1 x10E3/uL             The following portions of the patient's history were reviewed and updated as appropriate: allergies, current medications, past family history, past medical history, past social history, past surgical history, and problem list.    Review of Systems   Constitutional: Negative for activity change, chills and fever.   Respiratory: Negative for cough.    Cardiovascular: Negative for chest pain.   Psychiatric/Behavioral: Negative for dysphoric mood.       Objective   Physical Exam  Constitutional:       General: He is not in acute distress.     Appearance: He is well-developed.   Cardiovascular:      Rate and Rhythm: Normal rate and regular rhythm.   Pulmonary:      Effort: Pulmonary effort is normal.      Breath sounds: Normal breath sounds.   Neurological:       Mental Status: He is alert and oriented to person, place, and time.   Psychiatric:         Behavior: Behavior normal.         Thought Content: Thought content normal.     Labs reviewed with pt today during visit. All questions answered.          Diagnoses and all orders for this visit:    1. Medicare annual wellness visit, subsequent (Primary)    2. Chronic seasonal allergic rhinitis due to pollen  -     fluticasone (FLONASE) 50 MCG/ACT nasal spray; 2 sprays into the nostril(s) as directed by provider Daily.  Dispense: 48 g; Refill: 1    Other orders  -     EPINEPHrine (EpiPen 2-Evan) 0.3 MG/0.3ML solution auto-injector injection; Inject 0.3 mL into the appropriate muscle as directed by prescriber 1 (One) Time for 1 dose.  Dispense: 1 each; Refill: 1

## 2022-05-11 ENCOUNTER — OFFICE VISIT (OUTPATIENT)
Dept: FAMILY MEDICINE CLINIC | Facility: CLINIC | Age: 77
End: 2022-05-11

## 2022-05-11 VITALS
BODY MASS INDEX: 28.92 KG/M2 | SYSTOLIC BLOOD PRESSURE: 130 MMHG | HEIGHT: 70 IN | HEART RATE: 78 BPM | DIASTOLIC BLOOD PRESSURE: 76 MMHG | WEIGHT: 202 LBS | TEMPERATURE: 97.9 F

## 2022-05-11 DIAGNOSIS — Z00.00 MEDICARE ANNUAL WELLNESS VISIT, SUBSEQUENT: Primary | ICD-10-CM

## 2022-05-11 DIAGNOSIS — J30.1 CHRONIC SEASONAL ALLERGIC RHINITIS DUE TO POLLEN: Chronic | ICD-10-CM

## 2022-05-11 PROCEDURE — 1125F AMNT PAIN NOTED PAIN PRSNT: CPT | Performed by: FAMILY MEDICINE

## 2022-05-11 PROCEDURE — 99213 OFFICE O/P EST LOW 20 MIN: CPT | Performed by: FAMILY MEDICINE

## 2022-05-11 PROCEDURE — G0439 PPPS, SUBSEQ VISIT: HCPCS | Performed by: FAMILY MEDICINE

## 2022-05-11 PROCEDURE — 1159F MED LIST DOCD IN RCRD: CPT | Performed by: FAMILY MEDICINE

## 2022-05-11 PROCEDURE — 1170F FXNL STATUS ASSESSED: CPT | Performed by: FAMILY MEDICINE

## 2022-05-11 RX ORDER — EPINEPHRINE 0.3 MG/.3ML
0.3 INJECTION SUBCUTANEOUS ONCE
Qty: 1 EACH | Refills: 1 | Status: SHIPPED | OUTPATIENT
Start: 2022-05-11 | End: 2022-05-11

## 2022-05-11 RX ORDER — HYDROCODONE BITARTRATE AND ACETAMINOPHEN 7.5; 325 MG/1; MG/1
TABLET ORAL EVERY 6 HOURS SCHEDULED
COMMUNITY
End: 2022-06-04 | Stop reason: HOSPADM

## 2022-05-11 RX ORDER — FLUTICASONE PROPIONATE 50 MCG
2 SPRAY, SUSPENSION (ML) NASAL DAILY
Qty: 48 G | Refills: 1 | Status: SHIPPED | OUTPATIENT
Start: 2022-05-11 | End: 2022-07-14

## 2022-05-11 NOTE — PROGRESS NOTES
The ABCs of the Annual Wellness Visit  Subsequent Medicare Wellness Visit    Chief Complaint   Patient presents with   • Hypertension     Med refill due /  lab results    • Hyperlipidemia     To discuss medications per pt    • Hypothyroidism   • Anxiety   • Arthritis   • MEDICARE WELLNESS      DUE      Subjective    History of Present Illness:  Kali Crespo is a 76 y.o. male who presents for a Subsequent Medicare Wellness Visit.    The following portions of the patient's history were reviewed and   updated as appropriate: allergies, current medications, past family history, past medical history, past social history, past surgical history and problem list.    Compared to one year ago, the patient feels his physical   health is the same.    Compared to one year ago, the patient feels his mental   health is the same.    Recent Hospitalizations:  He was not admitted to the hospital during the last year.       Current Medical Providers:  Patient Care Team:  Nathan Arango MD as PCP - General  Nathan Arango MD as PCP - Family Medicine  Nena Ramey APRN (Dermatology)    Outpatient Medications Prior to Visit   Medication Sig Dispense Refill   • amLODIPine (NORVASC) 5 MG tablet TAKE 1 TABLET BY MOUTH  DAILY 90 tablet 1   • irbesartan (AVAPRO) 300 MG tablet TAKE 1 TABLET BY MOUTH  DAILY 90 tablet 1   • levothyroxine (SYNTHROID, LEVOTHROID) 25 MCG tablet TAKE 1 TABLET BY MOUTH  DAILY 90 tablet 1   • pantoprazole (PROTONIX) 40 MG EC tablet TAKE 1 TABLET BY MOUTH  DAILY 90 tablet 1   • simvastatin (ZOCOR) 20 MG tablet TAKE 1 TABLET BY MOUTH IN  THE EVENING 90 tablet 1   • diazePAM (VALIUM) 2 MG tablet Take 1 tablet by mouth Every 12 (Twelve) Hours As Needed (vertigo). 60 tablet 2   • gabapentin (NEURONTIN) 100 MG capsule Take 100 mg by mouth 3 (Three) Times a Day.     • HYDROcodone-acetaminophen (NORCO) 7.5-325 MG per tablet Every 6 (Six) Hours.     • loratadine (CLARITIN) 10 MG tablet Take 10 mg by mouth daily.      • Multiple Vitamins-Minerals (CENTRAL-KEVON FOR SENIORS PO) Take 1 tablet by mouth Daily. HOLD PER MD INSTR     • Omega-3 Fatty Acids (FISH OIL) 1000 MG capsule capsule Take 1,000 mg by mouth Daily. HOLD PER MD INSTR     • sildenafil (REVATIO) 20 MG tablet Take 2-3 tabs QD prn 60 tablet 11   • Thiamine HCl (VITAMIN B-1) 100 MG tablet Take 500 mg by mouth Daily. HOLD PER MD INSTR     • fluticasone (FLONASE) 50 MCG/ACT nasal spray 2 sprays into the nostril(s) as directed by provider Daily. 48 g 1   • HYDROcodone-acetaminophen (NORCO) 5-325 MG per tablet Take 1 tablet by mouth Every 4 (Four) Hours As Needed for Severe Pain  (take for surgery only if pain not controlled by ibuprofen). 5 tablet 0   • HYDROcodone-acetaminophen (NORCO) 7.5-325 MG per tablet Take 1 tablet by mouth Every 8 (Eight) Hours As Needed.       No facility-administered medications prior to visit.       Opioid medication/s are on active medication list.  and I have evaluated his active treatment plan and pain score trends (see table).  Vitals:    05/11/22 0738   PainSc:   8     I have reviewed the chart for potential of high risk medication and harmful drug interactions in the elderly.            Aspirin is not on active medication list.  Aspirin use is not indicated based on review of current medical condition/s. Risk of harm outweighs potential benefits.  .    Patient Active Problem List   Diagnosis   • Cervical pain   • Migraine without aura and without status migrainosus, not intractable   • Vertigo   • Benign essential HTN   • Acid reflux   • Encounter for pre-operative cardiovascular clearance   • Abnormal EKG   • Hyperlipidemia   • Erectile dysfunction   • Prostate cancer (HCC)   • Meniere disease, bilateral   • Lumbar degenerative disc disease   • Low back pain   • Malignant neoplastic disease (HCC)   • Alternating esotropia   • Diplopia   • Laceration of extensor muscle, fascia and tendon of right ring finger at wrist and hand level,  "initial encounter   • Acquired hypothyroidism     Advance Care Planning  Advance Directive is not on file.  ACP discussion was held with the patient during this visit. Patient has an advance directive (not in EMR), copy requested.          Objective    Vitals:    22 0738   BP: 130/76   Pulse: 78   Temp: 97.9 °F (36.6 °C)   TempSrc: Oral   Weight: 91.6 kg (202 lb)   Height: 177.8 cm (70\")   PainSc:   8     BMI Readings from Last 1 Encounters:   22 28.98 kg/m²   BMI is above normal parameters. Recommendations include: exercise counseling    Does the patient have evidence of cognitive impairment? No    Physical Exam  Lab Results   Component Value Date    CHLPL 218 (H) 2022    TRIG 100 2022    HDL 56 2022     (H) 2022    VLDL 18 2022            HEALTH RISK ASSESSMENT    Smoking Status:  Social History     Tobacco Use   Smoking Status Former Smoker   • Packs/day: 0.25   • Years: 4.00   • Pack years: 1.00   • Types: Cigarettes   • Start date:    • Quit date:    • Years since quittin.3   Smokeless Tobacco Never Used   Tobacco Comment    40 years ago     Alcohol Consumption:  Social History     Substance and Sexual Activity   Alcohol Use No    Comment: seldom     Fall Risk Screen:    SHEILA Fall Risk Assessment has not been completed.    Depression Screening:  PHQ-2/PHQ-9 Depression Screening 2022   Retired PHQ-9 Total Score -   Retired Total Score -   Little Interest or Pleasure in Doing Things 0-->not at all   Feeling Down, Depressed or Hopeless 0-->not at all   PHQ-9: Brief Depression Severity Measure Score 0       Health Habits and Functional and Cognitive Screening:  Functional & Cognitive Status 2022   Do you have difficulty preparing food and eating? No   Do you have difficulty bathing yourself, getting dressed or grooming yourself? No   Do you have difficulty using the toilet? No   Do you have difficulty moving around from place to place? No "   Do you have trouble with steps or getting out of a bed or a chair? No   Current Diet Well Balanced Diet   Dental Exam Up to date   Eye Exam Not up to date   Exercise (times per week) 0 times per week   Current Exercises Include No Regular Exercise   Current Exercise Activities Include -   Do you need help using the phone?  No   Are you deaf or do you have serious difficulty hearing?  No   Do you need help with transportation? No   Do you need help shopping? No   Do you need help preparing meals?  No   Do you need help with housework?  No   Do you need help with laundry? No   Do you need help taking your medications? No   Do you need help managing money? No   Do you ever drive or ride in a car without wearing a seat belt? No   Have you felt unusual stress, anger or loneliness in the last month? No   Who do you live with? Spouse   If you need help, do you have trouble finding someone available to you? Yes   Have you been bothered in the last four weeks by sexual problems? No   Do you have difficulty concentrating, remembering or making decisions? No       Age-appropriate Screening Schedule:  Refer to the list below for future screening recommendations based on patient's age, sex and/or medical conditions. Orders for these recommended tests are listed in the plan section. The patient has been provided with a written plan.    Health Maintenance   Topic Date Due   • INFLUENZA VACCINE  08/01/2022   • PROSTATE CANCER SCREENING  05/02/2023   • LIPID PANEL  05/02/2023   • ZOSTER VACCINE  Completed   • TDAP/TD VACCINES  Discontinued              Assessment & Plan   CMS Preventative Services Quick Reference  Risk Factors Identified During Encounter  Cardiovascular Disease  The above risks/problems have been discussed with the patient.  Follow up actions/plans if indicated are seen below in the Assessment/Plan Section.  Pertinent information has been shared with the patient in the After Visit Summary.    Diagnoses and all  orders for this visit:    1. Medicare annual wellness visit, subsequent (Primary)    2. Chronic seasonal allergic rhinitis due to pollen  -     fluticasone (FLONASE) 50 MCG/ACT nasal spray; 2 sprays into the nostril(s) as directed by provider Daily.  Dispense: 48 g; Refill: 1    Other orders  -     EPINEPHrine (EpiPen 2-Evan) 0.3 MG/0.3ML solution auto-injector injection; Inject 0.3 mL into the appropriate muscle as directed by prescriber 1 (One) Time for 1 dose.  Dispense: 1 each; Refill: 1        Follow Up:   Return in about 6 months (around 11/11/2022) for Recheck.     An After Visit Summary and PPPS were made available to the patient.        I spent 10 minutes caring for Kali on this date of service. This time includes time spent by me in the following activities:preparing for the visit

## 2022-05-29 ENCOUNTER — HOSPITAL ENCOUNTER (INPATIENT)
Facility: HOSPITAL | Age: 77
LOS: 2 days | Discharge: HOME | End: 2022-06-04
Attending: EMERGENCY MEDICINE | Admitting: INTERNAL MEDICINE

## 2022-05-29 DIAGNOSIS — M54.59 INTRACTABLE LOW BACK PAIN: ICD-10-CM

## 2022-05-29 LAB
ALBUMIN SERPL-MCNC: 3.8 G/DL (ref 3.5–5.2)
ALBUMIN/GLOB SERPL: 1.6 G/DL
ALP SERPL-CCNC: 86 U/L (ref 39–117)
ALT SERPL W P-5'-P-CCNC: 24 U/L (ref 1–41)
ANION GAP SERPL CALCULATED.3IONS-SCNC: 12 MMOL/L (ref 5–15)
APTT PPP: 25 SECONDS (ref 22.7–35.4)
AST SERPL-CCNC: 18 U/L (ref 1–40)
BASOPHILS # BLD AUTO: 0.04 10*3/MM3 (ref 0–0.2)
BASOPHILS NFR BLD AUTO: 0.6 % (ref 0–1.5)
BILIRUB SERPL-MCNC: 0.4 MG/DL (ref 0–1.2)
BUN SERPL-MCNC: 12 MG/DL (ref 8–23)
BUN/CREAT SERPL: 13.5 (ref 7–25)
CALCIUM SPEC-SCNC: 9.2 MG/DL (ref 8.6–10.5)
CHLORIDE SERPL-SCNC: 105 MMOL/L (ref 98–107)
CO2 SERPL-SCNC: 23 MMOL/L (ref 22–29)
CREAT SERPL-MCNC: 0.89 MG/DL (ref 0.76–1.27)
DEPRECATED RDW RBC AUTO: 44.6 FL (ref 37–54)
EGFRCR SERPLBLD CKD-EPI 2021: 88.8 ML/MIN/1.73
EOSINOPHIL # BLD AUTO: 0.22 10*3/MM3 (ref 0–0.4)
EOSINOPHIL NFR BLD AUTO: 3.1 % (ref 0.3–6.2)
ERYTHROCYTE [DISTWIDTH] IN BLOOD BY AUTOMATED COUNT: 13.9 % (ref 12.3–15.4)
GLOBULIN UR ELPH-MCNC: 2.4 GM/DL
GLUCOSE SERPL-MCNC: 104 MG/DL (ref 65–99)
HCT VFR BLD AUTO: 39.2 % (ref 37.5–51)
HGB BLD-MCNC: 13.9 G/DL (ref 13–17.7)
IMM GRANULOCYTES # BLD AUTO: 0.05 10*3/MM3 (ref 0–0.05)
IMM GRANULOCYTES NFR BLD AUTO: 0.7 % (ref 0–0.5)
INR PPP: 0.98 (ref 0.9–1.1)
LYMPHOCYTES # BLD AUTO: 1.11 10*3/MM3 (ref 0.7–3.1)
LYMPHOCYTES NFR BLD AUTO: 15.8 % (ref 19.6–45.3)
MCH RBC QN AUTO: 31.4 PG (ref 26.6–33)
MCHC RBC AUTO-ENTMCNC: 35.5 G/DL (ref 31.5–35.7)
MCV RBC AUTO: 88.5 FL (ref 79–97)
MONOCYTES # BLD AUTO: 0.48 10*3/MM3 (ref 0.1–0.9)
MONOCYTES NFR BLD AUTO: 6.8 % (ref 5–12)
NEUTROPHILS NFR BLD AUTO: 5.13 10*3/MM3 (ref 1.7–7)
NEUTROPHILS NFR BLD AUTO: 73 % (ref 42.7–76)
NRBC BLD AUTO-RTO: 0 /100 WBC (ref 0–0.2)
PLATELET # BLD AUTO: 238 10*3/MM3 (ref 140–450)
PMV BLD AUTO: 9.4 FL (ref 6–12)
POTASSIUM SERPL-SCNC: 4 MMOL/L (ref 3.5–5.2)
PROT SERPL-MCNC: 6.2 G/DL (ref 6–8.5)
PROTHROMBIN TIME: 12.9 SECONDS (ref 11.7–14.2)
RBC # BLD AUTO: 4.43 10*6/MM3 (ref 4.14–5.8)
SARS-COV-2 ORF1AB RESP QL NAA+PROBE: NOT DETECTED
SODIUM SERPL-SCNC: 140 MMOL/L (ref 136–145)
WBC NRBC COR # BLD: 7.03 10*3/MM3 (ref 3.4–10.8)

## 2022-05-29 PROCEDURE — 25010000002 HYDROMORPHONE 1 MG/ML SOLUTION: Performed by: EMERGENCY MEDICINE

## 2022-05-29 PROCEDURE — 99283 EMERGENCY DEPT VISIT LOW MDM: CPT

## 2022-05-29 PROCEDURE — 85610 PROTHROMBIN TIME: CPT | Performed by: PHYSICIAN ASSISTANT

## 2022-05-29 PROCEDURE — 85730 THROMBOPLASTIN TIME PARTIAL: CPT | Performed by: PHYSICIAN ASSISTANT

## 2022-05-29 PROCEDURE — G0378 HOSPITAL OBSERVATION PER HR: HCPCS

## 2022-05-29 PROCEDURE — 80053 COMPREHEN METABOLIC PANEL: CPT | Performed by: PHYSICIAN ASSISTANT

## 2022-05-29 PROCEDURE — 85025 COMPLETE CBC W/AUTO DIFF WBC: CPT | Performed by: PHYSICIAN ASSISTANT

## 2022-05-29 PROCEDURE — 25010000002 DEXAMETHASONE PER 1 MG: Performed by: INTERNAL MEDICINE

## 2022-05-29 PROCEDURE — U0005 INFEC AGEN DETEC AMPLI PROBE: HCPCS | Performed by: EMERGENCY MEDICINE

## 2022-05-29 PROCEDURE — U0004 COV-19 TEST NON-CDC HGH THRU: HCPCS | Performed by: EMERGENCY MEDICINE

## 2022-05-29 RX ORDER — DEXTROSE MONOHYDRATE 25 G/50ML
25 INJECTION, SOLUTION INTRAVENOUS
Status: DISCONTINUED | OUTPATIENT
Start: 2022-05-29 | End: 2022-06-04 | Stop reason: HOSPADM

## 2022-05-29 RX ORDER — ACETAMINOPHEN 325 MG/1
650 TABLET ORAL EVERY 4 HOURS PRN
Status: DISCONTINUED | OUTPATIENT
Start: 2022-05-29 | End: 2022-06-04 | Stop reason: HOSPADM

## 2022-05-29 RX ORDER — AMLODIPINE BESYLATE 5 MG/1
5 TABLET ORAL DAILY
Status: DISCONTINUED | OUTPATIENT
Start: 2022-05-30 | End: 2022-06-04 | Stop reason: HOSPADM

## 2022-05-29 RX ORDER — MULTIPLE VITAMINS W/ MINERALS TAB 9MG-400MCG
1 TAB ORAL DAILY
Status: DISCONTINUED | OUTPATIENT
Start: 2022-05-30 | End: 2022-06-04 | Stop reason: HOSPADM

## 2022-05-29 RX ORDER — ACETAMINOPHEN 160 MG/5ML
650 SOLUTION ORAL EVERY 4 HOURS PRN
Status: DISCONTINUED | OUTPATIENT
Start: 2022-05-29 | End: 2022-06-04 | Stop reason: HOSPADM

## 2022-05-29 RX ORDER — GABAPENTIN 400 MG/1
400 CAPSULE ORAL 3 TIMES DAILY
Status: DISCONTINUED | OUTPATIENT
Start: 2022-05-29 | End: 2022-06-04 | Stop reason: HOSPADM

## 2022-05-29 RX ORDER — ACETAMINOPHEN 650 MG/1
650 SUPPOSITORY RECTAL EVERY 4 HOURS PRN
Status: DISCONTINUED | OUTPATIENT
Start: 2022-05-29 | End: 2022-06-04 | Stop reason: HOSPADM

## 2022-05-29 RX ORDER — HYDROMORPHONE HYDROCHLORIDE 1 MG/ML
0.5 INJECTION, SOLUTION INTRAMUSCULAR; INTRAVENOUS; SUBCUTANEOUS EVERY 4 HOURS PRN
Status: DISCONTINUED | OUTPATIENT
Start: 2022-05-29 | End: 2022-05-30

## 2022-05-29 RX ORDER — ATORVASTATIN CALCIUM 20 MG/1
10 TABLET, FILM COATED ORAL DAILY
Refills: 1 | Status: DISCONTINUED | OUTPATIENT
Start: 2022-05-30 | End: 2022-06-04 | Stop reason: HOSPADM

## 2022-05-29 RX ORDER — NICOTINE POLACRILEX 4 MG
15 LOZENGE BUCCAL
Status: DISCONTINUED | OUTPATIENT
Start: 2022-05-29 | End: 2022-06-04 | Stop reason: HOSPADM

## 2022-05-29 RX ORDER — FLUTICASONE PROPIONATE 50 MCG
2 SPRAY, SUSPENSION (ML) NASAL DAILY
Status: DISCONTINUED | OUTPATIENT
Start: 2022-05-30 | End: 2022-06-04 | Stop reason: HOSPADM

## 2022-05-29 RX ORDER — DEXAMETHASONE SODIUM PHOSPHATE 4 MG/ML
4 INJECTION, SOLUTION INTRA-ARTICULAR; INTRALESIONAL; INTRAMUSCULAR; INTRAVENOUS; SOFT TISSUE 2 TIMES DAILY
Status: DISCONTINUED | OUTPATIENT
Start: 2022-05-29 | End: 2022-05-30

## 2022-05-29 RX ORDER — LOSARTAN POTASSIUM 100 MG/1
100 TABLET ORAL
Refills: 1 | Status: DISCONTINUED | OUTPATIENT
Start: 2022-05-30 | End: 2022-06-04 | Stop reason: HOSPADM

## 2022-05-29 RX ORDER — LEVOTHYROXINE SODIUM 0.03 MG/1
25 TABLET ORAL
Status: DISCONTINUED | OUTPATIENT
Start: 2022-05-30 | End: 2022-06-04 | Stop reason: HOSPADM

## 2022-05-29 RX ORDER — CETIRIZINE HYDROCHLORIDE 10 MG/1
10 TABLET ORAL DAILY
Status: DISCONTINUED | OUTPATIENT
Start: 2022-05-30 | End: 2022-06-04 | Stop reason: HOSPADM

## 2022-05-29 RX ORDER — PANTOPRAZOLE SODIUM 40 MG/1
40 TABLET, DELAYED RELEASE ORAL DAILY
Status: DISCONTINUED | OUTPATIENT
Start: 2022-05-30 | End: 2022-06-04 | Stop reason: HOSPADM

## 2022-05-29 RX ORDER — AMOXICILLIN 250 MG
2 CAPSULE ORAL NIGHTLY
Status: DISCONTINUED | OUTPATIENT
Start: 2022-05-29 | End: 2022-05-30

## 2022-05-29 RX ORDER — ONDANSETRON 2 MG/ML
4 INJECTION INTRAMUSCULAR; INTRAVENOUS EVERY 6 HOURS PRN
Status: DISCONTINUED | OUTPATIENT
Start: 2022-05-29 | End: 2022-06-04 | Stop reason: HOSPADM

## 2022-05-29 RX ORDER — LIDOCAINE 50 MG/G
1 PATCH TOPICAL
Status: DISCONTINUED | OUTPATIENT
Start: 2022-05-29 | End: 2022-06-04 | Stop reason: HOSPADM

## 2022-05-29 RX ADMIN — GABAPENTIN 400 MG: 400 CAPSULE ORAL at 22:38

## 2022-05-29 RX ADMIN — LIDOCAINE 1 PATCH: 50 PATCH TOPICAL at 17:50

## 2022-05-29 RX ADMIN — HYDROMORPHONE HYDROCHLORIDE 1 MG: 1 INJECTION, SOLUTION INTRAMUSCULAR; INTRAVENOUS; SUBCUTANEOUS at 17:49

## 2022-05-29 RX ADMIN — DEXAMETHASONE SODIUM PHOSPHATE 4 MG: 4 INJECTION, SOLUTION INTRAMUSCULAR; INTRAVENOUS at 22:38

## 2022-05-29 RX ADMIN — DOCUSATE SODIUM 50MG AND SENNOSIDES 8.6MG 2 TABLET: 8.6; 5 TABLET, FILM COATED ORAL at 22:38

## 2022-05-30 ENCOUNTER — APPOINTMENT (OUTPATIENT)
Dept: CT IMAGING | Facility: HOSPITAL | Age: 77
End: 2022-05-30

## 2022-05-30 PROBLEM — F11.20 OPIOID DEPENDENCE: Status: ACTIVE | Noted: 2022-05-30

## 2022-05-30 LAB
ANION GAP SERPL CALCULATED.3IONS-SCNC: 10 MMOL/L (ref 5–15)
BASOPHILS # BLD AUTO: 0.03 10*3/MM3 (ref 0–0.2)
BASOPHILS NFR BLD AUTO: 0.4 % (ref 0–1.5)
BUN SERPL-MCNC: 10 MG/DL (ref 8–23)
BUN/CREAT SERPL: 12.2 (ref 7–25)
CALCIUM SPEC-SCNC: 9.3 MG/DL (ref 8.6–10.5)
CHLORIDE SERPL-SCNC: 106 MMOL/L (ref 98–107)
CO2 SERPL-SCNC: 23 MMOL/L (ref 22–29)
CREAT SERPL-MCNC: 0.82 MG/DL (ref 0.76–1.27)
DEPRECATED RDW RBC AUTO: 48.2 FL (ref 37–54)
EGFRCR SERPLBLD CKD-EPI 2021: 91 ML/MIN/1.73
EOSINOPHIL # BLD AUTO: 0.01 10*3/MM3 (ref 0–0.4)
EOSINOPHIL NFR BLD AUTO: 0.1 % (ref 0.3–6.2)
ERYTHROCYTE [DISTWIDTH] IN BLOOD BY AUTOMATED COUNT: 14.2 % (ref 12.3–15.4)
GLUCOSE BLDC GLUCOMTR-MCNC: 120 MG/DL (ref 70–130)
GLUCOSE BLDC GLUCOMTR-MCNC: 208 MG/DL (ref 70–130)
GLUCOSE SERPL-MCNC: 132 MG/DL (ref 65–99)
HCT VFR BLD AUTO: 41.3 % (ref 37.5–51)
HGB BLD-MCNC: 14 G/DL (ref 13–17.7)
IMM GRANULOCYTES # BLD AUTO: 0.05 10*3/MM3 (ref 0–0.05)
IMM GRANULOCYTES NFR BLD AUTO: 0.6 % (ref 0–0.5)
LYMPHOCYTES # BLD AUTO: 0.69 10*3/MM3 (ref 0.7–3.1)
LYMPHOCYTES NFR BLD AUTO: 8.4 % (ref 19.6–45.3)
MCH RBC QN AUTO: 31.4 PG (ref 26.6–33)
MCHC RBC AUTO-ENTMCNC: 33.9 G/DL (ref 31.5–35.7)
MCV RBC AUTO: 92.6 FL (ref 79–97)
MONOCYTES # BLD AUTO: 0.1 10*3/MM3 (ref 0.1–0.9)
MONOCYTES NFR BLD AUTO: 1.2 % (ref 5–12)
NEUTROPHILS NFR BLD AUTO: 7.34 10*3/MM3 (ref 1.7–7)
NEUTROPHILS NFR BLD AUTO: 89.3 % (ref 42.7–76)
NRBC BLD AUTO-RTO: 0 /100 WBC (ref 0–0.2)
PLATELET # BLD AUTO: 240 10*3/MM3 (ref 140–450)
PMV BLD AUTO: 9.6 FL (ref 6–12)
POTASSIUM SERPL-SCNC: 4 MMOL/L (ref 3.5–5.2)
RBC # BLD AUTO: 4.46 10*6/MM3 (ref 4.14–5.8)
SODIUM SERPL-SCNC: 139 MMOL/L (ref 136–145)
WBC NRBC COR # BLD: 8.22 10*3/MM3 (ref 3.4–10.8)

## 2022-05-30 PROCEDURE — G0378 HOSPITAL OBSERVATION PER HR: HCPCS

## 2022-05-30 PROCEDURE — 36415 COLL VENOUS BLD VENIPUNCTURE: CPT | Performed by: INTERNAL MEDICINE

## 2022-05-30 PROCEDURE — 25010000002 METOCLOPRAMIDE PER 10 MG: Performed by: PHYSICIAN ASSISTANT

## 2022-05-30 PROCEDURE — 99204 OFFICE O/P NEW MOD 45 MIN: CPT | Performed by: PHYSICIAN ASSISTANT

## 2022-05-30 PROCEDURE — 74177 CT ABD & PELVIS W/CONTRAST: CPT

## 2022-05-30 PROCEDURE — 85025 COMPLETE CBC W/AUTO DIFF WBC: CPT | Performed by: INTERNAL MEDICINE

## 2022-05-30 PROCEDURE — 25010000002 ONDANSETRON PER 1 MG: Performed by: INTERNAL MEDICINE

## 2022-05-30 PROCEDURE — 82962 GLUCOSE BLOOD TEST: CPT

## 2022-05-30 PROCEDURE — 25010000002 HYDROMORPHONE PER 4 MG: Performed by: INTERNAL MEDICINE

## 2022-05-30 PROCEDURE — 25010000002 IOPAMIDOL 61 % SOLUTION: Performed by: STUDENT IN AN ORGANIZED HEALTH CARE EDUCATION/TRAINING PROGRAM

## 2022-05-30 PROCEDURE — 80048 BASIC METABOLIC PNL TOTAL CA: CPT | Performed by: INTERNAL MEDICINE

## 2022-05-30 PROCEDURE — 25010000002 DEXAMETHASONE PER 1 MG: Performed by: INTERNAL MEDICINE

## 2022-05-30 RX ORDER — AMOXICILLIN 250 MG
2 CAPSULE ORAL DAILY
Status: DISCONTINUED | OUTPATIENT
Start: 2022-05-30 | End: 2022-05-30

## 2022-05-30 RX ORDER — LOPERAMIDE HYDROCHLORIDE 2 MG/1
2 CAPSULE ORAL ONCE
Status: COMPLETED | OUTPATIENT
Start: 2022-05-30 | End: 2022-05-30

## 2022-05-30 RX ORDER — METOCLOPRAMIDE HYDROCHLORIDE 5 MG/ML
10 INJECTION INTRAMUSCULAR; INTRAVENOUS ONCE
Status: COMPLETED | OUTPATIENT
Start: 2022-05-30 | End: 2022-05-30

## 2022-05-30 RX ORDER — POLYETHYLENE GLYCOL 3350 17 G/17G
17 POWDER, FOR SOLUTION ORAL DAILY PRN
Status: DISCONTINUED | OUTPATIENT
Start: 2022-05-30 | End: 2022-06-04 | Stop reason: HOSPADM

## 2022-05-30 RX ORDER — HYDROCODONE BITARTRATE AND ACETAMINOPHEN 7.5; 325 MG/1; MG/1
1 TABLET ORAL EVERY 6 HOURS PRN
Status: DISCONTINUED | OUTPATIENT
Start: 2022-05-30 | End: 2022-05-30

## 2022-05-30 RX ORDER — BISACODYL 10 MG
10 SUPPOSITORY, RECTAL RECTAL DAILY PRN
Status: DISCONTINUED | OUTPATIENT
Start: 2022-05-30 | End: 2022-06-04 | Stop reason: HOSPADM

## 2022-05-30 RX ORDER — HYDROCODONE BITARTRATE AND ACETAMINOPHEN 7.5; 325 MG/1; MG/1
1 TABLET ORAL EVERY 4 HOURS PRN
Status: DISCONTINUED | OUTPATIENT
Start: 2022-05-30 | End: 2022-05-31

## 2022-05-30 RX ORDER — HYDROMORPHONE HYDROCHLORIDE 1 MG/ML
0.5 INJECTION, SOLUTION INTRAMUSCULAR; INTRAVENOUS; SUBCUTANEOUS
Status: DISCONTINUED | OUTPATIENT
Start: 2022-05-30 | End: 2022-06-03

## 2022-05-30 RX ADMIN — LOPERAMIDE HYDROCHLORIDE 2 MG: 2 CAPSULE ORAL at 16:02

## 2022-05-30 RX ADMIN — HYDROCODONE BITARTRATE AND ACETAMINOPHEN 1 TABLET: 7.5; 325 TABLET ORAL at 09:39

## 2022-05-30 RX ADMIN — GABAPENTIN 400 MG: 400 CAPSULE ORAL at 16:02

## 2022-05-30 RX ADMIN — HYDROMORPHONE HYDROCHLORIDE 0.5 MG: 1 INJECTION, SOLUTION INTRAMUSCULAR; INTRAVENOUS; SUBCUTANEOUS at 05:33

## 2022-05-30 RX ADMIN — METOCLOPRAMIDE HYDROCHLORIDE 10 MG: 5 INJECTION INTRAMUSCULAR; INTRAVENOUS at 09:39

## 2022-05-30 RX ADMIN — GABAPENTIN 400 MG: 400 CAPSULE ORAL at 09:33

## 2022-05-30 RX ADMIN — FLUTICASONE PROPIONATE 2 SPRAY: 50 SPRAY, METERED NASAL at 09:38

## 2022-05-30 RX ADMIN — IOPAMIDOL 85 ML: 612 INJECTION, SOLUTION INTRAVENOUS at 20:18

## 2022-05-30 RX ADMIN — LIDOCAINE 1 PATCH: 50 PATCH TOPICAL at 09:38

## 2022-05-30 RX ADMIN — ONDANSETRON 4 MG: 2 INJECTION INTRAMUSCULAR; INTRAVENOUS at 07:50

## 2022-05-30 RX ADMIN — ATORVASTATIN CALCIUM 10 MG: 20 TABLET, FILM COATED ORAL at 09:37

## 2022-05-30 RX ADMIN — HYDROCODONE BITARTRATE AND ACETAMINOPHEN 1 TABLET: 7.5; 325 TABLET ORAL at 17:57

## 2022-05-30 RX ADMIN — AMLODIPINE BESYLATE 5 MG: 5 TABLET ORAL at 09:37

## 2022-05-30 RX ADMIN — GABAPENTIN 400 MG: 400 CAPSULE ORAL at 20:36

## 2022-05-30 RX ADMIN — MULTIPLE VITAMINS W/ MINERALS TAB 1 TABLET: TAB at 09:36

## 2022-05-30 RX ADMIN — DEXAMETHASONE SODIUM PHOSPHATE 4 MG: 4 INJECTION, SOLUTION INTRAMUSCULAR; INTRAVENOUS at 09:40

## 2022-05-30 RX ADMIN — PANTOPRAZOLE SODIUM 40 MG: 40 TABLET, DELAYED RELEASE ORAL at 09:35

## 2022-05-30 RX ADMIN — LOSARTAN POTASSIUM 100 MG: 100 TABLET, FILM COATED ORAL at 09:39

## 2022-05-30 RX ADMIN — LOPERAMIDE HYDROCHLORIDE 2 MG: 2 CAPSULE ORAL at 21:33

## 2022-05-30 RX ADMIN — CETIRIZINE HYDROCHLORIDE 10 MG: 10 TABLET ORAL at 09:37

## 2022-05-30 RX ADMIN — LEVOTHYROXINE SODIUM 25 MCG: 0.03 TABLET ORAL at 05:30

## 2022-05-30 RX ADMIN — ONDANSETRON 4 MG: 2 INJECTION INTRAMUSCULAR; INTRAVENOUS at 17:57

## 2022-05-30 RX ADMIN — Medication 500 MG: at 09:35

## 2022-05-31 PROCEDURE — 25010000002 ONDANSETRON PER 1 MG: Performed by: INTERNAL MEDICINE

## 2022-05-31 PROCEDURE — G0378 HOSPITAL OBSERVATION PER HR: HCPCS

## 2022-05-31 PROCEDURE — 99213 OFFICE O/P EST LOW 20 MIN: CPT | Performed by: NURSE PRACTITIONER

## 2022-05-31 RX ORDER — HYDROCODONE BITARTRATE AND ACETAMINOPHEN 10; 325 MG/1; MG/1
1 TABLET ORAL EVERY 4 HOURS PRN
Status: DISCONTINUED | OUTPATIENT
Start: 2022-05-31 | End: 2022-06-04 | Stop reason: HOSPADM

## 2022-05-31 RX ADMIN — CETIRIZINE HYDROCHLORIDE 10 MG: 10 TABLET ORAL at 08:02

## 2022-05-31 RX ADMIN — GABAPENTIN 400 MG: 400 CAPSULE ORAL at 20:30

## 2022-05-31 RX ADMIN — HYDROCODONE BITARTRATE AND ACETAMINOPHEN 1 TABLET: 10; 325 TABLET ORAL at 20:29

## 2022-05-31 RX ADMIN — HYDROCODONE BITARTRATE AND ACETAMINOPHEN 1 TABLET: 10; 325 TABLET ORAL at 16:24

## 2022-05-31 RX ADMIN — Medication 500 MG: at 08:02

## 2022-05-31 RX ADMIN — LOSARTAN POTASSIUM 100 MG: 100 TABLET, FILM COATED ORAL at 08:02

## 2022-05-31 RX ADMIN — AMLODIPINE BESYLATE 5 MG: 5 TABLET ORAL at 08:02

## 2022-05-31 RX ADMIN — MULTIPLE VITAMINS W/ MINERALS TAB 1 TABLET: TAB at 08:02

## 2022-05-31 RX ADMIN — GABAPENTIN 400 MG: 400 CAPSULE ORAL at 16:24

## 2022-05-31 RX ADMIN — ONDANSETRON 4 MG: 2 INJECTION INTRAMUSCULAR; INTRAVENOUS at 02:49

## 2022-05-31 RX ADMIN — ONDANSETRON 4 MG: 2 INJECTION INTRAMUSCULAR; INTRAVENOUS at 20:29

## 2022-05-31 RX ADMIN — PANTOPRAZOLE SODIUM 40 MG: 40 TABLET, DELAYED RELEASE ORAL at 06:18

## 2022-05-31 RX ADMIN — GABAPENTIN 400 MG: 400 CAPSULE ORAL at 08:03

## 2022-05-31 RX ADMIN — ONDANSETRON 4 MG: 2 INJECTION INTRAMUSCULAR; INTRAVENOUS at 12:01

## 2022-05-31 RX ADMIN — HYDROCODONE BITARTRATE AND ACETAMINOPHEN 1 TABLET: 7.5; 325 TABLET ORAL at 02:50

## 2022-05-31 RX ADMIN — FLUTICASONE PROPIONATE 2 SPRAY: 50 SPRAY, METERED NASAL at 08:04

## 2022-05-31 RX ADMIN — HYDROCODONE BITARTRATE AND ACETAMINOPHEN 1 TABLET: 10; 325 TABLET ORAL at 12:01

## 2022-05-31 RX ADMIN — HYDROCODONE BITARTRATE AND ACETAMINOPHEN 1 TABLET: 7.5; 325 TABLET ORAL at 08:03

## 2022-05-31 RX ADMIN — ATORVASTATIN CALCIUM 10 MG: 20 TABLET, FILM COATED ORAL at 08:03

## 2022-05-31 RX ADMIN — LEVOTHYROXINE SODIUM 25 MCG: 0.03 TABLET ORAL at 06:18

## 2022-05-31 RX ADMIN — LIDOCAINE 1 PATCH: 50 PATCH TOPICAL at 12:01

## 2022-06-01 ENCOUNTER — APPOINTMENT (OUTPATIENT)
Dept: PAIN MEDICINE | Facility: HOSPITAL | Age: 77
End: 2022-06-01

## 2022-06-01 ENCOUNTER — ANESTHESIA (OUTPATIENT)
Dept: PAIN MEDICINE | Facility: HOSPITAL | Age: 77
End: 2022-06-01

## 2022-06-01 ENCOUNTER — APPOINTMENT (OUTPATIENT)
Dept: GENERAL RADIOLOGY | Facility: HOSPITAL | Age: 77
End: 2022-06-01

## 2022-06-01 ENCOUNTER — TELEPHONE (OUTPATIENT)
Dept: FAMILY MEDICINE CLINIC | Facility: CLINIC | Age: 77
End: 2022-06-01

## 2022-06-01 ENCOUNTER — ANESTHESIA EVENT (OUTPATIENT)
Dept: PAIN MEDICINE | Facility: HOSPITAL | Age: 77
End: 2022-06-01

## 2022-06-01 PROCEDURE — 77003 FLUOROGUIDE FOR SPINE INJECT: CPT

## 2022-06-01 PROCEDURE — 99213 OFFICE O/P EST LOW 20 MIN: CPT | Performed by: NURSE PRACTITIONER

## 2022-06-01 PROCEDURE — 25010000002 MIDAZOLAM PER 1 MG: Performed by: ANESTHESIOLOGY

## 2022-06-01 PROCEDURE — 3E0U3GC INTRODUCTION OF OTHER THERAPEUTIC SUBSTANCE INTO JOINTS, PERCUTANEOUS APPROACH: ICD-10-PCS | Performed by: ANESTHESIOLOGY

## 2022-06-01 PROCEDURE — G0378 HOSPITAL OBSERVATION PER HR: HCPCS

## 2022-06-01 PROCEDURE — 25010000002 ONDANSETRON PER 1 MG: Performed by: INTERNAL MEDICINE

## 2022-06-01 PROCEDURE — 25010000002 HYDROMORPHONE PER 4 MG: Performed by: STUDENT IN AN ORGANIZED HEALTH CARE EDUCATION/TRAINING PROGRAM

## 2022-06-01 PROCEDURE — 25010000002 METHYLPREDNISOLONE PER 80 MG: Performed by: ANESTHESIOLOGY

## 2022-06-01 RX ORDER — LIDOCAINE HYDROCHLORIDE 10 MG/ML
1 INJECTION, SOLUTION INFILTRATION; PERINEURAL ONCE
Status: DISCONTINUED | OUTPATIENT
Start: 2022-06-01 | End: 2022-06-03

## 2022-06-01 RX ORDER — METHOCARBAMOL 750 MG/1
750 TABLET, FILM COATED ORAL EVERY 6 HOURS SCHEDULED
Status: DISCONTINUED | OUTPATIENT
Start: 2022-06-01 | End: 2022-06-02

## 2022-06-01 RX ORDER — METHYLPREDNISOLONE ACETATE 80 MG/ML
80 INJECTION, SUSPENSION INTRA-ARTICULAR; INTRALESIONAL; INTRAMUSCULAR; SOFT TISSUE ONCE
Status: COMPLETED | OUTPATIENT
Start: 2022-06-01 | End: 2022-06-01

## 2022-06-01 RX ORDER — BUPIVACAINE HYDROCHLORIDE 2.5 MG/ML
2 INJECTION, SOLUTION EPIDURAL; INFILTRATION; INTRACAUDAL ONCE
Status: DISCONTINUED | OUTPATIENT
Start: 2022-06-01 | End: 2022-06-03

## 2022-06-01 RX ORDER — MIDAZOLAM HYDROCHLORIDE 1 MG/ML
1 INJECTION INTRAMUSCULAR; INTRAVENOUS AS NEEDED
Status: DISCONTINUED | OUTPATIENT
Start: 2022-06-01 | End: 2022-06-03

## 2022-06-01 RX ORDER — FENTANYL CITRATE 50 UG/ML
50 INJECTION, SOLUTION INTRAMUSCULAR; INTRAVENOUS AS NEEDED
Status: DISCONTINUED | OUTPATIENT
Start: 2022-06-01 | End: 2022-06-03

## 2022-06-01 RX ADMIN — HYDROCODONE BITARTRATE AND ACETAMINOPHEN 1 TABLET: 10; 325 TABLET ORAL at 19:18

## 2022-06-01 RX ADMIN — METHOCARBAMOL TABLETS 750 MG: 750 TABLET, COATED ORAL at 23:26

## 2022-06-01 RX ADMIN — ONDANSETRON 4 MG: 2 INJECTION INTRAMUSCULAR; INTRAVENOUS at 04:12

## 2022-06-01 RX ADMIN — ONDANSETRON 4 MG: 2 INJECTION INTRAMUSCULAR; INTRAVENOUS at 23:28

## 2022-06-01 RX ADMIN — METHYLPREDNISOLONE ACETATE 80 MG: 80 INJECTION, SUSPENSION INTRA-ARTICULAR; INTRALESIONAL; INTRAMUSCULAR; SOFT TISSUE at 09:14

## 2022-06-01 RX ADMIN — AMLODIPINE BESYLATE 5 MG: 5 TABLET ORAL at 11:55

## 2022-06-01 RX ADMIN — HYDROMORPHONE HYDROCHLORIDE 0.5 MG: 1 INJECTION, SOLUTION INTRAMUSCULAR; INTRAVENOUS; SUBCUTANEOUS at 04:12

## 2022-06-01 RX ADMIN — GABAPENTIN 400 MG: 400 CAPSULE ORAL at 15:25

## 2022-06-01 RX ADMIN — METHOCARBAMOL TABLETS 750 MG: 750 TABLET, COATED ORAL at 17:15

## 2022-06-01 RX ADMIN — HYDROMORPHONE HYDROCHLORIDE 0.5 MG: 1 INJECTION, SOLUTION INTRAMUSCULAR; INTRAVENOUS; SUBCUTANEOUS at 07:20

## 2022-06-01 RX ADMIN — LOSARTAN POTASSIUM 100 MG: 100 TABLET, FILM COATED ORAL at 11:55

## 2022-06-01 RX ADMIN — MIDAZOLAM HYDROCHLORIDE 2 MG: 2 INJECTION, SOLUTION INTRAMUSCULAR; INTRAVENOUS at 09:08

## 2022-06-01 RX ADMIN — LIDOCAINE 1 PATCH: 50 PATCH TOPICAL at 11:09

## 2022-06-01 RX ADMIN — HYDROMORPHONE HYDROCHLORIDE 0.5 MG: 1 INJECTION, SOLUTION INTRAMUSCULAR; INTRAVENOUS; SUBCUTANEOUS at 00:07

## 2022-06-01 RX ADMIN — HYDROCODONE BITARTRATE AND ACETAMINOPHEN 1 TABLET: 10; 325 TABLET ORAL at 11:09

## 2022-06-01 RX ADMIN — GABAPENTIN 400 MG: 400 CAPSULE ORAL at 19:53

## 2022-06-01 RX ADMIN — HYDROCODONE BITARTRATE AND ACETAMINOPHEN 1 TABLET: 10; 325 TABLET ORAL at 15:24

## 2022-06-01 NOTE — TELEPHONE ENCOUNTER
Caller: No Crespo    Relationship: Emergency Contact    Best call back number:     What is the medical concern/diagnosis: TROUBLE WITH BACK    What specialty or service is being requested: PAIN MANAGEMENT     What is the provider, practice or medical service name: [facility]    Any additional details: WOULD LIKE A NEW VERBAL RELEASE FORM MAILED TO HIM TO FILL OUT

## 2022-06-01 NOTE — PLAN OF CARE
Goal Outcome Evaluation:  Plan of Care Reviewed With: patient        Progress: no change  Outcome Evaluation: VSS, RA, SL, up adlib, voiding per BRP, pain increased, IV dilaudid given since midnight, NPO for lumbar injection, educated on BP monitoring, home when stable post procedure

## 2022-06-01 NOTE — PROGRESS NOTES
Name: Kali Crespo ADMIT: 2022   : 1945  PCP: Nathan Arango MD    MRN: [id number] LOS: 0 days   AGE/SEX: 76 y.o. male  ROOM: [facility]     Subjective   Subjective   Patient is lying on the bed and is complaining of low back pain.  Denies nausea, vomiting abdominal pain, chest pain.       Objective   Objective   Vital Signs  Temp:  [97.3 °F (36.3 °C)-98.8 °F (37.1 °C)] 97.6 °F (36.4 °C)  Heart Rate:  [71-90] 71  Resp:  [14-16] 16  BP: (123-154)/(61-91) 147/84  SpO2:  [94 %-98 %] 96 %  on   ;   Device (Oxygen Therapy): room air  Body mass index is 27.62 kg/m².  Physical Exam  Constitutional:       General: He is not in acute distress.     Appearance: Normal appearance. He is not ill-appearing.   HENT:      Head: Normocephalic and atraumatic.      Mouth/Throat:      Mouth: Mucous membranes are moist.   Eyes:      Extraocular Movements: Extraocular movements intact.      Pupils: Pupils are equal, round, and reactive to light.   Cardiovascular:      Rate and Rhythm: Normal rate and regular rhythm.      Pulses: Normal pulses.      Heart sounds: Normal heart sounds.   Pulmonary:      Effort: Pulmonary effort is normal. No respiratory distress.      Breath sounds: No wheezing or rhonchi.   Abdominal:      General: Bowel sounds are normal.      Palpations: Abdomen is soft.      Tenderness: There is no abdominal tenderness. There is no guarding.   Musculoskeletal:      Cervical back: Normal range of motion and neck supple.      Right lower leg: No edema.      Left lower leg: No edema.   Skin:     General: Skin is warm and dry.   Neurological:      General: No focal deficit present.      Mental Status: He is alert and oriented to person, place, and time.      Cranial Nerves: No cranial nerve deficit.   Psychiatric:         Mood and Affect: Mood normal.         Results Review     I reviewed the patient's new clinical results.  Results from last 7 days   Lab Units 22  0559 22  1747   WBC  10*3/mm3 8.22 7.03   HEMOGLOBIN g/dL 14.0 13.9   PLATELETS 10*3/mm3 240 238     Results from last 7 days   Lab Units 05/30/22  0559 05/29/22  1747   SODIUM mmol/L 139 140   POTASSIUM mmol/L 4.0 4.0   CHLORIDE mmol/L 106 105   CO2 mmol/L 23.0 23.0   BUN mg/dL 10 12   CREATININE mg/dL 0.82 0.89   GLUCOSE mg/dL 132* 104*   Estimated Creatinine Clearance: 94.6 mL/min (by C-G formula based on SCr of 0.82 mg/dL).  Results from last 7 days   Lab Units 05/29/22  1747   ALBUMIN g/dL 3.80   BILIRUBIN mg/dL 0.4   ALK PHOS U/L 86   AST (SGOT) U/L 18   ALT (SGPT) U/L 24     Results from last 7 days   Lab Units 05/30/22  0559 05/29/22  1747   CALCIUM mg/dL 9.3 9.2   ALBUMIN g/dL  --  3.80       COVID19   Date Value Ref Range Status   05/29/2022 Not Detected Not Detected - Ref. Range Final     SARS-CoV-2, MARGOTH   Date Value Ref Range Status   01/26/2022 Not Detected Not Detected Final     Comment:     This nucleic acid amplification test was developed and its performance  characteristics determined by [employer]. Nucleic acid  amplification tests include RT-PCR and TMA. This test has not been  FDA cleared or approved. This test has been authorized by FDA under  an Emergency Use Authorization (EUA). This test is only authorized  for the duration of time the declaration that circumstances exist  justifying the authorization of the emergency use of in vitro  diagnostic tests for detection of SARS-CoV-2 virus and/or diagnosis  of COVID-19 infection under section 564(b)(1) of the Act, 21 U.S.C.  360bbb-3(b) (1), unless the authorization is terminated or revoked  sooner.  When diagnostic testing is negative, the possibility of a false  negative result should be considered in the context of a patient's  recent exposures and the presence of clinical signs and symptoms  consistent with COVID-19. An individual without symptoms of COVID-19  and who is not shedding SARS-CoV-2 virus would expect to have a  negative (not detected)  result in this assay.       Glucose   Date/Time Value Ref Range Status   05/30/2022 1230 208 (H) 70 - 130 mg/dL Final     Comment:     Meter: [id number] : [id number] Giovanni España   05/30/2022 0555 120 70 - 130 mg/dL Final     Comment:     Meter: [id number] : [id number] Colin Collierwarren WILLS       [place] [facility]  This procedure was auto-finalized with no dictation required.    I reviewed the patient's daily medications.  Scheduled Medications  amLODIPine, 5 mg, Oral, Daily  atorvastatin, 10 mg, Oral, Daily  bupivacaine (PF), 2 mL, Infiltration, Once  cetirizine, 10 mg, Oral, Daily  fluticasone, 2 spray, Nasal, Daily  gabapentin, 400 mg, Oral, TID  levothyroxine, 25 mcg, Oral, Q AM  lidocaine, 1 patch, Transdermal, Q24H  lidocaine, 1 mL, Intradermal, Once  losartan, 100 mg, Oral, Q24H  multivitamin with minerals, 1 tablet, Oral, Daily  pantoprazole, 40 mg, Oral, Daily  thiamine, 500 mg, Oral, Daily    Infusions   Diet  Diet Regular; Cardiac       Assessment/Plan     Active Hospital Problems    Diagnosis  POA   • **Intractable low back pain [M54.59]  Yes   • Opioid dependence (HCC) [F11.20]  Yes   • Acquired hypothyroidism [E03.9]  Yes   • DDD (degenerative disc disease), lumbar [M51.36]  Yes   • Benign essential HTN [I10]  Yes   • Acid reflux [K21.9]  Yes      Resolved Hospital Problems   No resolved problems to display.       76 y.o. male with history of chronic low back pain due to degenerative disc disease who presented with severe and intractable left-sided low back pain.      1.Lumbar radiculopathy, continue with analgesics which include Norco and IV Dilaudid for breakthrough pain.  Did undergo spinal injection earlier today and will see how his pain will be controlled after the procedure.  Continue with PT/OT and appreciate neurosurgery input.  2.  Hypertension, continue with amlodipine and losartan.  3.  Hyperglycemia steroid-induced.  4.  Hypothyroidism, on Synthroid.    · SCDs for DVT  prophylaxis.  · Full code.  · Discussed with patient and nursing staff.  · Anticipate discharge home in 1-2 days. Pending pain management evaluation.  PT consult      Marvel Noonan MD  [facility]  06/01/22  13:43 EDT    Patient was placed in face mask on first look.  I wore protective equipment throughout this patient encounter including a face mask, gloves and protective eyewear.  Hand hygiene was performed before donning protective equipment and after removal when leaving the room.

## 2022-06-01 NOTE — ANESTHESIA PROCEDURE NOTES
PAIN Intra-articular Facet Injection    Pre-sedation assessment completed: 6/1/2022 9:00 AM    Patient reassessed immediately prior to procedure    Patient location during procedure: Pain Clinic  Start time: 6/1/2022 9:00 AM  Stop Time: 6/1/2022 9:16 AM    Reason for block: procedure for pain  Performed by  Anesthesiologist: Cecilio Wynne MD  Preanesthetic Checklist  Completed: patient identified, site marked, risks and benefits discussed, surgical consent, monitors and equipment checked, pre-op evaluation and timeout performed  Prep:  Patient position: prone  Sterile barriers:cap, gloves, mask and sterile barrier  Prep: ChloraPrep  Patient monitoring: blood pressure monitoring, continuous pulse oximetry and EKG  Procedure:  Sedation:yes  Approach:unilateral left  Guidance:fluoroscopy  Location:lumbar  Level:4-5  Needle Gauge:22 G  Aspiration:negative  Medications:  Depomedrol:80 mg  Comment:1cc 0.5% ropivacaine    Post Assessment  Injection Assessment: negative  Patient Tolerance:comfortable throughout block  Complications:no  Additional Notes  diagnosis -   Post-Op Diagnosis Codes:     * Lumbar facet arthropathy (M47.816)     * Lumbar radiculopathy (M54.16)

## 2022-06-01 NOTE — PROGRESS NOTES
NEUROSURGERY FOLLOW UP VISIT     LOS: 0 days   Patient Care Team:  Nathan Arango MD as PCP - General  Nathan Arango MD as PCP - [facility]  [place], MICHELA Barrett (Dermatology)    Chief Complaint:  Back pain    Subjective       Interval History: Got facet injection this morning in pain management. Unable to tell much difference. C/O left sided lumbar pain. Wife also at bedside. Denies leg pain.     History taken from: patient chart    Objective        Vital Signs  Temp:  [97.3 °F (36.3 °C)-98.8 °F (37.1 °C)] 97.6 °F (36.4 °C)  Heart Rate:  [71-90] 71  Resp:  [14-16] 16  BP: (123-154)/(61-91) 147/84    Physical Exam:     AA&O x 3.   Sitting up in recliner chair. In street clothes.  Moving all extremities without difficulty  No motor deficits.     Assessment & Plan       Intractable low back pain    Benign essential HTN    Acid reflux    DDD (degenerative disc disease), lumbar    Acquired hypothyroidism    Opioid dependence (HCC)    Try muscle relaxers for back discomfort.  OK for discharge anytime from neurosurgical standpoint.   Patient and wife reminded that MRI showed nothing surgical. Recommend that he continue working with pain management.     MICHELA Sequeira  06/01/22  14:23 EDT

## 2022-06-01 NOTE — PLAN OF CARE
Goal Outcome Evaluation:           Progress: (P) improving  Outcome Evaluation: (P) Pt had lumbar injection today. VSS, RA, up adlib, voiding per BRP, PO pain meds, educated on BP monitoring, home when stable, will continue to monitor

## 2022-06-01 NOTE — H&P
[facility]    History and Physical    Patient Name: Kali Crespo  :  1945  MRN:  [id number]  Date of Admission: 2022    Subjective     Patient is a 76 y.o. male presents with chief complaint of chronic, intermitent, moderate, severe low back and leg: right pain.  Onset of symptoms was gradual starting several months ago.  Symptoms are associated/aggravated by activity. Symptoms improve with rest    The following portions of the patients history were reviewed and updated as appropriate: current medications, allergies, past medical history, past surgical history, past family history, past social history and problem list                Objective     Past Medical History:   Past Medical History:   Diagnosis Date   • Anxiety    • DDD (degenerative disc disease), cervical    • DDD (degenerative disc disease), lumbosacral    • Erectile dysfunction    • GERD (gastroesophageal reflux disease)    • H/O complete eye exam 2017   • Headache    • Hyperlipidemia    • Hypertension    • Hyperthyroidism    • Measles    • Meniere disease    • Mumps    • Prostate cancer (HCC)      Past Surgical History:   Past Surgical History:   Procedure Laterality Date   • CATARACT EXTRACTION     • COLONOSCOPY     • EYE SURGERY     • KNEE SURGERY     • PROSTATE SURGERY     • SINUS SURGERY     • TONSILLECTOMY     • TRIGGER FINGER RELEASE Left 2021    Procedure: LEFT RING FINGER EXTENSOR TENDON REPAIR;  Surgeon: David Leonard MD;  Location: [facility] [place];  Service: Orthopedics;  Laterality: Left;     Family History:   Family History   Problem Relation Age of Onset   • Heart disease Mother    • Stroke Mother    • Cancer Father         prostate   • Migraines Sister    • Malig Hyperthermia Neg Hx      Social History:   Social History     Socioeconomic History   • Marital status:    Tobacco Use   • Smoking status: Former Smoker     Packs/day: 0.25     Years: 4.00     Pack years: 1.00     Types: Cigarettes      "Start date:      Quit date:      Years since quittin.4   • Smokeless tobacco: Never Used   • Tobacco comment: 40 years ago   Substance and Sexual Activity   • Alcohol use: No     Comment: seldom   • Drug use: No   • Sexual activity: Defer       Vital Signs Range for the last 24 hours  Temperature: Temp:  [36.3 °C (97.3 °F)-37.1 °C (98.8 °F)] 36.9 °C (98.4 °F)   Temp Source: Temp src: Infrared   BP: BP: (123-154)/(61-91) 141/91   Pulse: Heart Rate:  [76-90] 83   Respirations: Resp:  [16] 16   SPO2: SpO2:  [96 %-98 %] 97 %   O2 Amount (l/min):     O2 Devices Device (Oxygen Therapy): room air   Weight:       Flowsheet Rows    Flowsheet Row First Filed Value   Admission Height 177.8 cm (70\") Documented at 2022   Admission Weight 87.3 kg (192 lb 7.4 oz) Documented at 2022          --------------------------------------------------------------------------------    Current Facility-Administered Medications   Medication Dose Route Frequency Provider Last Rate Last Admin   • acetaminophen (TYLENOL) tablet 650 mg  650 mg Oral Q4H PRN Stephanie Candelario MD        Or   • acetaminophen (TYLENOL) 160 MG/5ML solution 650 mg  650 mg Oral Q4H PRN Stephanie Candelario MD        Or   • acetaminophen (TYLENOL) suppository 650 mg  650 mg Rectal Q4H PRN Stephanie Candelario MD       • amLODIPine (NORVASC) tablet 5 mg  5 mg Oral Daily Stephanie Candelario MD   5 mg at 22   • atorvastatin (LIPITOR) tablet 10 mg  10 mg Oral Daily Stephanie Candelario MD   10 mg at 22   • bisacodyl (DULCOLAX) suppository 10 mg  10 mg Rectal Daily PRN Hanna Gatica DO       • cetirizine (zyrTEC) tablet 10 mg  10 mg Oral Daily Stephanie Candelario MD   10 mg at 22 0802   • dextrose (D50W) (25 g/50 mL) IV injection 25 g  25 g Intravenous Q15 Min PRN Stephanie Candelario MD       • dextrose (GLUTOSE) oral gel 15 g  15 g Oral Q15 Min PRN Stpehanie Candelario MD       • " fluticasone (FLONASE) 50 MCG/ACT nasal spray 2 spray  2 spray Nasal Daily Stephanie Candelario MD   2 spray at 05/31/22 0804   • gabapentin (NEURONTIN) capsule 400 mg  400 mg Oral TID Stephanie Candelario MD   400 mg at 05/31/22 2030   • glucagon (human recombinant) (GLUCAGEN DIAGNOSTIC) injection 1 mg  1 mg Intramuscular Q15 Min PRN Stephanie Candelario MD       • HYDROcodone-acetaminophen (NORCO)  MG per tablet 1 tablet  1 tablet Oral Q4H PRN Hanna Gatica DO   1 tablet at 05/31/22 2029   • HYDROmorphone (DILAUDID) injection 0.5 mg  0.5 mg Intravenous Q2H PRN Hanna Gatica DO   0.5 mg at 06/01/22 0720   • levothyroxine (SYNTHROID, LEVOTHROID) tablet 25 mcg  25 mcg Oral Q AM Stephanie Candelario MD   25 mcg at 05/31/22 0618   • lidocaine (LIDODERM) 5 % 1 patch  1 patch Transdermal Q24H Marianne Rubio PA   1 patch at 05/31/22 1201   • losartan (COZAAR) tablet 100 mg  100 mg Oral Q24H Stephanie Candelario MD   100 mg at 05/31/22 0802   • multivitamin with minerals 1 tablet  1 tablet Oral Daily Stephanie Candelario MD   1 tablet at 05/31/22 0802   • ondansetron (ZOFRAN) injection 4 mg  4 mg Intravenous Q6H PRN Stephanie Candelario MD   4 mg at 06/01/22 0412   • pantoprazole (PROTONIX) EC tablet 40 mg  40 mg Oral Daily Stephanie Candelario MD   40 mg at 05/31/22 0618   • polyethylene glycol (MIRALAX) packet 17 g  17 g Oral Daily PRN Hanna Gatica DO       • thiamine (VITAMIN B-1) tablet 500 mg  500 mg Oral Daily Stephanie Candelario MD   500 mg at 05/31/22 0802       --------------------------------------------------------------------------------  Assessment & Plan      Anesthesia Evaluation     Patient summary reviewed and Nursing notes reviewed   NPO Solid Status: > 8 hours  NPO Liquid Status: > 2 hours    Pain impairs ability to perform ADLs: Sleeping  Modalities previously tried to control pain with limited effectiveness within the last 4-6 weeks: Rest     Airway    Mallampati: II  TM distance: >3 FB  Neck ROM: full  Dental - normal exam     Pulmonary - negative pulmonary ROS and normal exam   Cardiovascular - normal exam    (+) hypertension, hyperlipidemia,       Neuro/Psych- neuro exam normal  (+) headaches, dizziness/light headedness, psychiatric history Anxiety,    GI/Hepatic/Renal/Endo    (+)  GERD,  thyroid problem     Musculoskeletal (-) normal exam    (+) back pain, neck pain, radiculopathy  Abdominal  - normal exam   Substance History - negative use     OB/GYN negative ob/gyn ROS         Other   arthritis,    history of cancer               Diagnosis and Plan    Treatment Plan  ASA 3      Procedures: Facet injection, With fluoroscopy,       Anesthetic plan and risks discussed with patient.      Patient has previously undergone RF facet treatments by a different pain clinic. Now with radiating right sided pain.    Diagnosis     * Lumbar facet arthropathy [M47.816]     * Lumbar radiculopathy [M54.16]

## 2022-06-02 ENCOUNTER — TELEPHONE (OUTPATIENT)
Dept: FAMILY MEDICINE CLINIC | Facility: CLINIC | Age: 77
End: 2022-06-02

## 2022-06-02 ENCOUNTER — APPOINTMENT (OUTPATIENT)
Dept: MRI IMAGING | Facility: HOSPITAL | Age: 77
End: 2022-06-02

## 2022-06-02 LAB
ANION GAP SERPL CALCULATED.3IONS-SCNC: 13.2 MMOL/L (ref 5–15)
BASOPHILS # BLD AUTO: 0.03 10*3/MM3 (ref 0–0.2)
BASOPHILS NFR BLD AUTO: 0.4 % (ref 0–1.5)
BUN SERPL-MCNC: 12 MG/DL (ref 8–23)
BUN/CREAT SERPL: 14.8 (ref 7–25)
CALCIUM SPEC-SCNC: 9.2 MG/DL (ref 8.6–10.5)
CHLORIDE SERPL-SCNC: 102 MMOL/L (ref 98–107)
CO2 SERPL-SCNC: 22.8 MMOL/L (ref 22–29)
CREAT SERPL-MCNC: 0.81 MG/DL (ref 0.76–1.27)
DEPRECATED RDW RBC AUTO: 45.1 FL (ref 37–54)
EGFRCR SERPLBLD CKD-EPI 2021: 91.4 ML/MIN/1.73
EOSINOPHIL # BLD AUTO: 0.37 10*3/MM3 (ref 0–0.4)
EOSINOPHIL NFR BLD AUTO: 4.7 % (ref 0.3–6.2)
ERYTHROCYTE [DISTWIDTH] IN BLOOD BY AUTOMATED COUNT: 13.7 % (ref 12.3–15.4)
GLUCOSE SERPL-MCNC: 101 MG/DL (ref 65–99)
HCT VFR BLD AUTO: 39.2 % (ref 37.5–51)
HGB BLD-MCNC: 13.8 G/DL (ref 13–17.7)
IMM GRANULOCYTES # BLD AUTO: 0.1 10*3/MM3 (ref 0–0.05)
IMM GRANULOCYTES NFR BLD AUTO: 1.3 % (ref 0–0.5)
LYMPHOCYTES # BLD AUTO: 1.37 10*3/MM3 (ref 0.7–3.1)
LYMPHOCYTES NFR BLD AUTO: 17.3 % (ref 19.6–45.3)
MCH RBC QN AUTO: 31.7 PG (ref 26.6–33)
MCHC RBC AUTO-ENTMCNC: 35.2 G/DL (ref 31.5–35.7)
MCV RBC AUTO: 89.9 FL (ref 79–97)
MONOCYTES # BLD AUTO: 0.53 10*3/MM3 (ref 0.1–0.9)
MONOCYTES NFR BLD AUTO: 6.7 % (ref 5–12)
NEUTROPHILS NFR BLD AUTO: 5.51 10*3/MM3 (ref 1.7–7)
NEUTROPHILS NFR BLD AUTO: 69.6 % (ref 42.7–76)
NRBC BLD AUTO-RTO: 0 /100 WBC (ref 0–0.2)
PLATELET # BLD AUTO: 251 10*3/MM3 (ref 140–450)
PMV BLD AUTO: 9.8 FL (ref 6–12)
POTASSIUM SERPL-SCNC: 4.1 MMOL/L (ref 3.5–5.2)
RBC # BLD AUTO: 4.36 10*6/MM3 (ref 4.14–5.8)
SODIUM SERPL-SCNC: 138 MMOL/L (ref 136–145)
WBC NRBC COR # BLD: 7.91 10*3/MM3 (ref 3.4–10.8)

## 2022-06-02 PROCEDURE — 25010000002 HYDROMORPHONE PER 4 MG: Performed by: STUDENT IN AN ORGANIZED HEALTH CARE EDUCATION/TRAINING PROGRAM

## 2022-06-02 PROCEDURE — 99232 SBSQ HOSP IP/OBS MODERATE 35: CPT | Performed by: NURSE PRACTITIONER

## 2022-06-02 PROCEDURE — 80048 BASIC METABOLIC PNL TOTAL CA: CPT | Performed by: INTERNAL MEDICINE

## 2022-06-02 PROCEDURE — 85025 COMPLETE CBC W/AUTO DIFF WBC: CPT | Performed by: INTERNAL MEDICINE

## 2022-06-02 PROCEDURE — 25010000002 ONDANSETRON PER 1 MG: Performed by: INTERNAL MEDICINE

## 2022-06-02 PROCEDURE — 72148 MRI LUMBAR SPINE W/O DYE: CPT

## 2022-06-02 RX ORDER — METHOCARBAMOL 500 MG/1
1000 TABLET, FILM COATED ORAL EVERY 6 HOURS SCHEDULED
Status: DISCONTINUED | OUTPATIENT
Start: 2022-06-02 | End: 2022-06-04 | Stop reason: HOSPADM

## 2022-06-02 RX ADMIN — LOSARTAN POTASSIUM 100 MG: 100 TABLET, FILM COATED ORAL at 08:30

## 2022-06-02 RX ADMIN — HYDROMORPHONE HYDROCHLORIDE 0.5 MG: 1 INJECTION, SOLUTION INTRAMUSCULAR; INTRAVENOUS; SUBCUTANEOUS at 04:28

## 2022-06-02 RX ADMIN — MULTIPLE VITAMINS W/ MINERALS TAB 1 TABLET: TAB at 08:30

## 2022-06-02 RX ADMIN — METHOCARBAMOL TABLETS 1000 MG: 750 TABLET, COATED ORAL at 12:06

## 2022-06-02 RX ADMIN — PANTOPRAZOLE SODIUM 40 MG: 40 TABLET, DELAYED RELEASE ORAL at 08:28

## 2022-06-02 RX ADMIN — FLUTICASONE PROPIONATE 2 SPRAY: 50 SPRAY, METERED NASAL at 08:30

## 2022-06-02 RX ADMIN — POLYETHYLENE GLYCOL 3350 17 G: 17 POWDER, FOR SOLUTION ORAL at 20:05

## 2022-06-02 RX ADMIN — HYDROCODONE BITARTRATE AND ACETAMINOPHEN 1 TABLET: 10; 325 TABLET ORAL at 08:28

## 2022-06-02 RX ADMIN — HYDROCODONE BITARTRATE AND ACETAMINOPHEN 1 TABLET: 10; 325 TABLET ORAL at 03:25

## 2022-06-02 RX ADMIN — LIDOCAINE 1 PATCH: 50 PATCH TOPICAL at 08:28

## 2022-06-02 RX ADMIN — AMLODIPINE BESYLATE 5 MG: 5 TABLET ORAL at 08:30

## 2022-06-02 RX ADMIN — LEVOTHYROXINE SODIUM 25 MCG: 0.03 TABLET ORAL at 05:15

## 2022-06-02 RX ADMIN — Medication 500 MG: at 08:29

## 2022-06-02 RX ADMIN — HYDROCODONE BITARTRATE AND ACETAMINOPHEN 1 TABLET: 10; 325 TABLET ORAL at 16:12

## 2022-06-02 RX ADMIN — METHOCARBAMOL TABLETS 1000 MG: 750 TABLET, COATED ORAL at 18:10

## 2022-06-02 RX ADMIN — HYDROMORPHONE HYDROCHLORIDE 0.5 MG: 1 INJECTION, SOLUTION INTRAMUSCULAR; INTRAVENOUS; SUBCUTANEOUS at 12:06

## 2022-06-02 RX ADMIN — GABAPENTIN 400 MG: 400 CAPSULE ORAL at 20:02

## 2022-06-02 RX ADMIN — CETIRIZINE HYDROCHLORIDE 10 MG: 10 TABLET ORAL at 08:30

## 2022-06-02 RX ADMIN — ATORVASTATIN CALCIUM 10 MG: 20 TABLET, FILM COATED ORAL at 08:29

## 2022-06-02 RX ADMIN — HYDROMORPHONE HYDROCHLORIDE 0.5 MG: 1 INJECTION, SOLUTION INTRAMUSCULAR; INTRAVENOUS; SUBCUTANEOUS at 14:08

## 2022-06-02 RX ADMIN — GABAPENTIN 400 MG: 400 CAPSULE ORAL at 08:29

## 2022-06-02 RX ADMIN — HYDROCODONE BITARTRATE AND ACETAMINOPHEN 1 TABLET: 10; 325 TABLET ORAL at 20:02

## 2022-06-02 RX ADMIN — HYDROMORPHONE HYDROCHLORIDE 0.5 MG: 1 INJECTION, SOLUTION INTRAMUSCULAR; INTRAVENOUS; SUBCUTANEOUS at 09:41

## 2022-06-02 RX ADMIN — GABAPENTIN 400 MG: 400 CAPSULE ORAL at 16:12

## 2022-06-02 RX ADMIN — METHOCARBAMOL TABLETS 750 MG: 750 TABLET, COATED ORAL at 05:15

## 2022-06-02 RX ADMIN — ONDANSETRON 4 MG: 2 INJECTION INTRAMUSCULAR; INTRAVENOUS at 12:06

## 2022-06-02 NOTE — PLAN OF CARE
Goal Outcome Evaluation:  Plan of Care Reviewed With: patient        Progress: improving  Outcome Evaluation: VSS: PRN zofran given x1; Possible discharge today; will conitnue to tracey

## 2022-06-02 NOTE — TELEPHONE ENCOUNTER
Caller: No Crespo    Relationship: Emergency Contact    Best call back number: [phone]    What medication are you requesting: DIAZAPAM (VALIUM) 2 MG    What are your current symptoms: PATIENT STILL TAKES THIS MEDICATION EVERY NOW AND THEN FOR THE MENIERE'S DISEASE.    Additional notes: WIFE WANTED DR. ROSE TO KNOW THAT HER  DOES TAKE THIS MEDICATION EVERY NOW AND THEN . PLEASE ADD BACK TO HIS MEDICATION LIST.

## 2022-06-02 NOTE — PROGRESS NOTES
NEUROSURGERY PROGRESS NOTE     LOS: 0 days   Patient Care Team:  Nathan Arango MD as PCP - General  Nathan Arango MD as PCP - Family Medicine  Nena Ramey APRN (Dermatology)    Chief Complaint:  Lumbar pain; L leg pain    Subjective       Interval History: States stabbing pain in L side of back is gone. Now having significant pain mid lower portion of back and down the lateral hip, thigh to the L knee. Reiterates having this pain since RFA ablation procedure in early April. MRI done May 1st. Denies bowel or bladder incontinence but pain so bad he cannot get comfortable in bed. Denies numbness or tingling in legs. Denies bowel or bladder in continence.     History taken from: patient chart    Objective        Vital Signs  Temp:  [97.2 °F (36.2 °C)-97.8 °F (36.6 °C)] 97.8 °F (36.6 °C)  Heart Rate:  [70-80] 70  Resp:  [16-18] 17  BP: (129-158)/(76-87) 158/87     Physical Exam:     Tender to palpation at L2, L3  No motor or sensory deficits.   No long tract signs.   Negative SLR bilaterally.        Results Review:     I reviewed the patient's new clinical results.     .  Results from last 7 days   Lab Units 06/02/22 0519 05/30/22  0559 05/29/22  1747   WBC 10*3/mm3 7.91 8.22 7.03   HEMOGLOBIN g/dL 13.8 14.0 13.9   HEMATOCRIT % 39.2 41.3 39.2   PLATELETS 10*3/mm3 251 240 238     .  Results from last 7 days   Lab Units 06/02/22 0519 05/30/22  0559 05/29/22  1747   SODIUM mmol/L 138 139 140   POTASSIUM mmol/L 4.1 4.0 4.0   CHLORIDE mmol/L 102 106 105   CO2 mmol/L 22.8 23.0 23.0   BUN mg/dL 12 10 12   CREATININE mg/dL 0.81 0.82 0.89   GLUCOSE mg/dL 101* 132* 104*   CALCIUM mg/dL 9.2 9.3 9.2       Assessment & Plan       Intractable low back pain    Benign essential HTN    Acid reflux    DDD (degenerative disc disease), lumbar    Acquired hypothyroidism    Opioid dependence (HCC)       Check MRI lumbar spine.      Continue Robaxin; increase to 1,000 mg q 6 hrs      Add warm n form brace for comfort.     Continue  to mobilize    An OTS Warm and Form brace has been ordered due to diagnosis of Intractable Lumbar Pain. The purpose of the brace is to support weak muscles, stabilize and restrict movement of the trunk to aid in healing and pain relief of lumbar spine pain.       Mariya Saba, MICHELA  06/02/22  09:50 EDT

## 2022-06-02 NOTE — PROGRESS NOTES
Name: Kali Crespo ADMIT: 2022   : 1945  PCP: Nathan Arango MD    MRN: [id number] LOS: 0 days   AGE/SEX: 76 y.o. male  ROOM: [facility]     Subjective   Subjective   Patient is lying on the bed and is complaining of low back pain.  Denies nausea, vomiting abdominal pain, chest pain.       Objective   Objective   Vital Signs  Temp:  [97.2 °F (36.2 °C)-97.9 °F (36.6 °C)] 97.9 °F (36.6 °C)  Heart Rate:  [70-80] 75  Resp:  [16-18] 16  BP: (129-158)/(76-87) 152/85  SpO2:  [98 %-99 %] 99 %  on   ;   Device (Oxygen Therapy): room air  Body mass index is 27.62 kg/m².  Physical Exam  Constitutional:       General: He is not in acute distress.     Appearance: Normal appearance. He is not ill-appearing.   HENT:      Head: Normocephalic and atraumatic.      Mouth/Throat:      Mouth: Mucous membranes are moist.   Eyes:      Extraocular Movements: Extraocular movements intact.      Pupils: Pupils are equal, round, and reactive to light.   Cardiovascular:      Rate and Rhythm: Normal rate and regular rhythm.      Pulses: Normal pulses.      Heart sounds: Normal heart sounds.   Pulmonary:      Effort: Pulmonary effort is normal. No respiratory distress.      Breath sounds: No wheezing or rhonchi.   Abdominal:      General: Bowel sounds are normal.      Palpations: Abdomen is soft.      Tenderness: There is no abdominal tenderness. There is no guarding.   Musculoskeletal:      Cervical back: Normal range of motion and neck supple.      Right lower leg: No edema.      Left lower leg: No edema.   Skin:     General: Skin is warm and dry.   Neurological:      General: No focal deficit present.      Mental Status: He is alert and oriented to person, place, and time.      Cranial Nerves: No cranial nerve deficit.   Psychiatric:         Mood and Affect: Mood normal.         Results Review     I reviewed the patient's new clinical results.  Results from last 7 days   Lab Units 22  0519 22  0559  05/29/22  1747   WBC 10*3/mm3 7.91 8.22 7.03   HEMOGLOBIN g/dL 13.8 14.0 13.9   PLATELETS 10*3/mm3 251 240 238     Results from last 7 days   Lab Units 06/02/22  0519 05/30/22  0559 05/29/22  1747   SODIUM mmol/L 138 139 140   POTASSIUM mmol/L 4.1 4.0 4.0   CHLORIDE mmol/L 102 106 105   CO2 mmol/L 22.8 23.0 23.0   BUN mg/dL 12 10 12   CREATININE mg/dL 0.81 0.82 0.89   GLUCOSE mg/dL 101* 132* 104*   Estimated Creatinine Clearance: 95.8 mL/min (by C-G formula based on SCr of 0.81 mg/dL).  Results from last 7 days   Lab Units 05/29/22  1747   ALBUMIN g/dL 3.80   BILIRUBIN mg/dL 0.4   ALK PHOS U/L 86   AST (SGOT) U/L 18   ALT (SGPT) U/L 24     Results from last 7 days   Lab Units 06/02/22  0519 05/30/22  0559 05/29/22  1747   CALCIUM mg/dL 9.2 9.3 9.2   ALBUMIN g/dL  --   --  3.80       COVID19   Date Value Ref Range Status   05/29/2022 Not Detected Not Detected - Ref. Range Final     SARS-CoV-2, MARGOTH   Date Value Ref Range Status   01/26/2022 Not Detected Not Detected Final     Comment:     This nucleic acid amplification test was developed and its performance  characteristics determined by [employer]. Nucleic acid  amplification tests include RT-PCR and TMA. This test has not been  FDA cleared or approved. This test has been authorized by FDA under  an Emergency Use Authorization (EUA). This test is only authorized  for the duration of time the declaration that circumstances exist  justifying the authorization of the emergency use of in vitro  diagnostic tests for detection of SARS-CoV-2 virus and/or diagnosis  of COVID-19 infection under section 564(b)(1) of the Act, 21 U.S.C.  360bbb-3(b) (1), unless the authorization is terminated or revoked  sooner.  When diagnostic testing is negative, the possibility of a false  negative result should be considered in the context of a patient's  recent exposures and the presence of clinical signs and symptoms  consistent with COVID-19. An individual without symptoms of  COVID-19  and who is not shedding SARS-CoV-2 virus would expect to have a  negative (not detected) result in this assay.       No results found for: HGBA1C, POCGLU    [facility] Guided Pain Management Spine  This procedure was auto-finalized with no dictation required.    I reviewed the patient's daily medications.  Scheduled Medications  amLODIPine, 5 mg, Oral, Daily  atorvastatin, 10 mg, Oral, Daily  bupivacaine (PF), 2 mL, Infiltration, Once  cetirizine, 10 mg, Oral, Daily  fluticasone, 2 spray, Nasal, Daily  gabapentin, 400 mg, Oral, TID  levothyroxine, 25 mcg, Oral, Q AM  lidocaine, 1 patch, Transdermal, Q24H  lidocaine, 1 mL, Intradermal, Once  losartan, 100 mg, Oral, Q24H  methocarbamol, 1,000 mg, Oral, Q6H  multivitamin with minerals, 1 tablet, Oral, Daily  pantoprazole, 40 mg, Oral, Daily  thiamine, 500 mg, Oral, Daily    Infusions   Diet  Diet Regular; Cardiac       Assessment/Plan     Active Hospital Problems    Diagnosis  POA   • **Intractable low back pain [M54.59]  Yes   • Opioid dependence (HCC) [F11.20]  Yes   • Acquired hypothyroidism [E03.9]  Yes   • DDD (degenerative disc disease), lumbar [M51.36]  Yes   • Benign essential HTN [I10]  Yes   • Acid reflux [K21.9]  Yes      Resolved Hospital Problems   No resolved problems to display.       76 y.o. male with history of chronic low back pain due to degenerative disc disease who presented with severe and intractable left-sided low back pain.      1.Lumbar radiculopathy, continue with analgesics which include Norco and IV Dilaudid for breakthrough pain.  Did undergo spinal injection on 6/1 despite which she continues to complain of low back pain and MRI of the lumbar spine is being ordered and neurosurgery is following along as well.  2.  Hypertension, continue with amlodipine and losartan.  3.  Hyperglycemia steroid-induced.  4.  Hypothyroidism, on Synthroid.    · SCDs for DVT prophylaxis.  · Full code.  · Discussed with patient and nursing  staff.  · Anticipate discharge home in 1-2 days. Pending pain management evaluation.  PT consult      Marvel Noonan MD  [facility]  06/02/22  17:11 EDT    Patient was placed in face mask on first look.  I wore protective equipment throughout this patient encounter including a face mask, gloves and protective eyewear.  Hand hygiene was performed before donning protective equipment and after removal when leaving the room.

## 2022-06-02 NOTE — TELEPHONE ENCOUNTER
PATIENT SEEN  5/11/2022   PER DR ROSE - Please add this back to his med list. See my prior prescribing hx for dose. : DIAZAPAM (VALIUM) 2 MG  TAKE ONE TABLET EVERY 12 HOURS AS NEEDED FOR VERTIGO

## 2022-06-02 NOTE — PLAN OF CARE
Goal Outcome Evaluation:               Pt 75 y/o male came in through the ED for back pain on 5/29. Aox4, VSS on RA. Up ad derek. Pt got back brace ordered today, to wear when OOB. Skin is free of bruising or pressure sores. Pt got lumbar injection yesterday with a puncture wound and bandaid on back. Pt states that injection gave him relief for a while, but today the pain is back up to 10/10. Pt gets dilaudid q2h and norco q4 to help with pain, minimal relief noted. Pt refuses SCDs, MD gómez previously notified. Voids spontaneously without difficulty, LBM was 5/31, regular cardiac diet, pt complains of intermittent nausea sometimes after pain medication, zofran given with relief. IV in SL on rt AC. Plan is to get another MRI today. Will continue to monitor.    Delaney Cui BSN, RN

## 2022-06-03 ENCOUNTER — ANESTHESIA (OUTPATIENT)
Dept: PAIN MEDICINE | Facility: HOSPITAL | Age: 77
End: 2022-06-03

## 2022-06-03 ENCOUNTER — ANESTHESIA EVENT (OUTPATIENT)
Dept: PAIN MEDICINE | Facility: HOSPITAL | Age: 77
End: 2022-06-03

## 2022-06-03 ENCOUNTER — APPOINTMENT (OUTPATIENT)
Dept: PAIN MEDICINE | Facility: HOSPITAL | Age: 77
End: 2022-06-03

## 2022-06-03 ENCOUNTER — APPOINTMENT (OUTPATIENT)
Dept: GENERAL RADIOLOGY | Facility: HOSPITAL | Age: 77
End: 2022-06-03

## 2022-06-03 LAB
ANION GAP SERPL CALCULATED.3IONS-SCNC: 8.4 MMOL/L (ref 5–15)
BASOPHILS # BLD AUTO: 0.05 10*3/MM3 (ref 0–0.2)
BASOPHILS NFR BLD AUTO: 0.7 % (ref 0–1.5)
BUN SERPL-MCNC: 14 MG/DL (ref 8–23)
BUN/CREAT SERPL: 17.3 (ref 7–25)
CALCIUM SPEC-SCNC: 8.7 MG/DL (ref 8.6–10.5)
CHLORIDE SERPL-SCNC: 105 MMOL/L (ref 98–107)
CO2 SERPL-SCNC: 23.6 MMOL/L (ref 22–29)
CREAT SERPL-MCNC: 0.81 MG/DL (ref 0.76–1.27)
DEPRECATED RDW RBC AUTO: 47.5 FL (ref 37–54)
EGFRCR SERPLBLD CKD-EPI 2021: 91.4 ML/MIN/1.73
EOSINOPHIL # BLD AUTO: 0.5 10*3/MM3 (ref 0–0.4)
EOSINOPHIL NFR BLD AUTO: 6.7 % (ref 0.3–6.2)
ERYTHROCYTE [DISTWIDTH] IN BLOOD BY AUTOMATED COUNT: 14 % (ref 12.3–15.4)
GLUCOSE SERPL-MCNC: 94 MG/DL (ref 65–99)
HCT VFR BLD AUTO: 40.9 % (ref 37.5–51)
HGB BLD-MCNC: 13.9 G/DL (ref 13–17.7)
IMM GRANULOCYTES # BLD AUTO: 0.12 10*3/MM3 (ref 0–0.05)
IMM GRANULOCYTES NFR BLD AUTO: 1.6 % (ref 0–0.5)
LYMPHOCYTES # BLD AUTO: 1.44 10*3/MM3 (ref 0.7–3.1)
LYMPHOCYTES NFR BLD AUTO: 19.2 % (ref 19.6–45.3)
MCH RBC QN AUTO: 31.4 PG (ref 26.6–33)
MCHC RBC AUTO-ENTMCNC: 34 G/DL (ref 31.5–35.7)
MCV RBC AUTO: 92.5 FL (ref 79–97)
MONOCYTES # BLD AUTO: 0.47 10*3/MM3 (ref 0.1–0.9)
MONOCYTES NFR BLD AUTO: 6.3 % (ref 5–12)
NEUTROPHILS NFR BLD AUTO: 4.92 10*3/MM3 (ref 1.7–7)
NEUTROPHILS NFR BLD AUTO: 65.5 % (ref 42.7–76)
NRBC BLD AUTO-RTO: 0 /100 WBC (ref 0–0.2)
PLATELET # BLD AUTO: 235 10*3/MM3 (ref 140–450)
PMV BLD AUTO: 9.5 FL (ref 6–12)
POTASSIUM SERPL-SCNC: 4.1 MMOL/L (ref 3.5–5.2)
RBC # BLD AUTO: 4.42 10*6/MM3 (ref 4.14–5.8)
SODIUM SERPL-SCNC: 137 MMOL/L (ref 136–145)
WBC NRBC COR # BLD: 7.5 10*3/MM3 (ref 3.4–10.8)

## 2022-06-03 PROCEDURE — 0 IOPAMIDOL 41 % SOLUTION: Performed by: ANESTHESIOLOGY

## 2022-06-03 PROCEDURE — 3E0R3BZ INTRODUCTION OF ANESTHETIC AGENT INTO SPINAL CANAL, PERCUTANEOUS APPROACH: ICD-10-PCS | Performed by: ANESTHESIOLOGY

## 2022-06-03 PROCEDURE — 77003 FLUOROGUIDE FOR SPINE INJECT: CPT

## 2022-06-03 PROCEDURE — 99231 SBSQ HOSP IP/OBS SF/LOW 25: CPT

## 2022-06-03 PROCEDURE — 85025 COMPLETE CBC W/AUTO DIFF WBC: CPT | Performed by: INTERNAL MEDICINE

## 2022-06-03 PROCEDURE — 25010000002 HYDROMORPHONE PER 4 MG: Performed by: STUDENT IN AN ORGANIZED HEALTH CARE EDUCATION/TRAINING PROGRAM

## 2022-06-03 PROCEDURE — 80048 BASIC METABOLIC PNL TOTAL CA: CPT | Performed by: INTERNAL MEDICINE

## 2022-06-03 PROCEDURE — 25010000002 METHYLPREDNISOLONE PER 80 MG: Performed by: ANESTHESIOLOGY

## 2022-06-03 RX ORDER — SODIUM CHLORIDE 0.9 % (FLUSH) 0.9 %
1-10 SYRINGE (ML) INJECTION AS NEEDED
Status: DISCONTINUED | OUTPATIENT
Start: 2022-06-03 | End: 2022-06-04 | Stop reason: HOSPADM

## 2022-06-03 RX ORDER — LIDOCAINE HYDROCHLORIDE 10 MG/ML
1 INJECTION, SOLUTION INFILTRATION; PERINEURAL ONCE AS NEEDED
Status: DISCONTINUED | OUTPATIENT
Start: 2022-06-03 | End: 2022-06-03

## 2022-06-03 RX ORDER — METHYLPREDNISOLONE ACETATE 80 MG/ML
80 INJECTION, SUSPENSION INTRA-ARTICULAR; INTRALESIONAL; INTRAMUSCULAR; SOFT TISSUE ONCE
Status: COMPLETED | OUTPATIENT
Start: 2022-06-03 | End: 2022-06-03

## 2022-06-03 RX ORDER — FENTANYL CITRATE 50 UG/ML
50 INJECTION, SOLUTION INTRAMUSCULAR; INTRAVENOUS AS NEEDED
Status: DISCONTINUED | OUTPATIENT
Start: 2022-06-03 | End: 2022-06-03

## 2022-06-03 RX ORDER — MIDAZOLAM HYDROCHLORIDE 1 MG/ML
1 INJECTION INTRAMUSCULAR; INTRAVENOUS AS NEEDED
Status: DISCONTINUED | OUTPATIENT
Start: 2022-06-03 | End: 2022-06-03

## 2022-06-03 RX ADMIN — METHYLPREDNISOLONE ACETATE 80 MG: 80 INJECTION, SUSPENSION INTRA-ARTICULAR; INTRALESIONAL; INTRAMUSCULAR; SOFT TISSUE at 13:51

## 2022-06-03 RX ADMIN — METHOCARBAMOL TABLETS 1000 MG: 750 TABLET, COATED ORAL at 05:47

## 2022-06-03 RX ADMIN — LIDOCAINE 1 PATCH: 50 PATCH TOPICAL at 08:44

## 2022-06-03 RX ADMIN — LOSARTAN POTASSIUM 100 MG: 100 TABLET, FILM COATED ORAL at 08:43

## 2022-06-03 RX ADMIN — GABAPENTIN 400 MG: 400 CAPSULE ORAL at 20:38

## 2022-06-03 RX ADMIN — HYDROCODONE BITARTRATE AND ACETAMINOPHEN 1 TABLET: 10; 325 TABLET ORAL at 10:17

## 2022-06-03 RX ADMIN — METHOCARBAMOL TABLETS 1000 MG: 750 TABLET, COATED ORAL at 11:50

## 2022-06-03 RX ADMIN — HYDROCODONE BITARTRATE AND ACETAMINOPHEN 1 TABLET: 10; 325 TABLET ORAL at 00:06

## 2022-06-03 RX ADMIN — AMLODIPINE BESYLATE 5 MG: 5 TABLET ORAL at 08:43

## 2022-06-03 RX ADMIN — GABAPENTIN 400 MG: 400 CAPSULE ORAL at 08:43

## 2022-06-03 RX ADMIN — HYDROCODONE BITARTRATE AND ACETAMINOPHEN 1 TABLET: 10; 325 TABLET ORAL at 19:19

## 2022-06-03 RX ADMIN — HYDROCODONE BITARTRATE AND ACETAMINOPHEN 1 TABLET: 10; 325 TABLET ORAL at 23:02

## 2022-06-03 RX ADMIN — METHOCARBAMOL TABLETS 1000 MG: 750 TABLET, COATED ORAL at 23:02

## 2022-06-03 RX ADMIN — ATORVASTATIN CALCIUM 10 MG: 20 TABLET, FILM COATED ORAL at 08:43

## 2022-06-03 RX ADMIN — LEVOTHYROXINE SODIUM 25 MCG: 0.03 TABLET ORAL at 05:47

## 2022-06-03 RX ADMIN — GABAPENTIN 400 MG: 400 CAPSULE ORAL at 15:13

## 2022-06-03 RX ADMIN — FLUTICASONE PROPIONATE 2 SPRAY: 50 SPRAY, METERED NASAL at 08:54

## 2022-06-03 RX ADMIN — PANTOPRAZOLE SODIUM 40 MG: 40 TABLET, DELAYED RELEASE ORAL at 08:43

## 2022-06-03 RX ADMIN — METHOCARBAMOL TABLETS 1000 MG: 750 TABLET, COATED ORAL at 00:05

## 2022-06-03 RX ADMIN — HYDROCODONE BITARTRATE AND ACETAMINOPHEN 1 TABLET: 10; 325 TABLET ORAL at 15:13

## 2022-06-03 RX ADMIN — CETIRIZINE HYDROCHLORIDE 10 MG: 10 TABLET ORAL at 08:43

## 2022-06-03 RX ADMIN — HYDROCODONE BITARTRATE AND ACETAMINOPHEN 1 TABLET: 10; 325 TABLET ORAL at 05:47

## 2022-06-03 RX ADMIN — HYDROMORPHONE HYDROCHLORIDE 0.5 MG: 1 INJECTION, SOLUTION INTRAMUSCULAR; INTRAVENOUS; SUBCUTANEOUS at 03:36

## 2022-06-03 RX ADMIN — IOPAMIDOL 10 ML: 408 INJECTION, SOLUTION INTRATHECAL at 13:51

## 2022-06-03 RX ADMIN — METHOCARBAMOL TABLETS 1000 MG: 750 TABLET, COATED ORAL at 19:19

## 2022-06-03 RX ADMIN — MULTIPLE VITAMINS W/ MINERALS TAB 1 TABLET: TAB at 08:43

## 2022-06-03 NOTE — PROGRESS NOTES
Name: Kali Crespo ADMIT: 2022   : 1945  PCP: Nathan Arango MD    MRN: [id number] LOS: 1 days   AGE/SEX: 76 y.o. male  ROOM: [facility]     Subjective   Subjective   Still using IV dilaudid for intractable pain.    Otherwise he is doing well.  Able to ambulate.  He received a warm and form brace yesterday which he said helped    MRI L-Spine with mild findings.     Review of Systems      GENERAL: No fevers, chills, sweats.    RESPIRATORY: No cough, shortness of breath, hemoptysis or wheezing.   CVS: No chest pain, palpitations, orthopnea, dyspnea on exertion   GI: No melena or hematochezia. No abdominal pain. No nausea, vomiting, constipation, diarrhea  MSK: + back pain      Objective   Objective   Vital Signs  Temp:  [97.3 °F (36.3 °C)-98.5 °F (36.9 °C)] 97.3 °F (36.3 °C)  Heart Rate:  [69-82] 69  Resp:  [16] 16  BP: (131-152)/(74-93) 150/75  SpO2:  [95 %-99 %] 98 %  on   ;   Device (Oxygen Therapy): room air  Body mass index is 27.62 kg/m².  Physical Exam    General: Alert and oriented x3, no acute distress  HEENT: Normocephalic, atraumatic  CV: Regular rate and rhythm, no murmurs rubs or gallops  Lungs: Clear to auscultation bilaterally, no crackles or wheezes  Abdomen: Soft, nontender, nondistended  Neuro: CN II-XII intact, no FND appreciated     Results Review     I reviewed the patient's new clinical results.  Results from last 7 days   Lab Units 22  0503 22  0522  0559 22  1747   WBC 10*3/mm3 7.50 7.91 8.22 7.03   HEMOGLOBIN g/dL 13.9 13.8 14.0 13.9   PLATELETS 10*3/mm3 235 251 240 238     Results from last 7 days   Lab Units 22  0503 22  0519 22  0559 22  1747   SODIUM mmol/L 137 138 139 140   POTASSIUM mmol/L 4.1 4.1 4.0 4.0   CHLORIDE mmol/L 105 102 106 105   CO2 mmol/L 23.6 22.8 23.0 23.0   BUN mg/dL 14 12 10 12   CREATININE mg/dL 0.81 0.81 0.82 0.89   GLUCOSE mg/dL 94 101* 132* 104*   Estimated Creatinine Clearance: 95.8 mL/min (by  C-G formula based on SCr of 0.81 mg/dL).  Results from last 7 days   Lab Units 05/29/22  1747   ALBUMIN g/dL 3.80   BILIRUBIN mg/dL 0.4   ALK PHOS U/L 86   AST (SGOT) U/L 18   ALT (SGPT) U/L 24     Results from last 7 days   Lab Units 06/03/22  0503 06/02/22  0519 05/30/22  0559 05/29/22  1747   CALCIUM mg/dL 8.7 9.2 9.3 9.2   ALBUMIN g/dL  --   --   --  3.80       COVID19   Date Value Ref Range Status   05/29/2022 Not Detected Not Detected - Ref. Range Final     SARS-CoV-2, MARGOTH   Date Value Ref Range Status   01/26/2022 Not Detected Not Detected Final     Comment:     This nucleic acid amplification test was developed and its performance  characteristics determined by [employer]. Nucleic acid  amplification tests include RT-PCR and TMA. This test has not been  FDA cleared or approved. This test has been authorized by FDA under  an Emergency Use Authorization (EUA). This test is only authorized  for the duration of time the declaration that circumstances exist  justifying the authorization of the emergency use of in vitro  diagnostic tests for detection of SARS-CoV-2 virus and/or diagnosis  of COVID-19 infection under section 564(b)(1) of the Act, 21 U.S.C.  360bbb-3(b) (1), unless the authorization is terminated or revoked  sooner.  When diagnostic testing is negative, the possibility of a false  negative result should be considered in the context of a patient's  recent exposures and the presence of clinical signs and symptoms  consistent with COVID-19. An individual without symptoms of COVID-19  and who is not shedding SARS-CoV-2 virus would expect to have a  negative (not detected) result in this assay.       No results found for: HGBA1C, POCGLU    MRI Lumbar Spine Without Contrast  Narrative: MRI LUMBAR SPINE WO CONTRAST-  06/02/2022     HISTORY: Back pain.     TECHNIQUE: Multiple noncontrast sagittal and axial images were obtained  through the lumbar spine.     FINDINGS:      There is transitional  anatomy at the lumbosacral junction. For purposes  of this dictation the lowermost liquefied disc is labeled as S1-S2.  Previous MRI of the lumbar spine dated 05/01/2022 used this same  labeling scheme.     At L1-2 there is a mild broad-based disc bulge with some mild flattening  of the anterior thecal sac. Mild facet joint arthropathy is seen with  minimal canal stenosis.     At L2-3 there is a minimal broad-based disc bulge with no significant  canal stenosis.     At L3-4 there is minimal broad-based disc bulge with no significant  canal stenosis.     At L4-5 there is a mild broad-based disc bulge with mild foraminal  narrowing and facet joint arthropathy. Minimal concentric canal stenosis  is seen.     At L5-S1 there is a moderate size posterior central disc protrusion with  some flattening of the anterior thecal sac. There is mild foraminal  narrowing, facet joint arthropathy and mild canal stenosis.     These findings appear largely unchanged from the 05/01/2022 study.     Lower cord and conus appear normal. Signal intensity of the bony  structures is within normal limits.     Impression: 1. Mild multilevel lumbar degenerative disc disease including an element  of minimal canal stenosis. This disc disease is most prominent in the  posterior central position at the L5-S1 level.  2. Findings appear similar to the previous study of 05/01/2022.        This report was finalized on 6/2/2022 8:56 PM by Dr. Kimani Ewing M.D.       Scheduled Medications  amLODIPine, 5 mg, Oral, Daily  atorvastatin, 10 mg, Oral, Daily  bupivacaine (PF), 2 mL, Infiltration, Once  cetirizine, 10 mg, Oral, Daily  fluticasone, 2 spray, Nasal, Daily  gabapentin, 400 mg, Oral, TID  levothyroxine, 25 mcg, Oral, Q AM  lidocaine, 1 patch, Transdermal, Q24H  lidocaine, 1 mL, Intradermal, Once  losartan, 100 mg, Oral, Q24H  methocarbamol, 1,000 mg, Oral, Q6H  multivitamin with minerals, 1 tablet, Oral, Daily  pantoprazole, 40 mg, Oral,  Daily  thiamine, 500 mg, Oral, Daily    Infusions   Diet  Diet Regular; Cardiac       Assessment/Plan     Active Hospital Problems    Diagnosis  POA   • **Intractable low back pain [M54.59]  Yes   • Opioid dependence (HCC) [F11.20]  Yes   • Acquired hypothyroidism [E03.9]  Yes   • DDD (degenerative disc disease), lumbar [M51.36]  Yes   • Benign essential HTN [I10]  Yes   • Acid reflux [K21.9]  Yes      Resolved Hospital Problems   No resolved problems to display.       76 y.o. male admitted with Intractable low back pain.    Lumbar radiculopathy  Intractable lower back pain  Opiate dependence  discontinue Dilaudid, continue Norco.  Monitor vitals  Underwent spinal injection on 6/1, still experiencing back pain  Neurosurgery is following  MRI of lumbar spine showed mild multilevel lumbar degenerative disc disease including an element of minimal canal stenosis, disc disease is most prominent in the posterior central portion at the L5/S1 level.  Similar to study performed 1 month ago.  Neurosurgery is following, and OTS warm and form brace has been ordered to support weak muscles stabilizing his restrict movement of the trunk      Marv Galvez MD  [facility]  06/03/22  09:39 EDT    Patient was wearing facemask when I entered the room and throughout our encounter.  I wore protective equipment throughout this patient encounter including a face mask, gloves and protective eyewear.  Hand hygiene was performed before donning protective equipment and after removal when leaving the room.

## 2022-06-03 NOTE — PLAN OF CARE
Goal Outcome Evaluation:  Plan of Care Reviewed With: patient        Progress: no change  Outcome Evaluation: Pt remains stable. Up ad derek. Pain improving some, no dilaudid required this shift. Voiding without difficulty. Miralax given to aide with constipation. Educated on htn management. Brace worn when up. DC home in 1-2 days. WCTM.

## 2022-06-03 NOTE — INTERVAL H&P NOTE
[facility]  H&P reviewed. No changes since last visit.  Patient states   % improvement since the last procedure/injection.    Diagnosis     * Lumbar radiculopathy [M54.16]      Airway assessed since last visit. Airway class equals: 1.  He says that the facet joint block gave him nearly 100% relief of his axial back pain.  He still continues to have radicular symptoms in the anterior lateral aspect of his leg.  He gets frequent epidural steroid injections and we have been asked by neurosurgery to perform epidural steroid injection as well today.  He agrees to proceed.  He has bilateral radicular symptoms with the left being worse than the right

## 2022-06-03 NOTE — ANESTHESIA PROCEDURE NOTES
PAIN Epidural block    Pre-sedation assessment completed: 6/3/2022 1:46 PM    Patient reassessed immediately prior to procedure    Patient location during procedure: pain clinic  Start Time: 6/3/2022 1:46 PM  Stop Time: 6/3/2022 1:52 PM  Indication:at surgeon's request and procedure for pain  Performed By  Anesthesiologist: Kyaw Saba MD  Preanesthetic Checklist  Completed: patient identified, site marked, risks and benefits discussed, surgical consent, monitors and equipment checked, pre-op evaluation and timeout performed  Additional Notes  Post-Op Diagnosis Codes:     * Lumbar radiculopathy (M54.16)    Prep:  Pt Position:prone  Sterile Tech:sterile barrier, mask and gloves  Prep:chlorhexidine gluconate and isopropyl alcohol  Monitoring:blood pressure monitoring, continuous pulse oximetry and EKG  Procedure:  Approach:left paramedian  Guidance: fluoroscopy  Level:3-4  Needle Gauge:20 G  Aspiration:negative  Test Dose:negative  Medications:  Isovue:2mL  Depomedrol:80mg  Post Assessment:  Pt Tolerance:patient tolerated the procedure well with no apparent complications  Complications:no

## 2022-06-03 NOTE — PROGRESS NOTES
[facility] NEUROSURGERY PROGRESS NOTE      CC:Lumbar pain; L leg pain      Subjective     Interval History: Stated neck pain is the best its been.  OTS warm-and-form brace significantly helping back pain.  No new complaints.  No acute events overnight.  Denies groin or bilateral leg N/T.  Denies bilateral leg weakness.  Denied balance issues or issues with ambulation.    ROS:  MS: + back pain  GI: no bowel incontinence  Neuro: No numbness, tingling, or weakness, no balance difficulties  :  no incontinence    Objective     Vital signs in last 24 hours:  Temp:  [97.3 °F (36.3 °C)-98.5 °F (36.9 °C)] 97.6 °F (36.4 °C)  Heart Rate:  [69-82] 72  Resp:  [16] 16  BP: (131-152)/(74-93) 142/77    Intake/Output this shift:  I/O this shift:  In: 240 [P.O.:240]  Out: -     LABS:  Results from last 7 days   Lab Units 06/03/22  0503 06/02/22 0519 05/30/22  0559   WBC 10*3/mm3 7.50 7.91 8.22   HEMOGLOBIN g/dL 13.9 13.8 14.0   HEMATOCRIT % 40.9 39.2 41.3   PLATELETS 10*3/mm3 235 251 240     Results from last 7 days   Lab Units 06/03/22  0503 06/02/22  0519 05/30/22  0559 05/29/22  1747   SODIUM mmol/L 137 138 139 140   POTASSIUM mmol/L 4.1 4.1 4.0 4.0   CHLORIDE mmol/L 105 102 106 105   CO2 mmol/L 23.6 22.8 23.0 23.0   BUN mg/dL 14 12 10 12   CREATININE mg/dL 0.81 0.81 0.82 0.89   CALCIUM mg/dL 8.7 9.2 9.3 9.2   BILIRUBIN mg/dL  --   --   --  0.4   ALK PHOS U/L  --   --   --  86   ALT (SGPT) U/L  --   --   --  24   AST (SGOT) U/L  --   --   --  18   GLUCOSE mg/dL 94 101* 132* 104*       IMAGING STUDIES:  MRI Lumbar Spine Without Contrast    Result Date: 6/2/2022  1. Mild multilevel lumbar degenerative disc disease including an element of minimal canal stenosis. This disc disease is most prominent in the posterior central position at the L5-S1 level. 2. Findings appear similar to the previous study of 05/01/2022.   This report was finalized on 6/2/2022 8:56 PM by Dr. Kimani Ewing M.D.        I personally viewed and interpreted  "the patient's MRI lumbar spine. Agree with radiology.    Meds reviewed: Yes      Physical Exam:    General:   Awake, alert, oriented x3. Speech clear with no aphasia  Back:    Warm and form brace On while sitting in chair.     Motor: 5/5 bilateral dorsiflexion/plantarflexion in bilateral iliopsoas strength.  No fasciculations, rigidity, spasticity, or abnormal movements.  Reflexes: No clonus  Sensation: Normal to light touch bilateral LE  Station and Gait:             Deferred to PT and nursing due to back pain      Assessment & Plan     ASSESSMENT:      Intractable low back pain    Benign essential HTN    Acid reflux    DDD (degenerative disc disease), lumbar    Acquired hypothyroidism    Opioid dependence (HCC)      PLAN:   Lumbar BRII today  Brace for comfort  Continue Robaxin  Continue to mobilize    I discussed the patient's findings and my recommendations with patient and Dr. Guerrero.       LOS: 1 day       Hanna King PA-C  6/3/2022  12:27 EDT    \"Dictated utilizing Dragon dictation\".      "

## 2022-06-04 ENCOUNTER — READMISSION MANAGEMENT (OUTPATIENT)
Dept: CALL CENTER | Facility: HOSPITAL | Age: 77
End: 2022-06-04

## 2022-06-04 ENCOUNTER — DOCUMENTATION (OUTPATIENT)
Dept: NEUROSURGERY | Facility: CLINIC | Age: 77
End: 2022-06-04

## 2022-06-04 VITALS
DIASTOLIC BLOOD PRESSURE: 86 MMHG | TEMPERATURE: 97.8 F | HEART RATE: 93 BPM | WEIGHT: 192.46 LBS | OXYGEN SATURATION: 99 % | HEIGHT: 70 IN | RESPIRATION RATE: 16 BRPM | SYSTOLIC BLOOD PRESSURE: 158 MMHG | BODY MASS INDEX: 27.55 KG/M2

## 2022-06-04 LAB
ANION GAP SERPL CALCULATED.3IONS-SCNC: 14.2 MMOL/L (ref 5–15)
BASOPHILS # BLD AUTO: 0.03 10*3/MM3 (ref 0–0.2)
BASOPHILS NFR BLD AUTO: 0.3 % (ref 0–1.5)
BUN SERPL-MCNC: 15 MG/DL (ref 8–23)
BUN/CREAT SERPL: 16.3 (ref 7–25)
CALCIUM SPEC-SCNC: 8.8 MG/DL (ref 8.6–10.5)
CHLORIDE SERPL-SCNC: 94 MMOL/L (ref 98–107)
CO2 SERPL-SCNC: 20.8 MMOL/L (ref 22–29)
CREAT SERPL-MCNC: 0.92 MG/DL (ref 0.76–1.27)
DEPRECATED RDW RBC AUTO: 45.2 FL (ref 37–54)
EGFRCR SERPLBLD CKD-EPI 2021: 86.2 ML/MIN/1.73
EOSINOPHIL # BLD AUTO: 0.03 10*3/MM3 (ref 0–0.4)
EOSINOPHIL NFR BLD AUTO: 0.3 % (ref 0.3–6.2)
ERYTHROCYTE [DISTWIDTH] IN BLOOD BY AUTOMATED COUNT: 13.6 % (ref 12.3–15.4)
GLUCOSE SERPL-MCNC: 185 MG/DL (ref 65–99)
HCT VFR BLD AUTO: 40.6 % (ref 37.5–51)
HGB BLD-MCNC: 14.1 G/DL (ref 13–17.7)
IMM GRANULOCYTES # BLD AUTO: 0.18 10*3/MM3 (ref 0–0.05)
IMM GRANULOCYTES NFR BLD AUTO: 1.8 % (ref 0–0.5)
LYMPHOCYTES # BLD AUTO: 0.87 10*3/MM3 (ref 0.7–3.1)
LYMPHOCYTES NFR BLD AUTO: 8.8 % (ref 19.6–45.3)
MCH RBC QN AUTO: 31.5 PG (ref 26.6–33)
MCHC RBC AUTO-ENTMCNC: 34.7 G/DL (ref 31.5–35.7)
MCV RBC AUTO: 90.8 FL (ref 79–97)
MONOCYTES # BLD AUTO: 0.42 10*3/MM3 (ref 0.1–0.9)
MONOCYTES NFR BLD AUTO: 4.2 % (ref 5–12)
NEUTROPHILS NFR BLD AUTO: 8.36 10*3/MM3 (ref 1.7–7)
NEUTROPHILS NFR BLD AUTO: 84.6 % (ref 42.7–76)
NRBC BLD AUTO-RTO: 0 /100 WBC (ref 0–0.2)
PLATELET # BLD AUTO: 293 10*3/MM3 (ref 140–450)
PMV BLD AUTO: 9.7 FL (ref 6–12)
POTASSIUM SERPL-SCNC: 4 MMOL/L (ref 3.5–5.2)
RBC # BLD AUTO: 4.47 10*6/MM3 (ref 4.14–5.8)
SODIUM SERPL-SCNC: 129 MMOL/L (ref 136–145)
WBC NRBC COR # BLD: 9.89 10*3/MM3 (ref 3.4–10.8)

## 2022-06-04 PROCEDURE — 25010000002 ONDANSETRON PER 1 MG: Performed by: INTERNAL MEDICINE

## 2022-06-04 PROCEDURE — 80048 BASIC METABOLIC PNL TOTAL CA: CPT | Performed by: INTERNAL MEDICINE

## 2022-06-04 PROCEDURE — 85025 COMPLETE CBC W/AUTO DIFF WBC: CPT | Performed by: INTERNAL MEDICINE

## 2022-06-04 PROCEDURE — 99231 SBSQ HOSP IP/OBS SF/LOW 25: CPT | Performed by: PHYSICIAN ASSISTANT

## 2022-06-04 RX ORDER — LIDOCAINE 50 MG/G
1 PATCH TOPICAL
Qty: 30 EACH | Refills: 0 | Status: SHIPPED | OUTPATIENT
Start: 2022-06-04 | End: 2022-07-11 | Stop reason: SDUPTHER

## 2022-06-04 RX ORDER — HYDROCODONE BITARTRATE AND ACETAMINOPHEN 10; 325 MG/1; MG/1
1 TABLET ORAL EVERY 4 HOURS PRN
Qty: 18 TABLET | Refills: 0 | Status: SHIPPED | OUTPATIENT
Start: 2022-06-04 | End: 2022-06-07

## 2022-06-04 RX ADMIN — POLYETHYLENE GLYCOL 3350 17 G: 17 POWDER, FOR SOLUTION ORAL at 03:25

## 2022-06-04 RX ADMIN — AMLODIPINE BESYLATE 5 MG: 5 TABLET ORAL at 11:12

## 2022-06-04 RX ADMIN — CETIRIZINE HYDROCHLORIDE 10 MG: 10 TABLET ORAL at 11:13

## 2022-06-04 RX ADMIN — HYDROCODONE BITARTRATE AND ACETAMINOPHEN 1 TABLET: 10; 325 TABLET ORAL at 03:04

## 2022-06-04 RX ADMIN — METHOCARBAMOL TABLETS 1000 MG: 750 TABLET, COATED ORAL at 06:43

## 2022-06-04 RX ADMIN — ONDANSETRON 4 MG: 2 INJECTION INTRAMUSCULAR; INTRAVENOUS at 10:11

## 2022-06-04 RX ADMIN — GABAPENTIN 400 MG: 400 CAPSULE ORAL at 10:16

## 2022-06-04 RX ADMIN — HYDROCODONE BITARTRATE AND ACETAMINOPHEN 1 TABLET: 10; 325 TABLET ORAL at 11:18

## 2022-06-04 RX ADMIN — LOSARTAN POTASSIUM 100 MG: 100 TABLET, FILM COATED ORAL at 11:12

## 2022-06-04 RX ADMIN — LIDOCAINE 1 PATCH: 50 PATCH TOPICAL at 11:13

## 2022-06-04 RX ADMIN — LEVOTHYROXINE SODIUM 25 MCG: 0.03 TABLET ORAL at 06:44

## 2022-06-04 RX ADMIN — HYDROCODONE BITARTRATE AND ACETAMINOPHEN 1 TABLET: 10; 325 TABLET ORAL at 06:44

## 2022-06-04 NOTE — PLAN OF CARE
Goal Outcome Evaluation:  Plan of Care Reviewed With: patient        Progress: improving  Outcome Evaluation: Pt remains stable. Back brace worn at all times. Pains has improved, Norci given Q4hrs. Miralax given for constipation. Voiding without difficulty. Educated on htn management. Possible dc home today. WCTM.

## 2022-06-04 NOTE — PROGRESS NOTES
[facility] NEUROSURGERY PROGRESS NOTE    HD #7: intractable back pain  POD #3: s/p left L4-5 facet injection  POD #1: s/p left L4-5 BRII    Interval History: Patient stable.  No acute issues overnight.  States that his intractable back pain was improved following the evening of his facet injection.  Had an epidural steroid injection yesterday for left lower extremity radiculopathy pain.  He states that this pain is much improved today.  He states he is getting in and out of bed much better.  Is ready for discharge home.    Vital signs in last 24 hours:  Temp:  [97.8 °F (36.6 °C)-98.4 °F (36.9 °C)] 97.8 °F (36.6 °C)  Heart Rate:  [65-94] 93  Resp:  [12-16] 16  BP: (141-165)/(77-98) 158/86    Intake/Output this shift:  No intake/output data recorded.    LABS:  Recent Results (from the past 24 hour(s))   Basic Metabolic Panel    Collection Time: 06/04/22  4:52 AM    Specimen: Blood   Result Value Ref Range    Glucose 185 (H) 65 - 99 mg/dL    BUN 15 8 - 23 mg/dL    Creatinine 0.92 0.76 - 1.27 mg/dL    Sodium 129 (L) 136 - 145 mmol/L    Potassium 4.0 3.5 - 5.2 mmol/L    Chloride 94 (L) 98 - 107 mmol/L    CO2 20.8 (L) 22.0 - 29.0 mmol/L    Calcium 8.8 8.6 - 10.5 mg/dL    BUN/Creatinine Ratio 16.3 7.0 - 25.0    Anion Gap 14.2 5.0 - 15.0 mmol/L    eGFR 86.2 >60.0 mL/min/1.73   CBC Auto Differential    Collection Time: 06/04/22  4:52 AM    Specimen: Blood   Result Value Ref Range    WBC 9.89 3.40 - 10.80 10*3/mm3    RBC 4.47 4.14 - 5.80 10*6/mm3    Hemoglobin 14.1 13.0 - 17.7 g/dL    Hematocrit 40.6 37.5 - 51.0 %    MCV 90.8 79.0 - 97.0 fL    MCH 31.5 26.6 - 33.0 pg    MCHC 34.7 31.5 - 35.7 g/dL    RDW 13.6 12.3 - 15.4 %    RDW-SD 45.2 37.0 - 54.0 fl    MPV 9.7 6.0 - 12.0 fL    Platelets 293 140 - 450 10*3/mm3    Neutrophil % 84.6 (H) 42.7 - 76.0 %    Lymphocyte % 8.8 (L) 19.6 - 45.3 %    Monocyte % 4.2 (L) 5.0 - 12.0 %    Eosinophil % 0.3 0.3 - 6.2 %    Basophil % 0.3 0.0 - 1.5 %    Immature Grans % 1.8 (H) 0.0 - 0.5 %     Neutrophils, Absolute 8.36 (H) 1.70 - 7.00 10*3/mm3    Lymphocytes, Absolute 0.87 0.70 - 3.10 10*3/mm3    Monocytes, Absolute 0.42 0.10 - 0.90 10*3/mm3    Eosinophils, Absolute 0.03 0.00 - 0.40 10*3/mm3    Basophils, Absolute 0.03 0.00 - 0.20 10*3/mm3    Immature Grans, Absolute 0.18 (H) 0.00 - 0.05 10*3/mm3    nRBC 0.0 0.0 - 0.2 /100 WBC       IMAGING STUDIES:  MRI Lumbar Spine Without Contrast    Result Date: 6/2/2022  1. Mild multilevel lumbar degenerative disc disease including an element of minimal canal stenosis. This disc disease is most prominent in the posterior central position at the L5-S1 level. 2. Findings appear similar to the previous study of 05/01/2022.   This report was finalized on 6/2/2022 8:56 PM by Dr. Kimani Ewing M.D.      PHYSICAL EXAM:  Mental Status: The patient is awake, alert and oriented x 3. Recent and remote memory functions are normal. The patient is attentive with normal concentration.  Language is fluent. Speech is clear. The speech is non-dysarthric. Fund of knowledge is normal.  Cranial Nerve I: Not tested.  Cranial Nerve II: The pupils are 3 mm, equally round and reactive to light.   Cranial Nerves III, IV, VI: The extraocular movements are full in all directions of gaze without nystagmus.  Cranial Nerve VII: Facial movements are symmetric  Motor: Tone is normal in all four extremities without fasciculations, atrophy, or myoclonus. There are no involuntary movements.   Muscle Strength: 5/5 muscle strength in bilateral lower extremities.     ASSESSMENT:    Intractable low back pain    Benign essential HTN    Acid reflux    DDD (degenerative disc disease), lumbar    Acquired hypothyroidism    Opioid dependence (HCC)    The patient is a 76-year-old male admitted for intractable low back pain, who has responded very well to facet and lumbar epidural steroid injections.    PLAN:   -cleared for discharge home from a neurosurgical standpoint  -will plan to follow-up in the office  "with Dr. Diaz    I discussed the patient's findings and my recommendations with patient and CROW Raya  6/4/2022  12:13 EDT    \"Dictated utilizing Dragon dictation\".      "

## 2022-06-04 NOTE — DISCHARGE SUMMARY
Patient Name: Kali Crespo  : 1945  MRN: [id number]    Date of Admission: 2022  Date of Discharge:  2022  Primary Care Physician: Nathan Arango MD      Chief Complaint:   Back Pain, Hip Pain (L), and Extremity Pain (LLE, RLE AT TIMES)      Discharge Diagnoses     Active Hospital Problems    Diagnosis  POA   • **Intractable low back pain [M54.59]  Yes   • Opioid dependence (HCC) [F11.20]  Yes   • Acquired hypothyroidism [E03.9]  Yes   • DDD (degenerative disc disease), lumbar [M51.36]  Yes   • Benign essential HTN [I10]  Yes   • Acid reflux [K21.9]  Yes      Resolved Hospital Problems   No resolved problems to display.        Hospital Course       This is a 76-year-old man with a history of lower back pain who came to the hospital with intractable low back pain.  He follows with pain management for radiofrequency ablation of bilateral facet joints, which had previously been working very well.  He reports that the pain intermittently radiates into the left lower extremity.  He is scheduled for a lumbar epidural steroid injection in the near future.  He denies any red flag symptoms including bladder or bladder dysfunction, or saddle anesthesia.  He underwent an MRI of the lumbar spine that showed partial embolization of S1, no evidence of disc herniation, as well as mild to moderate foraminal stenosis most prominent at L5-S1.  He was ordered a TLSO brace and pain was managed. He received a lumbar epidural spinal injection and 6/3 and will be discharged on [place] 10s     Day of Discharge     Physical Exam:  Temp:  [97.6 °F (36.4 °C)-98.4 °F (36.9 °C)] 97.8 °F (36.6 °C)  Heart Rate:  [65-94] 93  Resp:  [12-16] 16  BP: (141-165)/(75-98) 158/86  Body mass index is 27.62 kg/m².  Physical Exam  General: Alert and oriented x3, no acute distress.  HEENT: Normocephalic, atraumatic  Eyes: PERRL, EOMI, anicteric sclera  Lungs: Clear to auscultation bilaterally, no crackles or wheezes  CV: Regular rate  and rhythm, no murmurs rubs or gallops  Abdomen: Soft, nontender, nondistended.  Normoactive bowel sounds  Extremities: No significant peripheral edema  Skin: Clean/dry/intact, no rashes  Neuro: Cranial nerves II through XII intact, no gross focal neurological deficits appreciated  Psych: Appropriate mood and affect    Consultants     Consult Orders (all) (From admission, onward)     Start     Ordered    06/03/22 1214  [facility]patient [facility] Consult  Once        Specialty:  Pain Medicine  Provider:  (Not yet assigned)    06/03/22 1214    05/30/22 0740  Inpatient [facility] Consult  Once        Specialty:  Pain Medicine  Provider:  (Not yet assigned)    05/30/22 0739    05/29/22 2048  Inpatient Neurosurgery Consult  Once        Specialty:  Neurosurgery  Provider:  Wolf Diaz MD    05/29/22 2048 05/29/22 1736  [facility] (on-call MD unless specified) Details  Once        Specialty:  Hospitalist  Provider:  Stephanie Candelario MD    05/29/22 1735              Procedures     Imaging Results (All)     Procedure Component Value Units Date/Time    [place] Guided Pain Management Spine [[id number]] Resulted: 06/03/22 1353     Updated: 06/03/22 1353    Narrative:      This procedure was auto-finalized with no dictation required.    MRI Lumbar Spine Without Contrast [[id number]] Collected: 06/02/22 2028     Updated: 06/02/22 2059    Narrative:      MRI LUMBAR SPINE WO CONTRAST-  06/02/2022     HISTORY: Back pain.     TECHNIQUE: Multiple noncontrast sagittal and axial images were obtained  through the lumbar spine.     FINDINGS:      There is transitional anatomy at the lumbosacral junction. For purposes  of this dictation the lowermost liquefied disc is labeled as S1-S2.  Previous MRI of the lumbar spine dated 05/01/2022 used this same  labeling scheme.     At L1-2 there is a mild broad-based disc bulge with some mild flattening  of the anterior thecal sac. Mild facet joint  arthropathy is seen with  minimal canal stenosis.     At L2-3 there is a minimal broad-based disc bulge with no significant  canal stenosis.     At L3-4 there is minimal broad-based disc bulge with no significant  canal stenosis.     At L4-5 there is a mild broad-based disc bulge with mild foraminal  narrowing and facet joint arthropathy. Minimal concentric canal stenosis  is seen.     At L5-S1 there is a moderate size posterior central disc protrusion with  some flattening of the anterior thecal sac. There is mild foraminal  narrowing, facet joint arthropathy and mild canal stenosis.     These findings appear largely unchanged from the 05/01/2022 study.     Lower cord and conus appear normal. Signal intensity of the bony  structures is within normal limits.       Impression:      1. Mild multilevel lumbar degenerative disc disease including an element  of minimal canal stenosis. This disc disease is most prominent in the  posterior central position at the L5-S1 level.  2. Findings appear similar to the previous study of 05/01/2022.        This report was finalized on 6/2/2022 8:56 PM by Dr. Kimani Ewing M.D.       [place] Guided Pain Management Spine [[id number]] Resulted: 06/01/22 0915     Updated: 06/01/22 0915    Narrative:      This procedure was auto-finalized with no dictation required.    CT Abdomen Pelvis With Contrast [[id number]] Collected: 05/30/22 2055     Updated: 05/30/22 2107    Narrative:      CT OF THE ABDOMEN AND PELVIS WITH CONTRAST     HISTORY: Left groin adenopathy     COMPARISON: None available.     TECHNIQUE: Axial CT imaging was obtained through the abdomen and pelvis.  IV contrast was administered.     FINDINGS:  Images through the lung bases do not demonstrate any acute  abnormalities. 5 mm noncalcified pulmonary nodule seen within the right  lower lobe. Follow-up CT in 12 months could be considered. The stomach,  duodenum, adrenal glands, and gallbladder are normal. Pancreas is  atrophic.  A few punctate hyperdensities are noted within the spleen. No  suspicious hepatic lesions are seen. There is no hydronephrosis. The  kidneys enhance symmetrically. No distal ureteral or bladder stones are  seen. There are changes of prior prostatectomy. The urinary bladder is  decompressed. There is mild colonic diverticulosis. There is no bowel  obstruction. There are changes of prior pelvic lymph node dissection.  Inguinal lymph nodes do not appear pathologically enlarged. There is no  pelvic or retroperitoneal adenopathy. There is some calcification of the  aorta. Please see the report of the lumbar spine from 05/03/2022 for  full description of degenerative findings within the spine. Small area  of fat necrosis is suspected within the right hemipelvis, measuring  approximately 2.3 cm.       Impression:      No acute findings.     Radiation dose reduction techniques were utilized, including automated  exposure control and exposure modulation based on body size.     This report was finalized on 5/30/2022 9:04 PM by Dr. Shelby Dior M.D.             Pertinent Labs     Results from last 7 days   Lab Units 06/04/22  0452 06/03/22  0503 06/02/22 0519 05/30/22  0559   WBC 10*3/mm3 9.89 7.50 7.91 8.22   HEMOGLOBIN g/dL 14.1 13.9 13.8 14.0   PLATELETS 10*3/mm3 293 235 251 240     Results from last 7 days   Lab Units 06/04/22  0452 06/03/22  0503 06/02/22 0519 05/30/22  0559   SODIUM mmol/L 129* 137 138 139   POTASSIUM mmol/L 4.0 4.1 4.1 4.0   CHLORIDE mmol/L 94* 105 102 106   CO2 mmol/L 20.8* 23.6 22.8 23.0   BUN mg/dL 15 14 12 10   CREATININE mg/dL 0.92 0.81 0.81 0.82   GLUCOSE mg/dL 185* 94 101* 132*   Estimated Creatinine Clearance: 84.3 mL/min (by C-G formula based on SCr of 0.92 mg/dL).  Results from last 7 days   Lab Units 05/29/22  1747   ALBUMIN g/dL 3.80   BILIRUBIN mg/dL 0.4   ALK PHOS U/L 86   AST (SGOT) U/L 18   ALT (SGPT) U/L 24     Results from last 7 days   Lab Units 06/04/22  0452 06/03/22  0500  06/02/22  0519 05/30/22  0559 05/29/22  1747   CALCIUM mg/dL 8.8 8.7 9.2 9.3 9.2   ALBUMIN g/dL  --   --   --   --  3.80               Invalid input(s): LDLCALC        Test Results Pending at Discharge       Discharge Details        Discharge Medications      New Medications      Instructions Start Date   HYDROcodone-acetaminophen  MG per tablet  Commonly known as: NORCO  Replaces: HYDROcodone-acetaminophen 7.5-325 MG per tablet   1 tablet, Oral, Every 4 Hours PRN      lidocaine 5 %  Commonly known as: LIDODERM   1 patch, Transdermal, Every 24 Hours Scheduled, Remove & Discard patch within 12 hours or as directed by MD         Continue These Medications      Instructions Start Date   amLODIPine 5 MG tablet  Commonly known as: NORVASC   5 mg, Oral, Daily      diazePAM 2 MG tablet  Commonly known as: VALIUM   2 mg, Oral, 2 Times Daily PRN      fish oil 1000 MG capsule capsule   1,000 mg, Oral, Daily, HOLD PER MD INSTR      fluticasone 50 MCG/ACT nasal spray  Commonly known as: FLONASE   2 sprays, Nasal, Daily      gabapentin 100 MG capsule  Commonly known as: NEURONTIN   400 mg, Oral, 3 Times Daily      irbesartan 300 MG tablet  Commonly known as: AVAPRO   300 mg, Oral, Daily      levothyroxine 25 MCG tablet  Commonly known as: SYNTHROID, LEVOTHROID   25 mcg, Oral, Daily      loratadine 10 MG tablet  Commonly known as: CLARITIN   10 mg, Oral, Daily      multivitamin with minerals tablet tablet   1 tablet, Oral, Daily, HOLD PER MD INSTR      pantoprazole 40 MG EC tablet  Commonly known as: PROTONIX   40 mg, Oral, Daily      sildenafil 20 MG tablet  Commonly known as: REVATIO   Take 2-3 tabs QD prn      simvastatin 20 MG tablet  Commonly known as: ZOCOR   TAKE 1 TABLET BY MOUTH IN  THE EVENING      thiamine 100 MG tablet  Commonly known as: VITAMIN B-1   500 mg, Oral, Daily, HOLD PER MD INSTR         Stop These Medications    HYDROcodone-acetaminophen 7.5-325 MG per tablet  Commonly known as: NORCO  Replaced by:  HYDROcodone-acetaminophen  MG per tablet            Allergies   Allergen Reactions   • Adhesive Tape Hives     ekg dots makes very red spots on skin, raised spots.   • Amoxicillin Unknown - Low Severity   • Astemizole Unknown - Low Severity   • Codeine Sulfate    • Penicillins    • Phenylephrine Unknown - Low Severity   • Pyrilamine Unknown - Low Severity   • Sulfa Antibiotics Nausea And Vomiting         Discharge Disposition:  Home or Self Care    Discharge Diet:  Diet Order   Procedures   • Diet Regular; Cardiac       Discharge Activity:       CODE STATUS:    Code Status and Medical Interventions:   Ordered at: 05/29/22 1753     Code Status (Patient has no pulse and is not breathing):    CPR (Attempt to Resuscitate)     Medical Interventions (Patient has pulse or is breathing):    Full Support       Future Appointments   Date Time Provider Department Center   11/16/2022 10:30 AM Nathan Arango MD [id number]      Follow-up Information     Nathan Arango MD .    Specialty: Family Medicine  Contact information:  [id number] [id number]  [phone]                         Time Spent on Discharge:  Greater than 30 minutes      Marv Galvez MD  [facility]  06/04/22  06:51 EDT

## 2022-06-04 NOTE — PLAN OF CARE
Goal Outcome Evaluation:  VSS- Pt alert and oriented. Pain more under control since epidural and pain meds. Pt to follow up outpatient with Ortho. Pt discharged to home with family.

## 2022-06-04 NOTE — OUTREACH NOTE
Prep Survey    Flowsheet Row Responses   [facility] patient discharged from? [place]   Is LACE score < 7 ? No   Emergency Room discharge w/ pulse ox? No   Eligibility [facility]   Date of Admission 05/29/22   Date of Discharge 06/04/22   Discharge Disposition Home or Self Care   Discharge diagnosis Back pain, Hip Pain w/ Extremity Pain   Does the patient have one of the following disease processes/diagnoses(primary or secondary)? Other   Does the patient have Home health ordered? No   Is there a DME ordered? No   Prep survey completed? Yes          OSMIN MARTINEZ - Registered Nurse

## 2022-06-04 NOTE — PLAN OF CARE
Goal Outcome Evaluation:  VSS- Pt alert and oriented. Pt received epidural at pain clinic. Per patient pain is lessened due to the back brace. Pt eating, drinking and voiding well. Pt ambulating in room ad derek.

## 2022-06-06 ENCOUNTER — TELEPHONE (OUTPATIENT)
Dept: NEUROSURGERY | Facility: CLINIC | Age: 77
End: 2022-06-06

## 2022-06-06 ENCOUNTER — TRANSITIONAL CARE MANAGEMENT TELEPHONE ENCOUNTER (OUTPATIENT)
Dept: CALL CENTER | Facility: HOSPITAL | Age: 77
End: 2022-06-06

## 2022-06-06 RX ORDER — EPINEPHRINE 0.3 MG/.3ML
INJECTION SUBCUTANEOUS
COMMUNITY
Start: 2022-05-11

## 2022-06-06 NOTE — TELEPHONE ENCOUNTER
----- Message from CROW Marshall sent at 6/4/2022  8:24 AM EDT -----  Regarding: hospital f/u appointment  Can you please make this patient a hospital f/u appointment with Dr. Diaz?  No urgency, but if it is going to be months then he can see an APRN.    Thanks,  Alex

## 2022-06-06 NOTE — PROGRESS NOTES
Transitional Care Follow Up Visit  Subjective     Kali Crespo is a 76 y.o. male who presents for a transitional care management visit.    Within 48 business hours after discharge our office contacted him via telephone to coordinate his care and needs.      I reviewed and discussed the details of that call along with the discharge summary, hospital problems, inpatient lab results, inpatient diagnostic studies, and consultation reports with Kali.     Current outpatient and discharge medications have been reconciled for the patient.  Reviewed by: Nathan Arango MD      Date of TCM Phone Call 6/4/2022   [facility]   Date of Admission 5/29/2022   Date of Discharge 6/4/2022   Discharge Disposition Home or Self Care     Risk for Readmission (LACE) Score: 14 (6/4/2022  6:01 AM)      History of Present Illness   Course During Hospital Stay:      Date of Admission: 5/29/2022  Date of Discharge:  6/4/2022  Primary Care Physician: Nathan Arango MD        Chief Complaint:   Back Pain, Hip Pain (L), and Extremity Pain (LLE, RLE AT TIMES)        Discharge Diagnoses            Active Hospital Problems     Diagnosis   POA   • **Intractable low back pain [M54.59]   Yes   • Opioid dependence (HCC) [F11.20]   Yes   • Acquired hypothyroidism [E03.9]   Yes   • DDD (degenerative disc disease), lumbar [M51.36]   Yes   • Benign essential HTN [I10]   Yes   • Acid reflux [K21.9]   Yes       Resolved Hospital Problems   No resolved problems to display.         Hospital Course         This is a 76-year-old man with a history of lower back pain who came to the hospital with intractable low back pain.  He follows with pain management for radiofrequency ablation of bilateral facet joints, which had previously been working very well.  He reports that the pain intermittently radiates into the left lower extremity.  He is scheduled for a lumbar epidural steroid injection in the near future.  He denies any red flag  symptoms including bladder or bladder dysfunction, or saddle anesthesia.  He underwent an MRI of the lumbar spine that showed partial embolization of S1, no evidence of disc herniation, as well as mild to moderate foraminal stenosis most prominent at L5-S1.  He was ordered a TLSO brace and pain was managed. He received a lumbar epidural spinal injection and 6/3 and will be discharged on [place] 10s       Current outpatient and discharge medications have been reconciled for the patient.  Reviewed by: Nathan Arango MD     Pt reports marked improvement.    The following portions of the patient's history were reviewed and updated as appropriate: allergies, current medications, past family history, past medical history, past social history, past surgical history and problem list.    Review of Systems   Constitutional: Negative for activity change, chills and fever.   Respiratory: Negative for cough.    Cardiovascular: Negative for chest pain.   Psychiatric/Behavioral: Negative for dysphoric mood.       There were no vitals taken for this visit.    Objective   Physical Exam  Constitutional:       General: He is not in acute distress.     Appearance: He is well-developed.   Cardiovascular:      Rate and Rhythm: Normal rate and regular rhythm.   Pulmonary:      Effort: Pulmonary effort is normal.      Breath sounds: Normal breath sounds.   Neurological:      Mental Status: He is alert and oriented to person, place, and time.   Psychiatric:         Behavior: Behavior normal.         Thought Content: Thought content normal.     Hospital records reviewed with pt confirming HPI.      Assessment & Plan   Diagnoses and all orders for this visit:    1. Intractable low back pain (Primary)    2. Hospital discharge follow-up    Pt to continue all current medications and keep his Pain Management appt f/u's.

## 2022-06-06 NOTE — OUTREACH NOTE
Call Center TCM Note    Flowsheet Row Responses   [facility] patient discharged from? [place]   Does the patient have one of the following disease processes/diagnoses(primary or secondary)? Other   TCM attempt successful? Yes   Call start time 0854   Call end time 0902   Discharge diagnosis Back pain, Hip Pain w/ Extremity Pain   Meds reviewed with patient/caregiver? Yes   Is the patient having any side effects they believe may be caused by any medication additions or changes? No   Does the patient have all medications ordered at discharge? Yes   Is the patient taking all medications as directed (includes completed medication regime)? Yes   Does the patient have a primary care provider?  Yes   Comments regarding PCP PCP APPOINTMENT SCHEDULED DURING THIS CALL FOR 6/7/22   Has the patient kept scheduled appointments due by today? Yes   Has home health visited the patient within 72 hours of discharge? N/A   Psychosocial issues? No   Did the patient receive a copy of their discharge instructions? Yes   Nursing interventions Reviewed instructions with patient   What is the patient's perception of their health status since discharge? Improving   Is the patient/caregiver able to teach back signs and symptoms related to disease process for when to call PCP? Yes   Is the patient/caregiver able to teach back signs and symptoms related to disease process for when to call 911? Yes   Is the patient/caregiver able to teach back the hierarchy of who to call/visit for symptoms/problems? PCP, Specialist, Home health nurse, Urgent Care, ED, 911 Yes   If the patient is a current smoker, are they able to teach back resources for cessation? Not a smoker   TCM call completed? Yes          Mily Olivo LPN    6/6/2022, 09:03 EDT

## 2022-06-07 ENCOUNTER — OFFICE VISIT (OUTPATIENT)
Dept: FAMILY MEDICINE CLINIC | Facility: CLINIC | Age: 77
End: 2022-06-07

## 2022-06-07 VITALS
WEIGHT: 179 LBS | DIASTOLIC BLOOD PRESSURE: 81 MMHG | HEART RATE: 90 BPM | BODY MASS INDEX: 25.62 KG/M2 | SYSTOLIC BLOOD PRESSURE: 146 MMHG | HEIGHT: 70 IN | RESPIRATION RATE: 18 BRPM | TEMPERATURE: 97.7 F

## 2022-06-07 DIAGNOSIS — Z09 HOSPITAL DISCHARGE FOLLOW-UP: ICD-10-CM

## 2022-06-07 DIAGNOSIS — M54.59 INTRACTABLE LOW BACK PAIN: Primary | ICD-10-CM

## 2022-06-07 PROCEDURE — 99495 TRANSJ CARE MGMT MOD F2F 14D: CPT | Performed by: FAMILY MEDICINE

## 2022-06-08 NOTE — TELEPHONE ENCOUNTER
I called and spoke to the patient's wife regarding the patient's refill. I did advise that hydrocodone is prescribed for our surgical patients and she voiced [email or web address] They did reach out to [facility] for refill but they will not fill it at the 10 mg dose, only the 7.5 mg dose, the wife states that this dose did not control his pain. I have called and left a message for [facility] to get more information.

## 2022-06-15 ENCOUNTER — TELEPHONE (OUTPATIENT)
Dept: NEUROSURGERY | Facility: CLINIC | Age: 77
End: 2022-06-15

## 2022-06-15 NOTE — TELEPHONE ENCOUNTER
PATIENT IS CALLING WITH STABBING PAIN IN HIS LEFT LEG.  HE RECENTLY GOT INJECTION IN HIS LOWER BACK AND LEFT LEG BUT THE PAIN HAS COME BACK.  HE WOULD LIKE TO MAKE A APPT TO SEE IF HE CAN  CONTINUE TREATMENT FOR HIS LOWER BACK.  PLEASE CALL PATIENT AND ADVISE.  [facility]      [phone]

## 2022-07-06 NOTE — PROGRESS NOTES
"Subjective   History of Present Illness: Kali Crespo is a 76 y.o. male is here today for hospital follow-up. He presented to the ER on 05/29/2022 with intractable low back pain. MRI of the lumbar spine that showed partial embolization of S1. He was discharged home.     Today Mr. Crespo reports that he feels well overall. Patient states that he has no low back pain post facet injection. Patient has Intermittent B/L leg pain. States that he had an ablation with his Pain Management physician and then he states that he had back pain so bad that he could not eat for 2 weeks, or sleep.  He states that he has had several surgeries that had not hurt like the ablation.  He states that he has since been back to Pain Management and had other procedures.  He is feeling much better, has been wearing his warm and form brace. States that he is not having any pain, and would like to continue to see us for in case he has another flare up of back pain.  He states that he has scheduled epidural injections every 3 months with [facility].        The following portions of the patient's history were reviewed and updated as appropriate: allergies, current medications, past family history, past medical history, past social history, past surgical history and problem list.    Review of Systems   Constitutional: Negative for chills and fever.   Musculoskeletal: Positive for myalgias. Negative for back pain and gait problem.   Neurological: Negative for weakness and numbness.   All other systems reviewed and are negative.      Objective     Vitals:    07/11/22 1534   BP: 132/78   Cuff Size: Adult   Pulse: 89   Temp: 98 °F (36.7 °C)   SpO2: 99%   Weight: 81.2 kg (179 lb)   Height: 177.8 cm (70\")     Body mass index is 25.68 kg/m².      Physical Exam  Vitals and nursing note reviewed.   HENT:      Head: Normocephalic and atraumatic.   Eyes:      Conjunctiva/sclera: Conjunctivae normal.   Cardiovascular:      Rate and " Rhythm: Normal rate.      Pulses: Normal pulses.   Pulmonary:      Effort: Pulmonary effort is normal.   Musculoskeletal:         General: Normal range of motion.      Cervical back: Normal range of motion.      Right lower leg: No edema.      Left lower leg: No edema.   Skin:     General: Skin is warm and dry.   Neurological:      Mental Status: He is oriented to person, place, and time.      GCS: GCS eye subscore is 4. GCS verbal subscore is 5. GCS motor subscore is 6.      Deep Tendon Reflexes: Strength normal.   Psychiatric:         Attention and Perception: Attention normal.         Mood and Affect: Mood normal.         Speech: Speech normal.         Behavior: Behavior is cooperative.         Cognition and Memory: Cognition and memory normal.       Neurologic Exam     Mental Status   Oriented to person, place, and time.   Speech: speech is normal   Level of consciousness: alert    Motor Exam   Muscle bulk: normal    Strength   Strength 5/5 throughout.     Sensory Exam   Light touch normal.     Gait, Coordination, and Reflexes     Tremor   Resting tremor: absent  Intention tremor: absent  Action tremor: absent      Assessment & Plan   Independent Review of Radiographic Studies:      No new imaging    Medical Decision Making:    Chronic low back pain and he is currently patient with [facility] pain management.  He has scheduled epidural injections every 3 months, and would like to stay current with our office in case he has any needs in the future.  He is not having any pain at this time, in his low back, or his lower extremities.  He continues to have intermittent right low back pain, for which I have ordered him some Lidoderm patches and explained that he can get these from his pain management group, or his primary care physician, once he needs a refill.  In addition, I explained that he can use heat followed by ice for his low back, or analgesic creams.  He will continue to follow-up with [facility] pain  management and and I have scheduled a follow-up in 6 months with him.  If he is better in 6 months he can move his appointment out, and I have explained that he needs to call our office if he has any new pain, weakness, or numbness tingling and needs to see us sooner.  He is in agreement with this plan.    Diagnoses and all orders for this visit:    1. Chronic low back pain without sciatica, unspecified back pain laterality (Primary)    Other orders  -     lidocaine (LIDODERM) 5 %; Place 1 patch on the skin as directed by provider Daily. Remove & Discard patch within 12 hours or as directed by MD  Dispense: 30 each; Refill: 0      Return in about 6 months (around 1/11/2023) for follow up with [facility], after pain management.

## 2022-07-11 ENCOUNTER — OFFICE VISIT (OUTPATIENT)
Dept: NEUROSURGERY | Facility: CLINIC | Age: 77
End: 2022-07-11

## 2022-07-11 ENCOUNTER — TELEPHONE (OUTPATIENT)
Dept: NEUROSURGERY | Facility: CLINIC | Age: 77
End: 2022-07-11

## 2022-07-11 VITALS
BODY MASS INDEX: 25.62 KG/M2 | DIASTOLIC BLOOD PRESSURE: 78 MMHG | HEART RATE: 89 BPM | SYSTOLIC BLOOD PRESSURE: 132 MMHG | HEIGHT: 70 IN | TEMPERATURE: 98 F | OXYGEN SATURATION: 99 % | WEIGHT: 179 LBS

## 2022-07-11 DIAGNOSIS — M54.50 CHRONIC LOW BACK PAIN WITHOUT SCIATICA, UNSPECIFIED BACK PAIN LATERALITY: Primary | ICD-10-CM

## 2022-07-11 DIAGNOSIS — G89.29 CHRONIC LOW BACK PAIN WITHOUT SCIATICA, UNSPECIFIED BACK PAIN LATERALITY: Primary | ICD-10-CM

## 2022-07-11 PROCEDURE — 99213 OFFICE O/P EST LOW 20 MIN: CPT | Performed by: NURSE PRACTITIONER

## 2022-07-11 RX ORDER — LIDOCAINE 50 MG/G
1 PATCH TOPICAL
Qty: 30 EACH | Refills: 0 | Status: SHIPPED | OUTPATIENT
Start: 2022-07-11 | End: 2022-07-13

## 2022-07-11 NOTE — TELEPHONE ENCOUNTER
PT SAW JAUN IN OFFICE 7/11/22 AND PER JAUN Return in about 6 months (around 1/11/2023) for follow up with [facility], after pain management.

## 2022-07-12 DIAGNOSIS — E78.2 MIXED HYPERLIPIDEMIA: Chronic | ICD-10-CM

## 2022-07-12 DIAGNOSIS — K21.9 GASTROESOPHAGEAL REFLUX DISEASE WITHOUT ESOPHAGITIS: Chronic | ICD-10-CM

## 2022-07-12 DIAGNOSIS — I10 BENIGN ESSENTIAL HTN: ICD-10-CM

## 2022-07-12 DIAGNOSIS — E03.9 ACQUIRED HYPOTHYROIDISM: Chronic | ICD-10-CM

## 2022-07-12 RX ORDER — AMLODIPINE BESYLATE 5 MG/1
5 TABLET ORAL DAILY
Qty: 90 TABLET | Refills: 3 | OUTPATIENT
Start: 2022-07-12

## 2022-07-12 RX ORDER — PANTOPRAZOLE SODIUM 40 MG/1
40 TABLET, DELAYED RELEASE ORAL DAILY
Qty: 90 TABLET | Refills: 3 | OUTPATIENT
Start: 2022-07-12

## 2022-07-12 RX ORDER — SIMVASTATIN 20 MG
TABLET ORAL
Qty: 90 TABLET | Refills: 3 | OUTPATIENT
Start: 2022-07-12

## 2022-07-12 RX ORDER — IRBESARTAN 300 MG/1
300 TABLET ORAL DAILY
Qty: 90 TABLET | Refills: 3 | OUTPATIENT
Start: 2022-07-12

## 2022-07-12 RX ORDER — LEVOTHYROXINE SODIUM 0.03 MG/1
25 TABLET ORAL DAILY
Qty: 90 TABLET | Refills: 3 | OUTPATIENT
Start: 2022-07-12

## 2022-07-13 ENCOUNTER — OFFICE VISIT (OUTPATIENT)
Dept: FAMILY MEDICINE CLINIC | Facility: CLINIC | Age: 77
End: 2022-07-13

## 2022-07-13 VITALS
HEART RATE: 84 BPM | HEIGHT: 70 IN | RESPIRATION RATE: 16 BRPM | BODY MASS INDEX: 26.48 KG/M2 | WEIGHT: 185 LBS | SYSTOLIC BLOOD PRESSURE: 119 MMHG | DIASTOLIC BLOOD PRESSURE: 77 MMHG | TEMPERATURE: 98.2 F

## 2022-07-13 DIAGNOSIS — R11.0 NAUSEA: ICD-10-CM

## 2022-07-13 DIAGNOSIS — R35.0 URINARY FREQUENCY: Primary | ICD-10-CM

## 2022-07-13 PROCEDURE — 99213 OFFICE O/P EST LOW 20 MIN: CPT | Performed by: FAMILY MEDICINE

## 2022-07-13 RX ORDER — ONDANSETRON 4 MG/1
4 TABLET, FILM COATED ORAL EVERY 8 HOURS PRN
Qty: 20 TABLET | Refills: 5 | Status: SHIPPED | OUTPATIENT
Start: 2022-07-13 | End: 2022-11-16 | Stop reason: SDUPTHER

## 2022-07-13 RX ORDER — LIDOCAINE 50 MG/G
1 PATCH TOPICAL EVERY 24 HOURS
Qty: 90 PATCH | Refills: 0 | Status: SHIPPED | OUTPATIENT
Start: 2022-07-13 | End: 2022-11-30 | Stop reason: SINTOL

## 2022-07-13 NOTE — TELEPHONE ENCOUNTER
Rx Refill Note  Requested Prescriptions     Pending Prescriptions Disp Refills   • lidocaine (LIDODERM) 5 % [Pharmacy Med Name: LIDOCAINE 5% PATCH] 90 patch      Sig: PLACE ONE PATCH ON THE SKIN EVERY DAY AS DIRECTED BY PROVIDER. REMOVE AND DISCARD PATCH WITHIN 12 HOURS      Last office visit with prescribing clinician: 7/11/2022      Next office visit with prescribing clinician: Visit date not found            ERIN CALHOUN MA  07/13/22, 16:24 EDT

## 2022-07-13 NOTE — PROGRESS NOTES
"Subjective   Kali Crespo is a 76 y.o. male.     CC: Urinary Symptoms/Nausea    History of Present Illness     Pt comes in today after being up all last PM having to urinate. No burning or flank pain. Pt went this AM to the [facility] [place] in the building, where a urine was completely WNL (pt has had a prostatectomy prior). Pt later had a good BM that relieved all his sx. Pt has been on Miralax since being in the hospital earlier this year, but more recently his stools have been looser. Ancillary, he's like something for occasional nausea.      The following portions of the patient's history were reviewed and updated as appropriate: allergies, current medications, past family history, past medical history, past social history, past surgical history and problem list.    Review of Systems   Constitutional: Negative for activity change, chills and fever.   Respiratory: Negative for cough.    Cardiovascular: Negative for chest pain.   Psychiatric/Behavioral: Negative for dysphoric mood.       /77   Pulse 84   Temp 98.2 °F (36.8 °C) (Oral)   Resp 16   Ht 177.8 cm (70\")   Wt 83.9 kg (185 lb)   BMI 26.54 kg/m²     Objective   Physical Exam  Constitutional:       General: He is not in acute distress.     Appearance: He is well-developed.   Cardiovascular:      Rate and Rhythm: Normal rate and regular rhythm.   Pulmonary:      Effort: Pulmonary effort is normal.      Breath sounds: Normal breath sounds.   Abdominal:      General: Abdomen is flat. There is no distension.      Palpations: Abdomen is soft. There is no mass.      Tenderness: There is no abdominal tenderness. There is no guarding or rebound.      Hernia: No hernia is present.   Neurological:      Mental Status: He is alert and oriented to person, place, and time.   Psychiatric:         Behavior: Behavior normal.         Thought Content: Thought content normal.     [facility] notes independently reviewed by me at today's visit.    Assessment & Plan   Diagnoses " and all orders for this visit:    1. Urinary frequency (Primary)    2. Nausea  -     ondansetron (Zofran) 4 MG tablet; Take 1 tablet by mouth Every 8 (Eight) Hours As Needed for Nausea or Vomiting.  Dispense: 20 tablet; Refill: 5    Nature discussed. Pt seems to have maybe had a stool burden in the pelvic/sigmoid region that was causing the bladder sx, and these sx have since resolved since the BM. Miralax usage discussed and pt to play around with the best way to take that medication.

## 2022-07-14 DIAGNOSIS — J30.1 CHRONIC SEASONAL ALLERGIC RHINITIS DUE TO POLLEN: Chronic | ICD-10-CM

## 2022-07-14 RX ORDER — FLUTICASONE PROPIONATE 50 MCG
SPRAY, SUSPENSION (ML) NASAL
Qty: 48 G | Refills: 0 | Status: SHIPPED | OUTPATIENT
Start: 2022-07-14 | End: 2022-11-15 | Stop reason: SDUPTHER

## 2022-07-21 ENCOUNTER — TELEMEDICINE (OUTPATIENT)
Dept: FAMILY MEDICINE CLINIC | Facility: CLINIC | Age: 77
End: 2022-07-21

## 2022-07-21 ENCOUNTER — TELEPHONE (OUTPATIENT)
Dept: FAMILY MEDICINE CLINIC | Facility: CLINIC | Age: 77
End: 2022-07-21

## 2022-07-21 VITALS — BODY MASS INDEX: 26.48 KG/M2 | HEIGHT: 70 IN | WEIGHT: 185 LBS

## 2022-07-21 DIAGNOSIS — U07.1 COVID-19: Primary | ICD-10-CM

## 2022-07-21 DIAGNOSIS — R05.9 COUGH: ICD-10-CM

## 2022-07-21 PROCEDURE — 99213 OFFICE O/P EST LOW 20 MIN: CPT | Performed by: NURSE PRACTITIONER

## 2022-07-21 RX ORDER — DOXYCYCLINE HYCLATE 100 MG/1
100 CAPSULE ORAL 2 TIMES DAILY
Qty: 20 CAPSULE | Refills: 0 | Status: SHIPPED | OUTPATIENT
Start: 2022-07-21 | End: 2022-11-15

## 2022-07-21 NOTE — TELEPHONE ENCOUNTER
Caller: No Crespo    Relationship to patient: Emergency Contact    Best call back number: [phone]    Date of exposure: UNKNOWN    Date of positive COVID19 test: 07/20/2022    Date if possible COVID19 exposure: UNKNOWN    COVID19 symptoms: FEVER OF 99, RUNNY NOSE, CONGESTION    Date of initial quarantine: 07/20/2022    Additional information or concerns: PATIENTS SPOUSE (PATIENT STATES THE OK TO SPEAK WITH HER SINCE SHE IS NOT ON THE UPDATED  VERBAL FORM) IS WANTING TO KNOW IF THE PATIENT CAN BE PRESCRIBED ANY MEDICATION FOR COVID.     PLEASE CALL PATIENTS SPOUSE BACK.     What is the patients preferred pharmacy: [id number] - [place], [place] - [address] AT [facility] [address] &  - [phone] [facility] [phone] FX

## 2022-07-21 NOTE — PROGRESS NOTES
"Chief Complaint  Covid-19 Home Monitoring Video Visit (Covid positive 7-20-22), Nasal Congestion, Cough, and Fever    Subjective          You have chosen to receive care through a telehealth visit.  Do you consent to use a video/audio connection for your medical care today? Yes    Kali presents to [facility]  For a Video tele-health visit to discuss testing positive for Covid 19/SARS on 7/20/2022.  Symptom onset was 7/19/2022 including non productive cough, low grade fever  tmax 99.8 relieved by tylenol.  Nasal congestion and body aches.   He denies any sob or wheezing.  Denies any abdominal pain.  NO N/V/D.  He is not taking any other otc medications.  He is able to eat and keep down fluids.      The following portions of the patient's history were reviewed and updated as appropriate: allergies, current medications, past family history, past medical history, past social history, past surgical history, and problem list    Review of Systems   Constitutional: Negative for chills, fatigue and fever.   Eyes: Negative for visual disturbance.   Respiratory: Positive for cough. Negative for shortness of breath and wheezing.    Cardiovascular: Negative for chest pain, palpitations and leg swelling.   Neurological: Negative for dizziness and light-headedness.        Objective   Vital Signs: unable to assess on this visit  Vitals:    07/21/22 1019   Weight: 83.9 kg (185 lb)   Height: 177.8 cm (70\")      BMI 26.54  BMI is >= 25 and <30. (Overweight) The following options were offered after discussion;: weight loss educational material (shared in after visit summary)      Virtual  Physical Exam  Constitutional:       General: He is not in acute distress.     Appearance: Normal appearance.   Pulmonary:      Effort: Pulmonary effort is normal. No respiratory distress.   Neurological:      Mental Status: He is alert and oriented to person, place, and time.   Psychiatric:         Mood and Affect: " Mood normal.          Result Review :     The following data was reviewed by: MICHELA Betancourt on 07/21/2022:      Covid 19 positive on 7/20/2022  Symptom onset  7/19/2022     Assessment and Plan    Diagnoses and all orders for this visit:    1. COVID-19 (Primary)  Comments:   Covid 19 positive on 7/20/2022  Symptom onset  7/19/2022    2. Cough  Comments:  return to clinic if no improvements or worsening symptoms  Orders:  -     doxycycline (VIBRAMYCIN) 100 MG capsule; Take 1 capsule by mouth 2 (Two) Times a Day. For infection  Dispense: 20 capsule; Refill: 0      Plan:  -Take all medications as prescribed and until completed.  Hold Doxycycline for worsening cough-  To prevent secondary infection   Previous hx pneumonia  -Covid test was positive on 07/20/2022  -Monitor for fever and take Tylenol as needed.  Drink plenty of fluids and get plenty of rest.  -Use cool-mist humidifier as needed.  -Seek immediate medical attention for fever unrelieved by Tylenol, chest pain, shortness of breath, sharp back pain, or any other worsening signs or symptoms.  -Remain in quarantine until 10 days from symptom onset.  -The patient verbalized understanding of all instructions given today.     I would recommend an over-the-counter vitamin regimen to boost your immune system of the following: Vitamin D3 5,000 IU daily, vitamin C 500-1,000 mg twice daily, Quercetin 250 mg twice daily, Zinc 100 mg/day, and Melatonin 5-10 mg before bedtime.  You can purchase a pulse oximeter at any local pharmacy to monitor your oxygen saturations.  Call 911 if you have shortness of breath, sharp chest pain, sharp pain in your back, or a fever that will not come down by Tylenol    This was an audio and video enabled telemedicine encounter.-  Problems with connection cutting out-  Finished by telephone  Video visit lasted approx 12 minutes  Follow Up   Return if symptoms worsen or fail to improve.  Patient was given instructions and counseling  regarding his condition or for health maintenance advice. Please see specific information pulled into the AVS if appropriate.

## 2022-08-20 DIAGNOSIS — K21.9 GASTROESOPHAGEAL REFLUX DISEASE WITHOUT ESOPHAGITIS: Chronic | ICD-10-CM

## 2022-08-20 DIAGNOSIS — E78.2 MIXED HYPERLIPIDEMIA: Chronic | ICD-10-CM

## 2022-08-20 DIAGNOSIS — I10 BENIGN ESSENTIAL HTN: ICD-10-CM

## 2022-08-20 DIAGNOSIS — E03.9 ACQUIRED HYPOTHYROIDISM: Chronic | ICD-10-CM

## 2022-08-22 RX ORDER — AMLODIPINE BESYLATE 5 MG/1
5 TABLET ORAL DAILY
Qty: 30 TABLET | Refills: 0 | Status: SHIPPED | OUTPATIENT
Start: 2022-08-22 | End: 2022-10-04

## 2022-08-22 RX ORDER — IRBESARTAN 300 MG/1
300 TABLET ORAL DAILY
Qty: 30 TABLET | Refills: 0 | Status: SHIPPED | OUTPATIENT
Start: 2022-08-22 | End: 2022-10-04

## 2022-08-22 RX ORDER — SIMVASTATIN 20 MG
TABLET ORAL
Qty: 30 TABLET | Refills: 0 | Status: SHIPPED | OUTPATIENT
Start: 2022-08-22 | End: 2022-10-04

## 2022-08-22 RX ORDER — PANTOPRAZOLE SODIUM 40 MG/1
40 TABLET, DELAYED RELEASE ORAL DAILY
Qty: 30 TABLET | Refills: 0 | Status: SHIPPED | OUTPATIENT
Start: 2022-08-22 | End: 2022-10-04

## 2022-08-22 RX ORDER — LEVOTHYROXINE SODIUM 0.03 MG/1
25 TABLET ORAL DAILY
Qty: 30 TABLET | Refills: 0 | Status: SHIPPED | OUTPATIENT
Start: 2022-08-22 | End: 2022-10-04

## 2022-10-03 DIAGNOSIS — E03.9 ACQUIRED HYPOTHYROIDISM: Chronic | ICD-10-CM

## 2022-10-03 DIAGNOSIS — E78.2 MIXED HYPERLIPIDEMIA: Chronic | ICD-10-CM

## 2022-10-03 DIAGNOSIS — I10 BENIGN ESSENTIAL HTN: ICD-10-CM

## 2022-10-03 DIAGNOSIS — K21.9 GASTROESOPHAGEAL REFLUX DISEASE WITHOUT ESOPHAGITIS: Chronic | ICD-10-CM

## 2022-10-04 RX ORDER — SIMVASTATIN 20 MG
TABLET ORAL
Qty: 30 TABLET | Refills: 0 | Status: SHIPPED | OUTPATIENT
Start: 2022-10-04 | End: 2022-11-07

## 2022-10-04 RX ORDER — IRBESARTAN 300 MG/1
300 TABLET ORAL DAILY
Qty: 30 TABLET | Refills: 0 | Status: SHIPPED | OUTPATIENT
Start: 2022-10-04 | End: 2022-11-07

## 2022-10-04 RX ORDER — LEVOTHYROXINE SODIUM 0.03 MG/1
25 TABLET ORAL DAILY
Qty: 30 TABLET | Refills: 11 | Status: SHIPPED | OUTPATIENT
Start: 2022-10-04 | End: 2022-11-15 | Stop reason: SDUPTHER

## 2022-10-04 RX ORDER — AMLODIPINE BESYLATE 5 MG/1
5 TABLET ORAL DAILY
Qty: 30 TABLET | Refills: 0 | Status: SHIPPED | OUTPATIENT
Start: 2022-10-04 | End: 2022-11-07

## 2022-10-04 RX ORDER — PANTOPRAZOLE SODIUM 40 MG/1
40 TABLET, DELAYED RELEASE ORAL DAILY
Qty: 30 TABLET | Refills: 0 | Status: SHIPPED | OUTPATIENT
Start: 2022-10-04 | End: 2022-11-07

## 2022-11-06 DIAGNOSIS — E78.2 MIXED HYPERLIPIDEMIA: Chronic | ICD-10-CM

## 2022-11-06 DIAGNOSIS — I10 BENIGN ESSENTIAL HTN: ICD-10-CM

## 2022-11-06 DIAGNOSIS — K21.9 GASTROESOPHAGEAL REFLUX DISEASE WITHOUT ESOPHAGITIS: Chronic | ICD-10-CM

## 2022-11-07 RX ORDER — SIMVASTATIN 20 MG
TABLET ORAL
Qty: 30 TABLET | Refills: 0 | Status: SHIPPED | OUTPATIENT
Start: 2022-11-07 | End: 2022-11-15 | Stop reason: SDUPTHER

## 2022-11-07 RX ORDER — IRBESARTAN 300 MG/1
300 TABLET ORAL DAILY
Qty: 30 TABLET | Refills: 0 | Status: SHIPPED | OUTPATIENT
Start: 2022-11-07 | End: 2022-11-15 | Stop reason: SDUPTHER

## 2022-11-07 RX ORDER — PANTOPRAZOLE SODIUM 40 MG/1
40 TABLET, DELAYED RELEASE ORAL DAILY
Qty: 30 TABLET | Refills: 11 | Status: SHIPPED | OUTPATIENT
Start: 2022-11-07 | End: 2022-11-15 | Stop reason: SDUPTHER

## 2022-11-07 RX ORDER — AMLODIPINE BESYLATE 5 MG/1
5 TABLET ORAL DAILY
Qty: 30 TABLET | Refills: 0 | Status: SHIPPED | OUTPATIENT
Start: 2022-11-07 | End: 2022-11-15 | Stop reason: SDUPTHER

## 2022-11-08 DIAGNOSIS — I10 BENIGN ESSENTIAL HTN: Primary | ICD-10-CM

## 2022-11-08 DIAGNOSIS — E78.2 MIXED HYPERLIPIDEMIA: ICD-10-CM

## 2022-11-08 DIAGNOSIS — E03.9 ACQUIRED HYPOTHYROIDISM: ICD-10-CM

## 2022-11-08 DIAGNOSIS — Z12.5 PROSTATE CANCER SCREENING: ICD-10-CM

## 2022-11-10 LAB
ALBUMIN SERPL-MCNC: 3.9 G/DL (ref 3.5–5.2)
ALBUMIN/GLOB SERPL: 2.1 G/DL
ALP SERPL-CCNC: 98 U/L (ref 39–117)
ALT SERPL-CCNC: 15 U/L (ref 1–41)
AST SERPL-CCNC: 14 U/L (ref 1–40)
BASOPHILS # BLD AUTO: 0.03 10*3/MM3 (ref 0–0.2)
BASOPHILS NFR BLD AUTO: 0.8 % (ref 0–1.5)
BILIRUB SERPL-MCNC: 0.4 MG/DL (ref 0–1.2)
BUN SERPL-MCNC: 16 MG/DL (ref 8–23)
BUN/CREAT SERPL: 16.7 (ref 7–25)
CALCIUM SERPL-MCNC: 9.1 MG/DL (ref 8.6–10.5)
CHLORIDE SERPL-SCNC: 106 MMOL/L (ref 98–107)
CHOLEST SERPL-MCNC: 155 MG/DL (ref 0–200)
CO2 SERPL-SCNC: 29.9 MMOL/L (ref 22–29)
CREAT SERPL-MCNC: 0.96 MG/DL (ref 0.76–1.27)
EGFRCR SERPLBLD CKD-EPI 2021: 81.4 ML/MIN/1.73
EOSINOPHIL # BLD AUTO: 0.24 10*3/MM3 (ref 0–0.4)
EOSINOPHIL NFR BLD AUTO: 6.5 % (ref 0.3–6.2)
ERYTHROCYTE [DISTWIDTH] IN BLOOD BY AUTOMATED COUNT: 13.1 % (ref 12.3–15.4)
GLOBULIN SER CALC-MCNC: 1.9 GM/DL
GLUCOSE SERPL-MCNC: 88 MG/DL (ref 65–99)
HCT VFR BLD AUTO: 38 % (ref 37.5–51)
HDLC SERPL-MCNC: 39 MG/DL (ref 40–60)
HGB BLD-MCNC: 13.2 G/DL (ref 13–17.7)
IMM GRANULOCYTES # BLD AUTO: 0.02 10*3/MM3 (ref 0–0.05)
IMM GRANULOCYTES NFR BLD AUTO: 0.5 % (ref 0–0.5)
LDLC SERPL CALC-MCNC: 91 MG/DL (ref 0–100)
LYMPHOCYTES # BLD AUTO: 1.12 10*3/MM3 (ref 0.7–3.1)
LYMPHOCYTES NFR BLD AUTO: 30.2 % (ref 19.6–45.3)
MCH RBC QN AUTO: 30.8 PG (ref 26.6–33)
MCHC RBC AUTO-ENTMCNC: 34.7 G/DL (ref 31.5–35.7)
MCV RBC AUTO: 88.8 FL (ref 79–97)
MONOCYTES # BLD AUTO: 0.29 10*3/MM3 (ref 0.1–0.9)
MONOCYTES NFR BLD AUTO: 7.8 % (ref 5–12)
NEUTROPHILS # BLD AUTO: 2.01 10*3/MM3 (ref 1.7–7)
NEUTROPHILS NFR BLD AUTO: 54.2 % (ref 42.7–76)
NRBC BLD AUTO-RTO: 0 /100 WBC (ref 0–0.2)
PLATELET # BLD AUTO: 210 10*3/MM3 (ref 140–450)
POTASSIUM SERPL-SCNC: 4.4 MMOL/L (ref 3.5–5.2)
PROT SERPL-MCNC: 5.8 G/DL (ref 6–8.5)
PSA SERPL-MCNC: <0.014 NG/ML (ref 0–4)
RBC # BLD AUTO: 4.28 10*6/MM3 (ref 4.14–5.8)
SODIUM SERPL-SCNC: 143 MMOL/L (ref 136–145)
TRIGL SERPL-MCNC: 140 MG/DL (ref 0–150)
TSH SERPL DL<=0.005 MIU/L-ACNC: 3.01 UIU/ML (ref 0.27–4.2)
VLDLC SERPL CALC-MCNC: 25 MG/DL (ref 5–40)
WBC # BLD AUTO: 3.71 10*3/MM3 (ref 3.4–10.8)

## 2022-11-15 NOTE — PROGRESS NOTES
Subjective   Kali Crespo is a 77 y.o. male.     History of Present Illness     Chief Complaint:   Chief Complaint   Patient presents with   • Hypertension     MED REFILL / LAB RESULTS    • Hyperlipidemia     Colonoscopy due    • Hypothyroidism   • Heartburn   • Arthritis              Kali Crespo 77 y.o. male who presents today for Medical Management of the below listed issues. He  has a problem list of   Patient Active Problem List   Diagnosis   • Cervical pain   • Migraine without aura and without status migrainosus, not intractable   • Vertigo   • Benign essential HTN   • Acid reflux   • Encounter for pre-operative cardiovascular clearance   • Abnormal EKG   • Hyperlipidemia   • Erectile dysfunction   • Prostate cancer (HCC)   • Meniere disease, bilateral   • DDD (degenerative disc disease), lumbar   • Low back pain   • Malignant neoplastic disease (HCC)   • Alternating esotropia   • Diplopia   • Laceration of extensor muscle, fascia and tendon of right ring finger at wrist and hand level, initial encounter   • Acquired hypothyroidism   • Intractable low back pain   • Opioid dependence (HCC)   .  Since the last visit, He has overall felt well.  he has been compliant with   Current Outpatient Medications:   •  amLODIPine (NORVASC) 5 MG tablet, Take 1 tablet by mouth Daily., Disp: 90 tablet, Rfl: 1  •  diazePAM (VALIUM) 2 MG tablet, Take 1 tablet by mouth 2 (Two) Times a Day As Needed (TAKE ONE EVERY 12 HRS AS NEEDED FOR VERTIGO)., Disp: 60 tablet, Rfl: 2  •  fluticasone (FLONASE) 50 MCG/ACT nasal spray, 2 sprays into the nostril(s) as directed by provider Daily., Disp: 48 g, Rfl: 1  •  irbesartan (AVAPRO) 300 MG tablet, Take 1 tablet by mouth Daily., Disp: 90 tablet, Rfl: 1  •  levothyroxine (SYNTHROID, LEVOTHROID) 25 MCG tablet, Take 1 tablet by mouth Daily., Disp: 90 tablet, Rfl: 1  •  ondansetron (Zofran) 4 MG tablet, Take 1 tablet by mouth Every 8 (Eight) Hours As Needed for Nausea or Vomiting.,  "Disp: 20 tablet, Rfl: 5  •  pantoprazole (PROTONIX) 40 MG EC tablet, Take 1 tablet by mouth Daily., Disp: 90 tablet, Rfl: 1  •  simvastatin (ZOCOR) 20 MG tablet, Take 1 tablet by mouth Every Evening., Disp: 90 tablet, Rfl: 1  •  EPINEPHrine (EPIPEN) 0.3 MG/0.3ML solution auto-injector injection, ADMINISTER 0.3 ML IN THE MUSCLE 1 TIME FOR 1 DOSE AS DIRECTED BY PRESCRIBER, Disp: , Rfl:   •  gabapentin (NEURONTIN) 300 MG capsule, Take 300 mg by mouth 3 (Three) Times a Day., Disp: , Rfl:   •  HYDROcodone-acetaminophen (NORCO) 7.5-325 MG per tablet, , Disp: , Rfl:   •  lidocaine (LIDODERM) 5 %, Place 1 patch on the skin as directed by provider Daily., Disp: 90 patch, Rfl: 0  •  loratadine (CLARITIN) 10 MG tablet, Take 10 mg by mouth daily., Disp: , Rfl:   •  Multiple Vitamins-Minerals (CENTRAL-KEVON FOR SENIORS PO), Take 1 tablet by mouth Daily. HOLD PER MD INSTR, Disp: , Rfl:   •  Omega-3 Fatty Acids (FISH OIL) 1000 MG capsule capsule, Take 1,000 mg by mouth Daily. HOLD PER MD INSTR, Disp: , Rfl:   •  sildenafil (REVATIO) 20 MG tablet, Take 2-3 tabs QD prn, Disp: 60 tablet, Rfl: 11  •  Thiamine HCl (VITAMIN B-1) 100 MG tablet, Take 500 mg by mouth Daily. HOLD PER MD INSTR, Disp: , Rfl: .  He denies medication side effects.    All of the other chronic condition(s) listed above are stable w/o issues.    /76   Pulse 60   Temp 97.6 °F (36.4 °C) (Oral)   Resp 14   Ht 177.8 cm (70\")   Wt 91.6 kg (202 lb)   BMI 28.98 kg/m²     Results for orders placed or performed in visit on 11/08/22   Comprehensive metabolic panel    Specimen: Blood   Result Value Ref Range    Glucose 88 65 - 99 mg/dL    BUN 16 8 - 23 mg/dL    Creatinine 0.96 0.76 - 1.27 mg/dL    EGFR Result 81.4 >60.0 mL/min/1.73    BUN/Creatinine Ratio 16.7 7.0 - 25.0    Sodium 143 136 - 145 mmol/L    Potassium 4.4 3.5 - 5.2 mmol/L    Chloride 106 98 - 107 mmol/L    Total CO2 29.9 (H) 22.0 - 29.0 mmol/L    Calcium 9.1 8.6 - 10.5 mg/dL    Total Protein 5.8 (L) " 6.0 - 8.5 g/dL    Albumin 3.90 3.50 - 5.20 g/dL    Globulin 1.9 gm/dL    A/G Ratio 2.1 g/dL    Total Bilirubin 0.4 0.0 - 1.2 mg/dL    Alkaline Phosphatase 98 39 - 117 U/L    AST (SGOT) 14 1 - 40 U/L    ALT (SGPT) 15 1 - 41 U/L   Lipid panel    Specimen: Blood   Result Value Ref Range    Total Cholesterol 155 0 - 200 mg/dL    Triglycerides 140 0 - 150 mg/dL    HDL Cholesterol 39 (L) 40 - 60 mg/dL    VLDL Cholesterol Farrukh 25 5 - 40 mg/dL    LDL Chol Calc (NIH) 91 0 - 100 mg/dL   TSH    Specimen: Blood   Result Value Ref Range    TSH 3.010 0.270 - 4.200 uIU/mL   PSA SCREENING    Specimen: Blood   Result Value Ref Range    PSA <0.014 0.000 - 4.000 ng/mL   CBC w MANUAL Differential    Specimen: Blood   Result Value Ref Range    WBC 3.71 3.40 - 10.80 10*3/mm3    RBC 4.28 4.14 - 5.80 10*6/mm3    Hemoglobin 13.2 13.0 - 17.7 g/dL    Hematocrit 38.0 37.5 - 51.0 %    MCV 88.8 79.0 - 97.0 fL    MCH 30.8 26.6 - 33.0 pg    MCHC 34.7 31.5 - 35.7 g/dL    RDW 13.1 12.3 - 15.4 %    Platelets 210 140 - 450 10*3/mm3    Neutrophil Rel % 54.2 42.7 - 76.0 %    Lymphocyte Rel % 30.2 19.6 - 45.3 %    Monocyte Rel % 7.8 5.0 - 12.0 %    Eosinophil Rel % 6.5 (H) 0.3 - 6.2 %    Basophil Rel % 0.8 0.0 - 1.5 %    Neutrophils Absolute 2.01 1.70 - 7.00 10*3/mm3    Lymphocytes Absolute 1.12 0.70 - 3.10 10*3/mm3    Monocytes Absolute 0.29 0.10 - 0.90 10*3/mm3    Eosinophils Absolute 0.24 0.00 - 0.40 10*3/mm3    Basophils Absolute 0.03 0.00 - 0.20 10*3/mm3    Immature Granulocyte Rel % 0.5 0.0 - 0.5 %    Immature Grans Absolute 0.02 0.00 - 0.05 10*3/mm3    nRBC 0.0 0.0 - 0.2 /100 WBC             The following portions of the patient's history were reviewed and updated as appropriate: allergies, current medications, past family history, past medical history, past social history, past surgical history, and problem list.    Review of Systems   Constitutional: Negative for activity change, chills and fever.   Respiratory: Negative for cough.     Cardiovascular: Negative for chest pain.   Psychiatric/Behavioral: Negative for dysphoric mood.       Objective   Physical Exam  Constitutional:       General: He is not in acute distress.     Appearance: He is well-developed.   Cardiovascular:      Rate and Rhythm: Normal rate and regular rhythm.   Pulmonary:      Effort: Pulmonary effort is normal.      Breath sounds: Normal breath sounds.   Neurological:      Mental Status: He is alert and oriented to person, place, and time.   Psychiatric:         Behavior: Behavior normal.         Thought Content: Thought content normal.     Labs reviewed with pt today during visit. All questions answered.          Diagnoses and all orders for this visit:    1. Benign essential HTN (Primary)  -     irbesartan (AVAPRO) 300 MG tablet; Take 1 tablet by mouth Daily.  Dispense: 90 tablet; Refill: 1  -     amLODIPine (NORVASC) 5 MG tablet; Take 1 tablet by mouth Daily.  Dispense: 90 tablet; Refill: 1  -     Basic Metabolic Panel; Future  -     Lipid Panel; Future    2. Chronic seasonal allergic rhinitis due to pollen  -     fluticasone (FLONASE) 50 MCG/ACT nasal spray; 2 sprays into the nostril(s) as directed by provider Daily.  Dispense: 48 g; Refill: 1    3. Acquired hypothyroidism  -     levothyroxine (SYNTHROID, LEVOTHROID) 25 MCG tablet; Take 1 tablet by mouth Daily.  Dispense: 90 tablet; Refill: 1    4. Gastroesophageal reflux disease without esophagitis  -     pantoprazole (PROTONIX) 40 MG EC tablet; Take 1 tablet by mouth Daily.  Dispense: 90 tablet; Refill: 1    5. Mixed hyperlipidemia  -     simvastatin (ZOCOR) 20 MG tablet; Take 1 tablet by mouth Every Evening.  Dispense: 90 tablet; Refill: 1    6. Nausea  -     ondansetron (Zofran) 4 MG tablet; Take 1 tablet by mouth Every 8 (Eight) Hours As Needed for Nausea or Vomiting.  Dispense: 20 tablet; Refill: 5    7. Anxiety  -     diazePAM (VALIUM) 2 MG tablet; Take 1 tablet by mouth 2 (Two) Times a Day As Needed (TAKE ONE  EVERY 12 HRS AS NEEDED FOR VERTIGO).  Dispense: 60 tablet; Refill: 2

## 2022-11-16 ENCOUNTER — OFFICE VISIT (OUTPATIENT)
Dept: FAMILY MEDICINE CLINIC | Facility: CLINIC | Age: 77
End: 2022-11-16

## 2022-11-16 VITALS
SYSTOLIC BLOOD PRESSURE: 135 MMHG | HEART RATE: 60 BPM | TEMPERATURE: 97.6 F | BODY MASS INDEX: 28.92 KG/M2 | RESPIRATION RATE: 14 BRPM | DIASTOLIC BLOOD PRESSURE: 76 MMHG | HEIGHT: 70 IN | WEIGHT: 202 LBS

## 2022-11-16 DIAGNOSIS — E78.2 MIXED HYPERLIPIDEMIA: Chronic | ICD-10-CM

## 2022-11-16 DIAGNOSIS — K21.9 GASTROESOPHAGEAL REFLUX DISEASE WITHOUT ESOPHAGITIS: Chronic | ICD-10-CM

## 2022-11-16 DIAGNOSIS — E03.9 ACQUIRED HYPOTHYROIDISM: Chronic | ICD-10-CM

## 2022-11-16 DIAGNOSIS — J30.1 CHRONIC SEASONAL ALLERGIC RHINITIS DUE TO POLLEN: Chronic | ICD-10-CM

## 2022-11-16 DIAGNOSIS — R11.0 NAUSEA: ICD-10-CM

## 2022-11-16 DIAGNOSIS — I10 BENIGN ESSENTIAL HTN: Primary | Chronic | ICD-10-CM

## 2022-11-16 DIAGNOSIS — F41.9 ANXIETY: ICD-10-CM

## 2022-11-16 PROCEDURE — 99214 OFFICE O/P EST MOD 30 MIN: CPT | Performed by: FAMILY MEDICINE

## 2022-11-16 RX ORDER — IRBESARTAN 300 MG/1
300 TABLET ORAL DAILY
Qty: 90 TABLET | Refills: 1 | Status: SHIPPED | OUTPATIENT
Start: 2022-11-16

## 2022-11-16 RX ORDER — LEVOTHYROXINE SODIUM 0.03 MG/1
25 TABLET ORAL DAILY
Qty: 90 TABLET | Refills: 1 | Status: SHIPPED | OUTPATIENT
Start: 2022-11-16

## 2022-11-16 RX ORDER — GABAPENTIN 300 MG/1
300 CAPSULE ORAL 3 TIMES DAILY
COMMUNITY
Start: 2022-11-11

## 2022-11-16 RX ORDER — PANTOPRAZOLE SODIUM 40 MG/1
40 TABLET, DELAYED RELEASE ORAL DAILY
Qty: 90 TABLET | Refills: 1 | Status: SHIPPED | OUTPATIENT
Start: 2022-11-16

## 2022-11-16 RX ORDER — DIAZEPAM 2 MG/1
2 TABLET ORAL 2 TIMES DAILY PRN
Qty: 60 TABLET | Refills: 2 | Status: SHIPPED | OUTPATIENT
Start: 2022-11-16 | End: 2023-02-18 | Stop reason: SDUPTHER

## 2022-11-16 RX ORDER — FLUTICASONE PROPIONATE 50 MCG
2 SPRAY, SUSPENSION (ML) NASAL DAILY
Qty: 48 G | Refills: 1 | Status: SHIPPED | OUTPATIENT
Start: 2022-11-16

## 2022-11-16 RX ORDER — ONDANSETRON 4 MG/1
4 TABLET, FILM COATED ORAL EVERY 8 HOURS PRN
Qty: 20 TABLET | Refills: 5 | Status: SHIPPED | OUTPATIENT
Start: 2022-11-16

## 2022-11-16 RX ORDER — AMLODIPINE BESYLATE 5 MG/1
5 TABLET ORAL DAILY
Qty: 90 TABLET | Refills: 1 | Status: SHIPPED | OUTPATIENT
Start: 2022-11-16

## 2022-11-16 RX ORDER — SIMVASTATIN 20 MG
20 TABLET ORAL EVERY EVENING
Qty: 90 TABLET | Refills: 1 | Status: SHIPPED | OUTPATIENT
Start: 2022-11-16

## 2022-11-29 ENCOUNTER — TELEPHONE (OUTPATIENT)
Dept: NEUROSURGERY | Facility: CLINIC | Age: 77
End: 2022-11-29

## 2022-11-29 NOTE — TELEPHONE ENCOUNTER
Caller: Kali Crespo    Relationship to patient: Self    Best call back number: [phone]    Chief complaint: PT ADVISE HE WENT LAST Tuesday TO GET SOME EPIDURALS AND NOW HIS LEFT LEG IS HURTING SO BAD WHERE THE INJECTION SITE IS LOCATED.     Type of visit: F/U    Requested date: ASAP    Additional notes:PT SAW SUKHJINDER AFTER A HOSPITAL VISIT. HE IS REQUESTING ANOTHER APPT.     PLEASE CALL TO SCHEDULE    THANK YOU,

## 2022-11-30 ENCOUNTER — OFFICE VISIT (OUTPATIENT)
Dept: NEUROSURGERY | Facility: CLINIC | Age: 77
End: 2022-11-30

## 2022-11-30 VITALS
TEMPERATURE: 97.3 F | BODY MASS INDEX: 28.92 KG/M2 | WEIGHT: 202 LBS | OXYGEN SATURATION: 98 % | DIASTOLIC BLOOD PRESSURE: 90 MMHG | HEART RATE: 99 BPM | HEIGHT: 70 IN | SYSTOLIC BLOOD PRESSURE: 150 MMHG

## 2022-11-30 DIAGNOSIS — R20.0 LEFT LEG NUMBNESS: ICD-10-CM

## 2022-11-30 DIAGNOSIS — G89.29 CHRONIC LOW BACK PAIN WITHOUT SCIATICA, UNSPECIFIED BACK PAIN LATERALITY: Primary | ICD-10-CM

## 2022-11-30 DIAGNOSIS — M54.10 RADICULOPATHY OF LEG: ICD-10-CM

## 2022-11-30 DIAGNOSIS — M54.50 CHRONIC LOW BACK PAIN WITHOUT SCIATICA, UNSPECIFIED BACK PAIN LATERALITY: Primary | ICD-10-CM

## 2022-11-30 PROCEDURE — 99214 OFFICE O/P EST MOD 30 MIN: CPT | Performed by: NURSE PRACTITIONER

## 2022-11-30 RX ORDER — METHYLPREDNISOLONE 4 MG/1
TABLET ORAL
Qty: 21 TABLET | Refills: 0 | Status: SHIPPED | OUTPATIENT
Start: 2022-11-30 | End: 2023-02-20

## 2022-11-30 NOTE — PROGRESS NOTES
"Subjective   History of Present Illness: Kali Crespo is a 77 y.o. male is here today for follow-up. Mr. Crespo was last seen on 07-11-22 with no complaints of back pain.  Last visit with pain management was 8/31/22, where he received an BRII.      Today, Mr. Fitzpatrick reports he is having worsening left sided lower back pain. He reports the pain started a couple days after receiving an epidural last Tuesday. He reports the pain radiated to his left leg. He reports of numbness and tingling in his left leg. He reports of left leg weakness. He denies any bowel or bladder incontinence. He reports he is taking hydrocodone and tylenol for pain. He had a BRII in July, and the BRII was successful.  He states that he had an BRII last Tuesday, with Dr. Costello, and he states that the right BRII worked, and he has had increased pain left BRII injection worked 2 days and then increased in the left leg since then.  He is here with increased pain in his left leg that is radiating down to his foot with increased pain.Takes hydrocodone and gabapentin daily for pain, and states that his pain is 10/10 in back and left leg.      The following portions of the patient's history were reviewed and updated as appropriate: allergies, current medications, past family history, past medical history, past social history, past surgical history and problem list.    Review of Systems   Constitutional: Positive for activity change.   Musculoskeletal: Positive for back pain ( left sided).   Neurological: Positive for weakness ( left leg) and numbness ( left leg).   Psychiatric/Behavioral: Positive for sleep disturbance.   All other systems reviewed and are negative.      Objective     Vitals:    11/30/22 1358   BP: 150/90   BP Location: Left arm   Patient Position: Sitting   Cuff Size: Adult   Pulse: 99   Temp: 97.3 °F (36.3 °C)   TempSrc: Infrared   SpO2: 98%   Weight: 91.6 kg (202 lb)   Height: 177.8 cm (70\")     Body mass index is 28.98 " kg/m².    Tobacco Use: Medium Risk   • Smoking Tobacco Use: Former   • Smokeless Tobacco Use: Never   • Passive Exposure: Not on file        Physical Exam  Vitals and nursing note reviewed.   HENT:      Head: Normocephalic and atraumatic.   Eyes:      Conjunctiva/sclera: Conjunctivae normal.   Cardiovascular:      Rate and Rhythm: Normal rate.      Pulses: Normal pulses.   Pulmonary:      Effort: Pulmonary effort is normal.      Breath sounds: Normal air entry.   Musculoskeletal:         General: Normal range of motion.      Cervical back: Normal range of motion and neck supple.      Right lower leg: No edema.      Left lower leg: No edema.   Skin:     General: Skin is warm and dry.   Neurological:      Mental Status: He is oriented to person, place, and time.      GCS: GCS eye subscore is 4. GCS verbal subscore is 5. GCS motor subscore is 6.      Sensory: Sensory deficit present.      Gait: Gait normal.   Psychiatric:         Attention and Perception: Attention normal.         Mood and Affect: Mood normal.         Speech: Speech normal.         Behavior: Behavior is cooperative.         Cognition and Memory: Cognition and memory normal.       Neurologic Exam     Mental Status   Oriented to person, place, and time.   Speech: speech is normal     Motor Exam   Muscle bulk: normal    Strength   Strength 5/5 except as noted.   Right iliopsoas: 4/5  Right hamstrin/5    Sensory Exam   Burning all the way down from hip to ankle lateral side of left leg     Gait, Coordination, and Reflexes     Tremor   Resting tremor: absent  Intention tremor: absent  Action tremor: absent    Reflexes   Right ankle clonus: absent  Left ankle clonus: absent          Assessment & Plan   Independent Review of Radiographic Studies:      I personally reviewed the previous report from the lumbar MRI.    Medical Decision Making:    He is requesting a facet injection for the left leg, as he previously has had an L4-5 left facet injection in  June that helped tremendously with his left leg pain.  I explained that I am sure unsure if he is able to have another injection, as he just recently had an injection done last week, and is well in June.  I have asked the MA to call and schedule, if appropriate for him and have him return in approximately 4 weeks for a follow-up.  In addition, I have given him an order for a Medrol Dosepak, as he may be unable to have the epidural steroid injection, and he feels his pain is increased at 10/10 presently.  He is to continue to take his gabapentin and his Norco as previously prescribed from pain management, I have explained he can use heat/ice, and Voltaren cream, or other type of pain cream, as needed.  I have explained that he should call the office with any questions, or concerns that may arise.  He is in agreement with this plan.    Diagnoses and all orders for this visit:    1. Chronic low back pain without sciatica, unspecified back pain laterality (Primary)  -     Epidural Block    2. Left leg numbness  -     Epidural Block    3. Radiculopathy of leg  -     Epidural Block    Other orders  -     methylPREDNISolone (MEDROL) 4 MG dose pack; Take as directed on package instructions.  Dispense: 21 tablet; Refill: 0      Return in about 4 weeks (around 12/28/2022) for any APC, after injections.

## 2022-11-30 NOTE — TELEPHONE ENCOUNTER
PT CALLED AGAIN WANTED TO SEE SUKHJINDER TODAY. PT STATES HE IS IN SO MUCH PAIN THAT HE CAN NOT WALK. I ADVISE OFFICE SHOULD BE CALLING TO MAKE HIM AN APPT BUT IF HE IS UNABLE TO HANDLE THE PAIN HE SHOULD GO TO THE ED. PLEASE CALL [phone]. PT WOULD LIKE SOMEONE TO CALL HIM BACK TODAY TO DISCUSS HIS ISSUES.     THANK YOU,

## 2022-12-09 ENCOUNTER — HOSPITAL ENCOUNTER (OUTPATIENT)
Dept: PAIN MEDICINE | Facility: HOSPITAL | Age: 77
Discharge: HOME | End: 2022-12-09

## 2022-12-09 ENCOUNTER — ANESTHESIA (OUTPATIENT)
Dept: PAIN MEDICINE | Facility: HOSPITAL | Age: 77
End: 2022-12-09

## 2022-12-09 ENCOUNTER — ANESTHESIA EVENT (OUTPATIENT)
Dept: PAIN MEDICINE | Facility: HOSPITAL | Age: 77
End: 2022-12-09

## 2022-12-09 ENCOUNTER — HOSPITAL ENCOUNTER (OUTPATIENT)
Dept: GENERAL RADIOLOGY | Facility: HOSPITAL | Age: 77
Discharge: HOME | End: 2022-12-09

## 2022-12-09 VITALS
TEMPERATURE: 98.5 F | RESPIRATION RATE: 14 BRPM | OXYGEN SATURATION: 99 % | SYSTOLIC BLOOD PRESSURE: 149 MMHG | HEART RATE: 80 BPM | DIASTOLIC BLOOD PRESSURE: 91 MMHG

## 2022-12-09 DIAGNOSIS — M47.816 SPONDYLOSIS OF LUMBAR REGION WITHOUT MYELOPATHY OR RADICULOPATHY: Primary | ICD-10-CM

## 2022-12-09 DIAGNOSIS — R52 PAIN: ICD-10-CM

## 2022-12-09 PROCEDURE — 25010000002 DEXAMETHASONE SODIUM PHOSPHATE 10 MG/ML SOLUTION: Performed by: ANESTHESIOLOGY

## 2022-12-09 PROCEDURE — 77003 FLUOROGUIDE FOR SPINE INJECT: CPT

## 2022-12-09 PROCEDURE — 25010000002 MIDAZOLAM PER 1 MG: Performed by: ANESTHESIOLOGY

## 2022-12-09 PROCEDURE — 25010000002 FENTANYL CITRATE (PF) 50 MCG/ML SOLUTION: Performed by: ANESTHESIOLOGY

## 2022-12-09 RX ORDER — SODIUM CHLORIDE 0.9 % (FLUSH) 0.9 %
10 SYRINGE (ML) INJECTION AS NEEDED
Status: DISCONTINUED | OUTPATIENT
Start: 2022-12-09 | End: 2022-12-10 | Stop reason: HOSPADM

## 2022-12-09 RX ORDER — SODIUM CHLORIDE 9 MG/ML
40 INJECTION, SOLUTION INTRAVENOUS AS NEEDED
Status: DISCONTINUED | OUTPATIENT
Start: 2022-12-09 | End: 2022-12-10 | Stop reason: HOSPADM

## 2022-12-09 RX ORDER — MIDAZOLAM HYDROCHLORIDE 1 MG/ML
1 INJECTION INTRAMUSCULAR; INTRAVENOUS AS NEEDED
Status: DISCONTINUED | OUTPATIENT
Start: 2022-12-09 | End: 2022-12-10 | Stop reason: HOSPADM

## 2022-12-09 RX ORDER — SODIUM CHLORIDE 0.9 % (FLUSH) 0.9 %
1-10 SYRINGE (ML) INJECTION AS NEEDED
Status: DISCONTINUED | OUTPATIENT
Start: 2022-12-09 | End: 2022-12-10 | Stop reason: HOSPADM

## 2022-12-09 RX ORDER — SODIUM CHLORIDE 0.9 % (FLUSH) 0.9 %
10 SYRINGE (ML) INJECTION EVERY 12 HOURS SCHEDULED
Status: DISCONTINUED | OUTPATIENT
Start: 2022-12-09 | End: 2022-12-10 | Stop reason: HOSPADM

## 2022-12-09 RX ORDER — DEXAMETHASONE SODIUM PHOSPHATE 10 MG/ML
10 INJECTION, SOLUTION INTRAMUSCULAR; INTRAVENOUS ONCE
Status: COMPLETED | OUTPATIENT
Start: 2022-12-09 | End: 2022-12-09

## 2022-12-09 RX ORDER — LIDOCAINE HYDROCHLORIDE 10 MG/ML
1 INJECTION, SOLUTION INFILTRATION; PERINEURAL ONCE AS NEEDED
Status: DISCONTINUED | OUTPATIENT
Start: 2022-12-09 | End: 2022-12-10 | Stop reason: HOSPADM

## 2022-12-09 RX ORDER — FENTANYL CITRATE 50 UG/ML
50 INJECTION, SOLUTION INTRAMUSCULAR; INTRAVENOUS AS NEEDED
Status: DISCONTINUED | OUTPATIENT
Start: 2022-12-09 | End: 2022-12-10 | Stop reason: HOSPADM

## 2022-12-09 RX ADMIN — MIDAZOLAM 1 MG: 1 INJECTION INTRAMUSCULAR; INTRAVENOUS at 09:33

## 2022-12-09 RX ADMIN — DEXAMETHASONE SODIUM PHOSPHATE 10 MG: 10 INJECTION INTRAMUSCULAR; INTRAVENOUS at 09:32

## 2022-12-09 RX ADMIN — FENTANYL CITRATE 50 MCG: 50 INJECTION, SOLUTION INTRAMUSCULAR; INTRAVENOUS at 09:33

## 2022-12-09 NOTE — H&P
[facility]    History and Physical    Patient Name: Kali Crespo  :  1945  MRN:  [id number]  Date of Admission: 2022    Subjective     Patient is a 77 y.o. male presents with chief complaint of acute on chronic, constant, severe low back and left lower extremity pain.  Onset of symptoms was abrupt starting 2 weeks ago.  Symptoms are associated/aggravated by lying down, sitting, standing, straining or walking for more than a few minutes. Symptoms improve with pain medication.  This pain was initiated following a lumbar epidural steroid injection at [facility] on 2022.  On a pain scale from 0-10, he rates his pain as a 9 while at rest and also at 9 with activity.  He describes the pain is sharp, stabbing, throbbing and burning in nature.  He was referred to the pain clinic for a series of lumbar facet injections which have helped him in the past.    His most recent lumbar MRI results are as follows:    There is transitional anatomy at the lumbosacral junction. For purposes  of this dictation the lowermost liquefied disc is labeled as S1-S2.  Previous MRI of the lumbar spine dated 2022 used this same  labeling scheme.     At L1-2 there is a mild broad-based disc bulge with some mild flattening  of the anterior thecal sac. Mild facet joint arthropathy is seen with  minimal canal stenosis.     At L2-3 there is a minimal broad-based disc bulge with no significant  canal stenosis.     At L3-4 there is minimal broad-based disc bulge with no significant  canal stenosis.     At L4-5 there is a mild broad-based disc bulge with mild foraminal  narrowing and facet joint arthropathy. Minimal concentric canal stenosis  is seen.     At L5-S1 there is a moderate size posterior central disc protrusion with  some flattening of the anterior thecal sac. There is mild foraminal  narrowing, facet joint arthropathy and mild canal stenosis.      The following portions of the patients history  were reviewed and updated as appropriate: current medications, allergies, past medical history, past surgical history, past family history, past social history and problem list                Objective     Past Medical History:   Past Medical History:   Diagnosis Date   • Anxiety    • DDD (degenerative disc disease), cervical    • DDD (degenerative disc disease), lumbosacral    • Erectile dysfunction    • GERD (gastroesophageal reflux disease)    • H/O complete eye exam 2017   • Headache    • Hyperlipidemia    • Hypertension    • Hyperthyroidism    • Measles    • Meniere disease    • Mumps    • Prostate cancer (HCC)      Past Surgical History:   Past Surgical History:   Procedure Laterality Date   • CATARACT EXTRACTION     • COLONOSCOPY     • EYE SURGERY     • KNEE SURGERY     • PROSTATE SURGERY     • SINUS SURGERY     • TONSILLECTOMY     • TRIGGER FINGER RELEASE Left 2021    Procedure: LEFT RING FINGER EXTENSOR TENDON REPAIR;  Surgeon: David Leonard MD;  [place] [facility] [place] [facility] [place]: [place]opedics;  Laterality: Left;     Family History:   Family History   Problem Relation Age of Onset   • Heart disease Mother    • Stroke Mother    • Cancer Father         prostate   • Migraines Sister    • Malig Hyperthermia Neg Hx      Social History:   Social History     Socioeconomic History   • Marital status:    Tobacco Use   • Smoking status: Former     Packs/day: 0.25     Years: 4.00     Pack years: 1.00     Types: Cigarettes     Start date:      Quit date:      Years since quittin.9   • Smokeless tobacco: Never   • Tobacco comments:     40 years ago   Vaping Use   • Vaping Use: Never used   Substance and Sexual Activity   • Alcohol use: No     Comment: seldom   • Drug use: No   • Sexual activity: Defer       Vital Signs Range for the last 24 hours  Temperature: Temp:  [36.9 °C (98.5 °F)] 36.9 °C (98.5 °F)   Temp Source: Temp src: Oral   BP: BP: (150)/(99) 150/99   Pulse: Heart Rate:  [68]  68   Respirations: Resp:  [16] 16   SPO2: SpO2:  [95 %] 95 %   O2 Amount (l/min):     O2 Devices Device (Oxygen Therapy): room air   Weight:           --------------------------------------------------------------------------------    Current Outpatient Medications   Medication Sig Dispense Refill   • amLODIPine (NORVASC) 5 MG tablet Take 1 tablet by mouth Daily. 90 tablet 1   • diazePAM (VALIUM) 2 MG tablet Take 1 tablet by mouth 2 (Two) Times a Day As Needed (TAKE ONE EVERY 12 HRS AS NEEDED FOR VERTIGO). 60 tablet 2   • EPINEPHrine (EPIPEN) 0.3 MG/0.3ML solution auto-injector injection ADMINISTER 0.3 ML IN THE MUSCLE 1 TIME FOR 1 DOSE AS DIRECTED BY PRESCRIBER     • fluticasone (FLONASE) 50 MCG/ACT nasal spray 2 sprays into the nostril(s) as directed by provider Daily. 48 g 1   • gabapentin (NEURONTIN) 300 MG capsule Take 300 mg by mouth 3 (Three) Times a Day.     • HYDROcodone-acetaminophen (NORCO) 7.5-325 MG per tablet      • irbesartan (AVAPRO) 300 MG tablet Take 1 tablet by mouth Daily. 90 tablet 1   • levothyroxine (SYNTHROID, LEVOTHROID) 25 MCG tablet Take 1 tablet by mouth Daily. 90 tablet 1   • loratadine (CLARITIN) 10 MG tablet Take 10 mg by mouth daily.     • methylPREDNISolone (MEDROL) 4 MG dose pack Take as directed on package instructions. 21 tablet 0   • Multiple Vitamins-Minerals (CENTRAL-KEVON FOR SENIORS PO) Take 1 tablet by mouth Daily. HOLD PER MD INSTR     • Omega-3 Fatty Acids (FISH OIL) 1000 MG capsule capsule Take 1,000 mg by mouth Daily. HOLD PER MD INSTR     • ondansetron (Zofran) 4 MG tablet Take 1 tablet by mouth Every 8 (Eight) Hours As Needed for Nausea or Vomiting. 20 tablet 5   • pantoprazole (PROTONIX) 40 MG EC tablet Take 1 tablet by mouth Daily. 90 tablet 1   • sildenafil (REVATIO) 20 MG tablet Take 2-3 tabs QD prn 60 tablet 11   • simvastatin (ZOCOR) 20 MG tablet Take 1 tablet by mouth Every Evening. 90 tablet 1   • Thiamine HCl (VITAMIN B-1) 100 MG tablet Take 500 mg by mouth  Daily. HOLD PER MD INSTR       Current Facility-Administered Medications   Medication Dose Route Frequency Provider Last Rate Last Admin   • dexamethasone sodium phosphate injection 10 mg  10 mg Intra-articular Once En Ngo MD       • fentaNYL citrate (PF) (SUBLIMAZE) injection 50 mcg  50 mcg Intravenous PRN En Ngo MD       • lidocaine (XYLOCAINE) 1 % injection 1 mL  1 mL Intradermal Once PRN En Ngo MD       • midazolam (VERSED) injection 1 mg  1 mg Intravenous PRN En Ngo MD       • sodium chloride 0.9 % flush 1-10 mL  1-10 mL Intravenous PRN En Ngo MD       • sodium chloride 0.9 % flush 10 mL  10 mL Intravenous Q12H En Ngo MD       • sodium chloride 0.9 % flush 10 mL  10 mL Intravenous PRN En Ngo MD       • sodium chloride 0.9 % infusion 40 mL  40 mL Intravenous CAROLYNEN En Ngo MD           --------------------------------------------------------------------------------  Assessment & Plan      Anesthesia Evaluation     Patient summary reviewed and Nursing notes reviewed   NPO Solid Status: > 8 hours  NPO Liquid Status: > 2 hours    Pain impairs ability to perform ADLs: Bathing, Dressing, Ambulation and Sleeping  Modalities previously tried to control pain with limited effectiveness within the last 4-6 weeks: Ice, Rest, Heat, OTC medications, Steroids and Prescription medications     Airway   Mallampati: II  TM distance: >3 FB  Neck ROM: full  Dental - normal exam     Pulmonary - normal exam    breath sounds clear to auscultation  (+) a smoker Former,   Cardiovascular - normal exam    Rhythm: regular  Rate: normal    (+) hypertension, hyperlipidemia,   (-) angina, orthopnea, PND, MARCOS      Neuro/Psych  (+) headaches, dizziness/light headedness, psychiatric history Anxiety,    GI/Hepatic/Renal/Endo    (+)  GERD,  thyroid problem     Musculoskeletal     (+) neck pain,   Abdominal    Substance History - negative use     OB/GYN negative  ob/gyn ROS         Other   arthritis,    history of cancer (Prostate)             Diagnosis and Plan    Treatment Plan  ASA 3      Procedures: Facet injection, With fluoroscopy,       Anesthetic plan and risks discussed with patient.        Alternative management options include physical therapy, medical management with nonsteroidal anti-inflammatory medications or narcotics, chiropractic manipulation and surgical intervention.    1.  Lumbar facet injections, up to 3, spaced 1-2 weeks apart.  If pain control is acceptable after 1 or 2 injections, it was discussed with the patient that they may return for the subsequent injections if and when their pain returns.  The risks were discussed with the patient including failure of relief, worsening pain, Headache (post dural puncture headache), bleeding (epidural hematoma) and infection (epidural abscess or skin infection).  2.  Physical therapy exercises at home as prescribed by physical therapy or from the pain clinic handout (given to the patient).  Continuation of these exercises every day, or multiple times per week, even when the patient has good pain relief, was stressed to the patient as a preventative measure to decrease the frequency and severity of future pain episodes.  3.  Continue pain medicines as already prescribed.  If patient not currently taking any, it is recommended to begin Acetaminophen 1000 mg po q 8 hours.  If other medicines containing Acetaminophen are currently prescribed, maintain daily dose at 3000 mg.    4.  If they can tolerate NSAIDS, it is recommended to take Ibuprofen 600 mg po q 6 hours for 7 days during pain exacerbations.  Alternatively, they may substitute an NSAID of their choice (e.g. Aleve).  This may be taken at the same time as Acetaminophen.  5.  Heat and ice to the affected area as tolerated for pain control.  It was discussed that heating pads can cause burns.  6.  Daily low impact exercise such as walking or water exercise  was recommended to maintain overall health and aid in weight control.   7.  Follow up as needed for subsequent injections.  8.  Patient was counseled to abstain from tobacco products.      Diagnosis     * Spondylosis without myelopathy or radiculopathy, lumbar region [M47.816]     * Lumbar facet arthropathy [M47.816]

## 2022-12-09 NOTE — ANESTHESIA PROCEDURE NOTES
PAIN Intra-articular Facet Injection      Patient reassessed immediately prior to procedure    Patient location during procedure: Pain Clinic  Start time: 12/9/2022 9:20 AM  Stop Time: 12/9/2022 9:47 AM    Reason for block: procedure for pain  Performed by  Anesthesiologist: En Ngo MD  Preanesthetic Checklist  Completed: patient identified, IV checked, site marked, risks and benefits discussed, surgical consent, monitors and equipment checked, pre-op evaluation and timeout performed  Prep:  Patient position: prone  Sterile barriers:cap, gloves and mask  Prep: ChloraPrep  Patient monitoring: EKG, continuous pulse oximetry and blood pressure monitoring  Procedure:  Sedation:yes (Midazolam 1 mg and fentanyl 50 mcg IV.  Sedation times were from 9:25 AM to 9:47 AM.)  Approach:unilateral left  Guidance:fluoroscopy  Location:lumbar  Needle Type:Quincke  Needle Gauge:25 G  Medications:  Comment:Dexamethasone 10 mg    Post Assessment  Patient Tolerance:comfortable throughout block  Complications:no  Additional Notes  Post-Op Diagnosis Codes:     * Spondylosis without myelopathy or radiculopathy, lumbar region (M47.816)     * Lumbar facet arthropathy (M47.816)            
4 = No assist / stand by assistance

## 2022-12-15 ENCOUNTER — TELEPHONE (OUTPATIENT)
Dept: NEUROSURGERY | Facility: CLINIC | Age: 77
End: 2022-12-15

## 2022-12-15 NOTE — TELEPHONE ENCOUNTER
Caller: Kali Crespo    Relationship to patient: Self    Best call back number:[phone]    Patient is needing:     PATIENT STATES HE HAD HIS FACET JOINT INJECTION ON 12/9, THIS HELPED FOR A FEW DAYS, BUT THE PAIN HAS RETURNED AND REPORTS HIS PAIN EXCRUCIATING, 10/10.      PATIENT HAS TRIED PATCHES, ICE TX, PAIN MEDS, AND IS GETTING NO RELIEF. NOT ABLE TO SLEEP.    PATIENT CALLED PAIN MANAGEMENT THIS MORNING AND THEY ADVISED PATIENT TO GET BACK WITH NS.    PLEASE CALL TO ADVISE

## 2022-12-16 ENCOUNTER — OFFICE VISIT (OUTPATIENT)
Dept: FAMILY MEDICINE CLINIC | Facility: CLINIC | Age: 77
End: 2022-12-16

## 2022-12-16 VITALS
RESPIRATION RATE: 16 BRPM | BODY MASS INDEX: 27.35 KG/M2 | TEMPERATURE: 97.5 F | OXYGEN SATURATION: 97 % | WEIGHT: 191 LBS | HEART RATE: 72 BPM | DIASTOLIC BLOOD PRESSURE: 80 MMHG | SYSTOLIC BLOOD PRESSURE: 122 MMHG | HEIGHT: 70 IN

## 2022-12-16 DIAGNOSIS — M25.562 ACUTE PAIN OF LEFT KNEE: Primary | ICD-10-CM

## 2022-12-16 DIAGNOSIS — M47.816 SPONDYLOSIS OF LUMBAR REGION WITHOUT MYELOPATHY OR RADICULOPATHY: ICD-10-CM

## 2022-12-16 DIAGNOSIS — M54.59 INTRACTABLE LOW BACK PAIN: ICD-10-CM

## 2022-12-16 PROBLEM — M54.16 LUMBAR RADICULOPATHY: Status: ACTIVE | Noted: 2022-12-16

## 2022-12-16 PROCEDURE — 99213 OFFICE O/P EST LOW 20 MIN: CPT

## 2022-12-16 PROCEDURE — 73562 X-RAY EXAM OF KNEE 3: CPT

## 2022-12-16 NOTE — PROGRESS NOTES
Answers for HPI/ROS submitted by the patient on 12/14/2022  What is the primary reason for your visit?: Other  Please describe your symptoms.: knee pain  Have you had these symptoms before?: Yes  How long have you been having these symptoms?: 5-7 days  Please list any medications you are currently taking for this condition.: Ibuprofin  Please describe any probable cause for these symptoms. : Resulted from getting epidurals a [facility]  Pain Management    Chief Complaint  Knee Pain    Subjective          History of Present Illness    The patient complains of left knee pain. The symptoms began 3 weeks ago.  Pain started following an epidural lumbar block. Pain is located at the left knee region. Discomfort is described as aching and sharp. Symptoms are exacerbated by bending, weight bearing and standing.  Evaluation to date: none. Therapy to date includes: OTC NSAIDs, heat, ice, pain medication, and lidocaine patches.  He is established with pain management.   He denies fever, chills, loss of bowel or bladder function, nausea, vomiting, near-syncope, and syncope.     He is using a walker due to chronic lumbar back pain and left knee pain.    The patient had an epidural lumbar block last on 11/22/2022 and a facet joint injection last Friday.    Note from MICHELA Light at Dr. Diaz;'s office:  He states that he just had another transforaminal injection a few days ago, that I ordered, because he stated the other injection worked really well.  He states that his pain is back to 10/10 and he is unable to sleep related to left leg pain.  He does have an appointment with his primary care tomorrow to see if there is anything else he can do.  I did not know if you thought his disc herniation was worrisome enough for possible surgery, he states he has been dealing with this off and on for a little while.  Is not wanting to go back to [facility], I can always refer to another pain management facility, if you feel  "that is necessary.     Review of Systems   Constitutional: Negative for appetite change, chills, fatigue and fever.   HENT: Negative for congestion, sinus pressure and trouble swallowing.    Eyes: Negative for visual disturbance.   Respiratory: Negative for cough, chest tightness, shortness of breath and wheezing.    Cardiovascular: Negative for chest pain, palpitations and leg swelling.   Gastrointestinal: Negative for abdominal pain, constipation, diarrhea, nausea and vomiting.   Genitourinary: Negative for dysuria, frequency and urgency.   Musculoskeletal: Positive for arthralgias (left knee pain). Negative for gait problem, joint swelling, myalgias and neck pain.   Skin: Negative for rash.   Neurological: Negative for dizziness, syncope, light-headedness and numbness.   Psychiatric/Behavioral: Negative for dysphoric mood. The patient is not nervous/anxious.         Objective   Vital Signs:   /80 (BP Location: Left arm, Patient Position: Sitting, Cuff Size: Adult)   Pulse 72   Temp 97.5 °F (36.4 °C) (Oral)   Resp 16   Ht 177.8 cm (70\")   Wt 86.6 kg (191 lb)   SpO2 97%   BMI 27.41 kg/m²        Physical Exam  Vitals and nursing note reviewed.   Constitutional:       General: He is not in acute distress.     Appearance: Normal appearance. He is well-developed. He is not diaphoretic.   HENT:      Head: Normocephalic and atraumatic.   Eyes:      General: No scleral icterus.     Conjunctiva/sclera: Conjunctivae normal.      Pupils: Pupils are equal, round, and reactive to light.   Cardiovascular:      Rate and Rhythm: Normal rate and regular rhythm.      Pulses: Normal pulses.           Dorsalis pedis pulses are 2+ on the right side and 2+ on the left side.        Posterior tibial pulses are 2+ on the right side and 2+ on the left side.      Heart sounds: Normal heart sounds. No murmur heard.    No gallop.   Pulmonary:      Effort: Pulmonary effort is normal. No respiratory distress.      Breath sounds: " Normal breath sounds. No wheezing or rales.   Chest:      Chest wall: No tenderness.   Abdominal:      Palpations: Abdomen is soft.      Tenderness: There is no abdominal tenderness.   Musculoskeletal:         General: Tenderness present. No deformity.      Cervical back: Normal range of motion and neck supple.      Left knee: Bony tenderness present. No swelling, erythema, lacerations or crepitus. Decreased range of motion. Tenderness present over the patellar tendon. Normal alignment and normal patellar mobility. Normal pulse.      Comments: Tenderness with palpation at left patella and left patellar tendon.  Decreased range of motion due to pain.  No swelling, edema, or warmth.  DP and PT pulses are intact and regular.   Skin:     General: Skin is warm and dry.      Findings: No rash.   Neurological:      Mental Status: He is alert and oriented to person, place, and time.      Sensory: Sensation is intact. No sensory deficit.      Motor: Motor function is intact. No tremor, atrophy or abnormal muscle tone.      Coordination: Coordination is intact. Coordination normal.      Gait: Gait is intact. Gait normal.      Deep Tendon Reflexes: Reflexes are normal and symmetric. Reflexes normal.      Reflex Scores:       Patellar reflexes are 2+ on the right side and 2+ on the left side.       Achilles reflexes are 2+ on the right side and 2+ on the left side.     Comments: Gait is steady with assistance of walker.   Psychiatric:         Behavior: Behavior normal.         Thought Content: Thought content normal.         Judgment: Judgment normal.                         Assessment and Plan      Diagnoses and all orders for this visit:    1. Acute pain of left knee (Primary)  Comments:  X-ray today-pending Radiology review  RICE therapy, NSAID's as needed  Referral to pain management  Return if symptoms worsen  Orders:  -     XR Knee 3 View Left  -     Ambulatory Referral to Pain Management    2. Intractable low back  pain  -     Ambulatory Referral to Pain Management    3. Spondylosis 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 myelopathy or radiculopathy  -     Ambulatory Referral to Pain Management            Follow Up     Return if symptoms worsen or fail to improve.    Patient was given instructions and counseling regarding his condition or for health maintenance advice. Please see specific information pulled into the AVS if appropriate.     -Left knee X-ray to day showed bone spurring and degenerative changes.  X-ray interpreted by myself.  X-ray will be sent to Radiology for further interpretation.  -Referral to [facility].  -Maintain a healthy weight.   -Apply ice 2-3 times per day. Place a towel between your skin and the bag.  -Return to the office is symptoms worsen or fail to improve.  -Seek immediate medical attention for fever, chills, loss of bowel or bladder function, lower extremity weakness or numbness, pain going down your legs, nausea, vomiting, near-syncope, or syncope.

## 2022-12-19 NOTE — PROGRESS NOTES
Subjective   Patient ID: Kali Crespo is a 77 y.o. male is here today for follow-up after having a facet injection.    Today, Mr. Crespo reports    Patient is with his wife.  He said chronic low back pain for years.  It started to get worse in about 2015.  He has been working with [facility] specifically Dr. Costello.  We saw him as a consult in the hospital in June when he had an ablation and he felt that it made him worse.  He had to be in the hospital for a while and things settle down to be followed up to see our nurse practitioners on 2 occasions.  He has not had a repeat ablation at [facility].  But he has had some epidural blocks.  Most of his pain is in the back itself and intermittently runs down the legs that he does have sciatica.  Now it is mostly on the left.  The last epidural block by [facility] which was done in November seem to help his right leg.  But after questioning him multiple times I came to the conclusion that it is the left side of his back that hurts him the worst precisely where he got worse when he had the ablation some 7 months ago.  He has no motor deficits.  He does have some mechanical signs in the sense that when he leans to the left or leans backwards he can aggravate his left-sided sciatica but the degree of stenosis is really on the mild side.  I am not enthusiastic about trying to decompress him and less his sciatica becomes a lot more intractable and right now it still the left-sided axial pain probably from facet disease.  He had expressed desire to see Dr. Ho switch pain management groups.  His primary care physician's office is trying to arrange that but I told him I thought perhaps I could expedite the process by making the referral myself.  He is on pain medications from [facility] and I told him to continue that relationship for now until the switch over is complete so he is not without pain medications and the transition.  He is also on 300 mg 3 times  "daily of gabapentin I told him to talk to [facility] about increasing it to 600 mg 3 times daily.  All of them take care of it since they have been taking care of all of his pain medications.  We will make the referral to [facility] and have him come back in 4 months to see if if the radicular pain has worsened.  Right now is not the main concern.  If it does we need to consider surgical treatment for it he would need a myelogram.    History of Present Illness    The following portions of the patient's history were reviewed and updated as appropriate: allergies, current medications, past family history, past medical history, past social history, past surgical history and problem list.    Review of Systems   Constitutional: Negative for fever.   Musculoskeletal: Positive for back pain, gait problem and joint swelling.   Neurological: Positive for weakness.   Psychiatric/Behavioral: Positive for sleep disturbance.   All other systems reviewed and are negative.          Objective     Vitals:    12/20/22 1016   BP: 150/90   Pulse: 82   SpO2: 99%   Weight: 88.9 kg (196 lb)   Height: 177.8 cm (70\")     Body mass index is 28.12 kg/m².    Tobacco Use: Medium Risk   • Smoking Tobacco Use: Former   • Smokeless Tobacco Use: Never   • Passive Exposure: Not on file          Physical Exam  Constitutional:       Appearance: He is well-developed.   HENT:      Head: Normocephalic and atraumatic.   Eyes:      Extraocular Movements: EOM normal.      Conjunctiva/sclera: Conjunctivae normal.      Pupils: Pupils are equal, round, and reactive to light.   Neck:      Vascular: No carotid bruit.   Neurological:      Mental Status: He is oriented to person, place, and time.      Coordination: Finger-Nose-Finger Test and Heel to Shin Test normal.      Gait: Gait is intact.      Deep Tendon Reflexes:      Reflex Scores:       Tricep reflexes are 2+ on the right side and 2+ on the left side.       Bicep reflexes are 2+ on the right side and " 2+ on the left side.       Brachioradialis reflexes are 2+ on the right side and 2+ on the left side.       Patellar reflexes are 2+ on the right side and 2+ on the left side.       Achilles reflexes are 2+ on the right side and 2+ on the left side.  Psychiatric:         Speech: Speech normal.       Neurologic Exam     Mental Status   Oriented to person, place, and time.   Registration of memory: Good recent and remote memory.   Attention: normal. Concentration: normal.   Speech: speech is normal   Level of consciousness: alert  Knowledge: consistent with education.     Cranial Nerves     CN II   Visual fields full to confrontation.   Visual acuity: normal    CN III, IV, VI   Pupils are equal, round, and reactive to light.  Extraocular motions are normal.     CN V   Facial sensation intact.   Right corneal reflex: normal  Left corneal reflex: normal    CN VII   Facial expression full, symmetric.   Right facial weakness: none  Left facial weakness: none    CN VIII   Hearing: intact    CN IX, X   Palate: symmetric    CN XI   Right sternocleidomastoid strength: normal  Left sternocleidomastoid strength: normal    CN XII   Tongue: not atrophic  Tongue deviation: none    Motor Exam   Muscle bulk: normal  Right arm tone: normal  Left arm tone: normal  Right leg tone: normal  Left leg tone: normal    Strength   Strength 5/5 except as noted.     Sensory Exam   Light touch normal.     Gait, Coordination, and Reflexes     Gait  Gait: normal    Coordination   Finger to nose coordination: normal  Heel to shin coordination: normal    Reflexes   Right brachioradialis: 2+  Left brachioradialis: 2+  Right biceps: 2+  Left biceps: 2+  Right triceps: 2+  Left triceps: 2+  Right patellar: 2+  Left patellar: 2+  Right achilles: 2+  Left achilles: 2+  Right : 2+  Left : 2+          Assessment & Plan   Independent Review of Radiographic Studies:      I personally reviewed the images from the following studies.    I reviewed the  lumbar MRI done in June 2022 which does show multilevel degenerative disc disease with a disc protrusion at L4-L5 and lateral recess stenosis bilaterally at L3-L4 and L4-L5.  Not a lot of central stenosis however.  Agree with the report.        Medical Decision Making:      I still do not think surgery is appropriate at this time given the preponderance of axial pain.  As per his request we will make the referral to Dr. Ho of [facility] to take over his medicines and his injections.  Frankly I think a retrial of the ablation approach is still probably the best given the preponderance of the left-sided axial pain.  I suggested that he speak to his current doctors at [facility] about increasing his gabapentin to 600 mg 3 times daily.  We will see him in 4 months in the event that the sciatic pain gets far worse.  If it does we can consider myelography and surgery but I stressed to him that surgery does best for the sciatic pain, its not quite as helpful for axial pain.    Diagnoses and all orders for this visit:    1. DDD (degenerative disc disease), lumbar (Primary)  -     Ambulatory Referral to Pain Management    2. Bilateral stenosis of lateral recess of lumbar spine  -     Ambulatory Referral to Pain Management    3. Chronic midline low back pain with left-sided sciatica  -     Ambulatory Referral to Pain Management      Return in about 4 months (around 4/20/2023) for face to face.

## 2022-12-20 ENCOUNTER — OFFICE VISIT (OUTPATIENT)
Dept: NEUROSURGERY | Facility: CLINIC | Age: 77
End: 2022-12-20

## 2022-12-20 VITALS
BODY MASS INDEX: 28.06 KG/M2 | SYSTOLIC BLOOD PRESSURE: 150 MMHG | DIASTOLIC BLOOD PRESSURE: 90 MMHG | OXYGEN SATURATION: 99 % | HEART RATE: 82 BPM | HEIGHT: 70 IN | WEIGHT: 196 LBS

## 2022-12-20 DIAGNOSIS — G89.29 CHRONIC MIDLINE LOW BACK PAIN WITH LEFT-SIDED SCIATICA: ICD-10-CM

## 2022-12-20 DIAGNOSIS — M48.061 BILATERAL STENOSIS OF LATERAL RECESS OF LUMBAR SPINE: ICD-10-CM

## 2022-12-20 DIAGNOSIS — M54.42 CHRONIC MIDLINE LOW BACK PAIN WITH LEFT-SIDED SCIATICA: ICD-10-CM

## 2022-12-20 DIAGNOSIS — M51.36 DDD (DEGENERATIVE DISC DISEASE), LUMBAR: Primary | ICD-10-CM

## 2022-12-20 PROCEDURE — 99214 OFFICE O/P EST MOD 30 MIN: CPT | Performed by: NEUROLOGICAL SURGERY

## 2023-02-18 NOTE — PROGRESS NOTES
Subjective   Kali Crespo is a 77 y.o. male.     History of Present Illness     Chief Complaint:   Chief Complaint   Patient presents with   • Anxiety       Kali Crespo 77 y.o. male who presents today for Medical Management of the below listed issues. He  has a problem list of   Patient Active Problem List   Diagnosis   • Cervical pain   • Migraine without aura and without status migrainosus, not intractable   • Vertigo   • Benign essential HTN   • Acid reflux   • Encounter for pre-operative cardiovascular clearance   • Abnormal EKG   • Hyperlipidemia   • Erectile dysfunction   • Prostate cancer ([facility])   • Meniere disease, bilateral   • DDD (degenerative disc disease), lumbar   • Low back pain   • Malignant neoplastic disease ([facility])   • Alternating esotropia   • Diplopia   • Laceration of extensor muscle, fascia and tendon of right ring finger at wrist and hand level, initial encounter   • Acquired hypothyroidism   • Intractable low back pain   • Opioid dependence ([facility])   • Spondylosis 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 myelopathy or radiculopathy   • Lumbar radiculopathy   • Bilateral stenosis of lateral recess of lumbar spine   .  Since the last visit, He has overall felt well.  he has been compliant with   Current Outpatient Medications:   •  diazePAM (VALIUM) 2 MG tablet, Take 1 tablet by mouth 2 (Two) Times a Day As Needed (TAKE ONE EVERY 12 HRS AS NEEDED FOR VERTIGO)., Disp: 60 tablet, Rfl: 2  •  amLODIPine (NORVASC) 5 MG tablet, Take 1 tablet by mouth Daily., Disp: 90 tablet, Rfl: 1  •  EPINEPHrine (EPIPEN) 0.3 MG/0.3ML solution auto-injector injection, ADMINISTER 0.3 ML IN THE MUSCLE 1 TIME FOR 1 DOSE AS DIRECTED BY PRESCRIBER, Disp: , Rfl:   •  fluticasone (FLONASE) 50 MCG/ACT nasal spray, 2 sprays into the nostril(s) as directed by provider Daily., Disp: 48 g, Rfl: 1  •  gabapentin (NEURONTIN) 300 MG capsule, Take 300 mg by mouth 3 (Three) Times a Day., Disp: , Rfl:   •  HYDROcodone-acetaminophen (NORCO)  "7.5-325 MG per tablet, , Disp: , Rfl:   •  irbesartan (AVAPRO) 300 MG tablet, Take 1 tablet by mouth Daily., Disp: 90 tablet, Rfl: 1  •  levothyroxine (SYNTHROID, LEVOTHROID) 25 MCG tablet, Take 1 tablet by mouth Daily., Disp: 90 tablet, Rfl: 1  •  loratadine (CLARITIN) 10 MG tablet, Take 10 mg by mouth daily., Disp: , Rfl:   •  Multiple Vitamins-Minerals (CENTRAL-KEVON FOR SENIORS PO), Take 1 tablet by mouth Daily. HOLD PER MD INSTR, Disp: , Rfl:   •  Omega-3 Fatty Acids (FISH OIL) 1000 MG capsule capsule, Take 1,000 mg by mouth Daily. HOLD PER MD INSTR, Disp: , Rfl:   •  ondansetron (Zofran) 4 MG tablet, Take 1 tablet by mouth Every 8 (Eight) Hours As Needed for Nausea or Vomiting., Disp: 20 tablet, Rfl: 5  •  pantoprazole (PROTONIX) 40 MG EC tablet, Take 1 tablet by mouth Daily., Disp: 90 tablet, Rfl: 1  •  sildenafil (Viagra) 100 MG tablet, Take 1 tablet by mouth Daily As Needed for Erectile Dysfunction., Disp: 10 tablet, Rfl: 11  •  simvastatin (ZOCOR) 20 MG tablet, Take 1 tablet by mouth Every Evening., Disp: 90 tablet, Rfl: 1  •  Thiamine HCl (VITAMIN B-1) 100 MG tablet, Take 500 mg by mouth Daily. HOLD PER MD INSTR, Disp: , Rfl: .  He denies medication side effects.    All of the other chronic condition(s) listed above are stable w/o issues.    /72   Pulse 76   Temp 97.6 °F (36.4 °C) (Oral)   Resp 14   Ht 177.8 cm (70\")   Wt 91.2 kg (201 lb)   BMI 28.84 kg/m²     Results for orders placed or performed in visit on 11/08/22   Comprehensive metabolic panel    Specimen: Blood   Result Value Ref Range    Glucose 88 65 - 99 mg/dL    BUN 16 8 - 23 mg/dL    Creatinine 0.96 0.76 - 1.27 mg/dL    EGFR Result 81.4 >60.0 mL/min/1.73    BUN/Creatinine Ratio 16.7 7.0 - 25.0    Sodium 143 136 - 145 mmol/L    Potassium 4.4 3.5 - 5.2 mmol/L    Chloride 106 98 - 107 mmol/L    Total CO2 29.9 (H) 22.0 - 29.0 mmol/L    Calcium 9.1 8.6 - 10.5 mg/dL    Total Protein 5.8 (L) 6.0 - 8.5 g/dL    Albumin 3.90 3.50 - 5.20 g/dL "    Globulin 1.9 gm/dL    A/G Ratio 2.1 g/dL    Total Bilirubin 0.4 0.0 - 1.2 mg/dL    Alkaline Phosphatase 98 39 - 117 U/L    AST (SGOT) 14 1 - 40 U/L    ALT (SGPT) 15 1 - 41 U/L   Lipid panel    Specimen: Blood   Result Value Ref Range    Total Cholesterol 155 0 - 200 mg/dL    Triglycerides 140 0 - 150 mg/dL    HDL Cholesterol 39 (L) 40 - 60 mg/dL    VLDL Cholesterol Farrukh 25 5 - 40 mg/dL    LDL Chol Calc (NIH) 91 0 - 100 mg/dL   TSH    Specimen: Blood   Result Value Ref Range    TSH 3.010 0.270 - 4.200 uIU/mL   PSA SCREENING    Specimen: Blood   Result Value Ref Range    PSA <0.014 0.000 - 4.000 ng/mL   CBC w MANUAL Differential    Specimen: Blood   Result Value Ref Range    WBC 3.71 3.40 - 10.80 10*3/mm3    RBC 4.28 4.14 - 5.80 10*6/mm3    Hemoglobin 13.2 13.0 - 17.7 g/dL    Hematocrit 38.0 37.5 - 51.0 %    MCV 88.8 79.0 - 97.0 fL    MCH 30.8 26.6 - 33.0 pg    MCHC 34.7 31.5 - 35.7 g/dL    RDW 13.1 12.3 - 15.4 %    Platelets 210 140 - 450 10*3/mm3    Neutrophil Rel % 54.2 42.7 - 76.0 %    Lymphocyte Rel % 30.2 19.6 - 45.3 %    Monocyte Rel % 7.8 5.0 - 12.0 %    Eosinophil Rel % 6.5 (H) 0.3 - 6.2 %    Basophil Rel % 0.8 0.0 - 1.5 %    Neutrophils Absolute 2.01 1.70 - 7.00 10*3/mm3    Lymphocytes Absolute 1.12 0.70 - 3.10 10*3/mm3    Monocytes Absolute 0.29 0.10 - 0.90 10*3/mm3    Eosinophils Absolute 0.24 0.00 - 0.40 10*3/mm3    Basophils Absolute 0.03 0.00 - 0.20 10*3/mm3    Immature Granulocyte Rel % 0.5 0.0 - 0.5 %    Immature Grans Absolute 0.02 0.00 - 0.05 10*3/mm3    nRBC 0.0 0.0 - 0.2 /100 WBC             The following portions of the patient's history were reviewed and updated as appropriate: allergies, current medications, past family history, past medical history, past social history, past surgical history, and problem list.    Review of Systems   Constitutional: Negative for activity change, chills and fever.   Respiratory: Negative for cough.    Cardiovascular: Negative for chest pain.    Psychiatric/Behavioral: Negative for dysphoric mood.       Objective   Physical Exam  Constitutional:       General: He is not in acute distress.     Appearance: He is well-developed.   Cardiovascular:      Rate and Rhythm: Normal rate and regular rhythm.   Pulmonary:      Effort: Pulmonary effort is normal.      Breath sounds: Normal breath sounds.   Neurological:      Mental Status: He is alert and oriented to person, place, and time.   Psychiatric:         Behavior: Behavior normal.         Thought Content: Thought content normal.             Diagnoses and all orders for this visit:    1. Anxiety (Primary)  -     diazePAM (VALIUM) 2 MG tablet; Take 1 tablet by mouth 2 (Two) Times a Day As Needed (TAKE ONE EVERY 12 HRS AS NEEDED FOR VERTIGO).  Dispense: 60 tablet; Refill: 2    2. Erectile dysfunction, unspecified erectile dysfunction type  -     sildenafil (Viagra) 100 MG tablet; Take 1 tablet by mouth Daily As Needed for Erectile Dysfunction.  Dispense: 10 tablet; Refill: 11

## 2023-02-20 ENCOUNTER — OFFICE VISIT (OUTPATIENT)
Dept: FAMILY MEDICINE CLINIC | Facility: CLINIC | Age: 78
End: 2023-02-20
Payer: MEDICARE

## 2023-02-20 VITALS
BODY MASS INDEX: 28.77 KG/M2 | RESPIRATION RATE: 14 BRPM | WEIGHT: 201 LBS | SYSTOLIC BLOOD PRESSURE: 137 MMHG | HEART RATE: 76 BPM | DIASTOLIC BLOOD PRESSURE: 72 MMHG | TEMPERATURE: 97.6 F | HEIGHT: 70 IN

## 2023-02-20 DIAGNOSIS — N52.9 ERECTILE DYSFUNCTION, UNSPECIFIED ERECTILE DYSFUNCTION TYPE: ICD-10-CM

## 2023-02-20 DIAGNOSIS — F41.9 ANXIETY: Primary | Chronic | ICD-10-CM

## 2023-02-20 PROCEDURE — 99214 OFFICE O/P EST MOD 30 MIN: CPT | Performed by: FAMILY MEDICINE

## 2023-02-20 RX ORDER — SILDENAFIL 100 MG/1
100 TABLET, FILM COATED ORAL DAILY PRN
Qty: 10 TABLET | Refills: 11 | Status: SHIPPED | OUTPATIENT
Start: 2023-02-20

## 2023-02-20 RX ORDER — DIAZEPAM 2 MG/1
2 TABLET ORAL 2 TIMES DAILY PRN
Qty: 60 TABLET | Refills: 2 | Status: SHIPPED | OUTPATIENT
Start: 2023-02-20

## 2023-03-28 DIAGNOSIS — I10 BENIGN ESSENTIAL HTN: Chronic | ICD-10-CM

## 2023-03-28 DIAGNOSIS — K21.9 GASTROESOPHAGEAL REFLUX DISEASE WITHOUT ESOPHAGITIS: Chronic | ICD-10-CM

## 2023-03-28 DIAGNOSIS — E78.2 MIXED HYPERLIPIDEMIA: Chronic | ICD-10-CM

## 2023-03-29 RX ORDER — PANTOPRAZOLE SODIUM 40 MG/1
40 TABLET, DELAYED RELEASE ORAL DAILY
Qty: 90 TABLET | Refills: 3 | OUTPATIENT
Start: 2023-03-29

## 2023-03-29 RX ORDER — IRBESARTAN 300 MG/1
300 TABLET ORAL DAILY
Qty: 90 TABLET | Refills: 3 | OUTPATIENT
Start: 2023-03-29

## 2023-03-29 RX ORDER — SIMVASTATIN 20 MG
TABLET ORAL
Qty: 90 TABLET | Refills: 3 | OUTPATIENT
Start: 2023-03-29

## 2023-03-29 RX ORDER — AMLODIPINE BESYLATE 5 MG/1
5 TABLET ORAL DAILY
Qty: 90 TABLET | Refills: 3 | OUTPATIENT
Start: 2023-03-29

## 2023-04-20 ENCOUNTER — HOSPITAL ENCOUNTER (EMERGENCY)
Facility: HOSPITAL | Age: 78
Discharge: HOME | End: 2023-04-20
Attending: EMERGENCY MEDICINE
Payer: MEDICARE

## 2023-04-20 VITALS
BODY MASS INDEX: 27.06 KG/M2 | HEART RATE: 70 BPM | DIASTOLIC BLOOD PRESSURE: 73 MMHG | RESPIRATION RATE: 12 BRPM | TEMPERATURE: 98.2 F | SYSTOLIC BLOOD PRESSURE: 123 MMHG | OXYGEN SATURATION: 99 % | WEIGHT: 189 LBS | HEIGHT: 70 IN

## 2023-04-20 DIAGNOSIS — R00.1 BRADYCARDIA: Primary | ICD-10-CM

## 2023-04-20 LAB
ANION GAP SERPL CALCULATED.3IONS-SCNC: 8.1 MMOL/L (ref 5–15)
BASOPHILS # BLD AUTO: 0.01 10*3/MM3 (ref 0–0.2)
BASOPHILS NFR BLD AUTO: 0.1 % (ref 0–1.5)
BUN SERPL-MCNC: 20 MG/DL (ref 8–23)
BUN/CREAT SERPL: 20.4 (ref 7–25)
CALCIUM SPEC-SCNC: 9.3 MG/DL (ref 8.6–10.5)
CHLORIDE SERPL-SCNC: 104 MMOL/L (ref 98–107)
CO2 SERPL-SCNC: 26.9 MMOL/L (ref 22–29)
CREAT SERPL-MCNC: 0.98 MG/DL (ref 0.76–1.27)
DEPRECATED RDW RBC AUTO: 41.9 FL (ref 37–54)
EGFRCR SERPLBLD CKD-EPI 2021: 79.4 ML/MIN/1.73
EOSINOPHIL # BLD AUTO: 0.02 10*3/MM3 (ref 0–0.4)
EOSINOPHIL NFR BLD AUTO: 0.2 % (ref 0.3–6.2)
ERYTHROCYTE [DISTWIDTH] IN BLOOD BY AUTOMATED COUNT: 12.5 % (ref 12.3–15.4)
GLUCOSE SERPL-MCNC: 111 MG/DL (ref 65–99)
HCT VFR BLD AUTO: 37.9 % (ref 37.5–51)
HGB BLD-MCNC: 13.3 G/DL (ref 13–17.7)
IMM GRANULOCYTES # BLD AUTO: 0.03 10*3/MM3 (ref 0–0.05)
IMM GRANULOCYTES NFR BLD AUTO: 0.2 % (ref 0–0.5)
LYMPHOCYTES # BLD AUTO: 1.26 10*3/MM3 (ref 0.7–3.1)
LYMPHOCYTES NFR BLD AUTO: 9.7 % (ref 19.6–45.3)
MCH RBC QN AUTO: 32 PG (ref 26.6–33)
MCHC RBC AUTO-ENTMCNC: 35.1 G/DL (ref 31.5–35.7)
MCV RBC AUTO: 91.1 FL (ref 79–97)
MONOCYTES # BLD AUTO: 0.78 10*3/MM3 (ref 0.1–0.9)
MONOCYTES NFR BLD AUTO: 6 % (ref 5–12)
NEUTROPHILS NFR BLD AUTO: 10.88 10*3/MM3 (ref 1.7–7)
NEUTROPHILS NFR BLD AUTO: 83.8 % (ref 42.7–76)
PLATELET # BLD AUTO: 218 10*3/MM3 (ref 140–450)
PMV BLD AUTO: 9.8 FL (ref 6–12)
POTASSIUM SERPL-SCNC: 4.3 MMOL/L (ref 3.5–5.2)
QT INTERVAL: 391 MS
RBC # BLD AUTO: 4.16 10*6/MM3 (ref 4.14–5.8)
SODIUM SERPL-SCNC: 139 MMOL/L (ref 136–145)
WBC NRBC COR # BLD: 12.98 10*3/MM3 (ref 3.4–10.8)

## 2023-04-20 PROCEDURE — 80048 BASIC METABOLIC PNL TOTAL CA: CPT | Performed by: EMERGENCY MEDICINE

## 2023-04-20 PROCEDURE — 99283 EMERGENCY DEPT VISIT LOW MDM: CPT

## 2023-04-20 PROCEDURE — 85025 COMPLETE CBC W/AUTO DIFF WBC: CPT | Performed by: EMERGENCY MEDICINE

## 2023-04-20 PROCEDURE — 93010 ELECTROCARDIOGRAM REPORT: CPT | Performed by: INTERNAL MEDICINE

## 2023-04-20 PROCEDURE — 36415 COLL VENOUS BLD VENIPUNCTURE: CPT

## 2023-04-20 PROCEDURE — 93005 ELECTROCARDIOGRAM TRACING: CPT | Performed by: EMERGENCY MEDICINE

## 2023-04-20 NOTE — FSED PROVIDER NOTE
Subjective   History of Present Illness  Patient presents today with a slow heart rate, he had a procedure done yesterday and was told that his heart rate was abnormal, and that he need to follow-up with a cardiologist, this morning he checked its heart rate at home and he said it was in the 40s.  He was having no symptoms whatsoever at that time.  No chest pain no shortness of breath no nausea vomiting, he comes in today because he was concerned since he checked his heart rate at home and it seems low.  Currently no symptoms no chest pain        Review of Systems   Constitutional: Negative for activity change, appetite change, chills, fatigue and fever.   HENT: Negative for congestion, dental problem, drooling and ear discharge.    Eyes: Negative for pain, discharge and itching.   Respiratory: Negative for apnea, cough, choking and chest tightness.    Cardiovascular: Negative for chest pain and leg swelling.   Gastrointestinal: Negative for abdominal distention, abdominal pain, anal bleeding, blood in stool, constipation and diarrhea.   Endocrine: Negative for cold intolerance, heat intolerance and polydipsia.   Genitourinary: Negative for difficulty urinating, dysuria, enuresis and flank pain.   Musculoskeletal: Negative for arthralgias, back pain, gait problem and joint swelling.   Skin: Negative for color change, pallor and rash.   Allergic/Immunologic: Negative for environmental allergies and food allergies.   Neurological: Negative for dizziness, facial asymmetry, light-headedness and headaches.   Hematological: Negative for adenopathy. Does not bruise/bleed easily.   Psychiatric/Behavioral: Negative for agitation, behavioral problems, confusion and decreased concentration.   All other systems reviewed and are negative.      Past Medical History:   Diagnosis Date   • Anxiety    • DDD (degenerative disc disease), cervical    • DDD (degenerative disc disease), lumbosacral    • Erectile dysfunction    • GERD  (gastroesophageal reflux disease)    • H/O complete eye exam 2017   • Headache    • Hyperlipidemia    • Hypertension    • Hyperthyroidism    • Low back pain    • Measles    • Meniere disease    • Mumps    • Prostate cancer    • Spondylosis 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 myelopathy or radiculopathy 2022       Allergies   Allergen Reactions   • Amoxicillin Unknown - Low Severity   • Astemizole Unknown - Low Severity   • Codeine Sulfate    • Penicillins    • Phenylephrine Unknown - Low Severity   • Pyrilamine Unknown - Low Severity   • Sulfa Antibiotics Nausea And Vomiting       Past Surgical History:   Procedure Laterality Date   • CATARACT EXTRACTION     • COLONOSCOPY     • EPIDURAL BLOCK  22   • EYE SURGERY     • KNEE SURGERY     • PROSTATE SURGERY     • SINUS SURGERY     • TONSILLECTOMY     • TRIGGER FINGER RELEASE Left 2021    Procedure: LEFT RING FINGER EXTENSOR TENDON REPAIR;  Surgeon: David Leonard MD;  Location: [facility];  Service: Orthopedics;  Laterality: Left;       Family History   Problem Relation Age of Onset   • Heart disease Mother    • Stroke Mother    • Hyperlipidemia Mother    • Cancer Father         prostate   • Arthritis Father    • Hypertension Father    • Migraines Sister    • Malig Hyperthermia Neg Hx        Social History     Socioeconomic History   • Marital status:    Tobacco Use   • Smoking status: Former     Packs/day: 0.00     Years: 4.00     Pack years: 0.00     Types: Cigarettes     Start date: 1965     Quit date: 1969     Years since quittin.3   • Smokeless tobacco: Never   • Tobacco comments:     40 years ago   Vaping Use   • Vaping Use: Never used   Substance and Sexual Activity   • Alcohol use: No     Comment: seldom   • Drug use: No   • Sexual activity: Not Currently     Partners: Female     Birth control/protection: Surgical           Objective   Physical Exam  Vitals and nursing note reviewed.   Constitutional:       General: He is  not in acute distress.     Appearance: Normal appearance.   HENT:      Head: Normocephalic and atraumatic.      Nose: Nose normal.      Mouth/Throat:      Mouth: Mucous membranes are moist.   Eyes:      Extraocular Movements: Extraocular movements intact.      Pupils: Pupils are equal, round, and reactive to light.   Cardiovascular:      Rate and Rhythm: Normal rate and regular rhythm.      Pulses: Normal pulses.      Heart sounds: Normal heart sounds. No murmur heard.    No friction rub. No gallop.   Pulmonary:      Effort: Pulmonary effort is normal.      Breath sounds: Normal breath sounds.   Abdominal:      General: Abdomen is flat.      Palpations: Abdomen is soft.   Musculoskeletal:      Cervical back: Normal range of motion and neck supple.   Skin:     General: Skin is warm and dry.   Neurological:      General: No focal deficit present.      Mental Status: He is alert and oriented to person, place, and time.   Psychiatric:         Mood and Affect: Mood normal.         Behavior: Behavior normal.         Procedures           ED Course                                           Medical Decision Making  Patient with a history of bradycardia that has resolved, currently no symptoms, will check basic electrolytes and EKG which is interpreted by myself, I will place him on the monitor and watch him    Amount and/or Complexity of Data Reviewed  ECG/medicine tests: ordered and independent interpretation performed.          Final diagnoses:   Bradycardia       ED Disposition  ED Disposition     ED Disposition   Discharge    Condition   Stable    Comment   --             No follow-up provider specified.       Medication List      No changes were made to your prescriptions during this visit.

## 2023-05-15 NOTE — PROGRESS NOTES
The ABCs of the Annual Wellness Visit  Subsequent Medicare Wellness Visit    Subjective    Kali Crespo is a 77 y.o. male who presents for a Subsequent Medicare Wellness Visit.    The following portions of the patient's history were reviewed and   updated as appropriate: allergies, current medications, past family history, past medical history, past social history, past surgical history and problem list.    Compared to one year ago, the patient feels his physical   health is the same.    Compared to one year ago, the patient feels his mental   health is the same.    Recent Hospitalizations:  This patient has had a [facility] admission record on file within the last 365 days.    Current Medical Providers:  Patient Care Team:  Nathan Arango MD as PCP - General  Nathan Arango MD as PCP - Family Medicine  Nena Ramey APRN (Dermatology)  Siddharth Ho MD as Consulting Physician (Pain Medicine)  Wolf Diaz MD as Surgeon (Neurosurgery)    Outpatient Medications Prior to Visit   Medication Sig Dispense Refill   • EPINEPHrine (EPIPEN) 0.3 MG/0.3ML solution auto-injector injection ADMINISTER 0.3 ML IN THE MUSCLE 1 TIME FOR 1 DOSE AS DIRECTED BY PRESCRIBER     • gabapentin (NEURONTIN) 300 MG capsule Take 300 mg by mouth 3 (Three) Times a Day.     • HYDROcodone-acetaminophen (NORCO) 7.5-325 MG per tablet      • loratadine (CLARITIN) 10 MG tablet Take 10 mg by mouth daily.     • Multiple Vitamins-Minerals (CENTRAL-KEVON FOR SENIORS PO) Take 1 tablet by mouth Daily. HOLD PER MD INSTR     • Omega-3 Fatty Acids (FISH OIL) 1000 MG capsule capsule Take 1,000 mg by mouth Daily. HOLD PER MD INSTR     • sildenafil (Viagra) 100 MG tablet Take 1 tablet by mouth Daily As Needed for Erectile Dysfunction. 10 tablet 11   • Thiamine HCl (VITAMIN B-1) 100 MG tablet Take 500 mg by mouth Daily. HOLD PER MD INSTR     • amLODIPine (NORVASC) 5 MG tablet Take 1 tablet by mouth Daily. 90 tablet 1   • diazePAM  (VALIUM) 2 MG tablet Take 1 tablet by mouth 2 (Two) Times a Day As Needed (TAKE ONE EVERY 12 HRS AS NEEDED FOR VERTIGO). 60 tablet 2   • fluticasone (FLONASE) 50 MCG/ACT nasal spray 2 sprays into the nostril(s) as directed by provider Daily. 48 g 1   • irbesartan (AVAPRO) 300 MG tablet Take 1 tablet by mouth Daily. 90 tablet 1   • levothyroxine (SYNTHROID, LEVOTHROID) 25 MCG tablet Take 1 tablet by mouth Daily. 90 tablet 1   • ondansetron (Zofran) 4 MG tablet Take 1 tablet by mouth Every 8 (Eight) Hours As Needed for Nausea or Vomiting. 20 tablet 5   • pantoprazole (PROTONIX) 40 MG EC tablet Take 1 tablet by mouth Daily. 90 tablet 1   • simvastatin (ZOCOR) 20 MG tablet Take 1 tablet by mouth Every Evening. 90 tablet 1     No facility-administered medications prior to visit.       Opioid medication/s are on active medication list.  and I have evaluated his active treatment plan and pain score trends (see table).  Vitals:    05/15/23 1316   PainSc: 0-No pain     I have reviewed the chart for potential of high risk medication and harmful drug interactions in the elderly.            Aspirin is not on active medication list.  Aspirin use is not indicated based on review of current medical condition/s. Risk of harm outweighs potential benefits.  .    Patient Active Problem List   Diagnosis   • Cervical pain   • Migraine without aura and without status migrainosus, not intractable   • Vertigo   • Benign essential HTN   • Acid reflux   • Encounter for pre-operative cardiovascular clearance   • Abnormal EKG   • Hyperlipidemia   • Erectile dysfunction   • Prostate cancer   • Meniere disease, bilateral   • DDD (degenerative disc disease), lumbar   • Low back pain   • Malignant neoplastic disease   • Alternating esotropia   • Diplopia   • Laceration of extensor muscle, fascia and tendon of right ring finger at wrist and hand level, initial encounter   • Acquired hypothyroidism   • Intractable low back pain   • Opioid dependence  "  • Spondylosis 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 myelopathy or radiculopathy   • Lumbar radiculopathy   • Bilateral stenosis of lateral recess of lumbar spine     Advance Care Planning   Advance Care Planning     Advance Directive is on file.  ACP discussion was held with the patient during this visit. Patient has an advance directive in EMR which is still valid.      Objective    Vitals:    05/15/23 1316   BP: 146/73   Pulse: 58   Resp: 14   Temp: 97.9 °F (36.6 °C)   TempSrc: Oral   SpO2: 98%   Weight: 93.9 kg (207 lb)   Height: 177.8 cm (70\")   PainSc: 0-No pain     Estimated body mass index is 29.7 kg/m² as calculated from the following:    Height as of this encounter: 177.8 cm (70\").    Weight as of this encounter: 93.9 kg (207 lb).    BMI is >= 25 and <30. (Overweight) The following options were offered after discussion;: exercise counseling/recommendations and nutrition counseling/recommendations      Does the patient have evidence of cognitive impairment? No    Lab Results   Component Value Date    CHLPL 204 (H) 2023    TRIG 123 2023    HDL 45 2023     (H) 2023    VLDL 22 2023        HEALTH RISK ASSESSMENT    Smoking Status:  Social History     Tobacco Use   Smoking Status Former   • Packs/day: 0.00   • Years: 4.00   • Pack years: 0.00   • Types: Cigarettes   • Start date: 1965   • Quit date: 1969   • Years since quittin.4   Smokeless Tobacco Never   Tobacco Comments    40 years ago     Alcohol Consumption:  Social History     Substance and Sexual Activity   Alcohol Use No    Comment: seldom     Fall Risk Screen:    STEADI Fall Risk Assessment has not been completed.    Depression Screenin/16/2023     9:50 AM   PHQ-2/PHQ-9 Depression Screening   Little Interest or Pleasure in Doing Things 0-->not at all   Feeling Down, Depressed or Hopeless 0-->not at all   PHQ-9: Brief Depression Severity Measure Score 0       Health Habits and Functional and Cognitive " Screenin/16/2023     9:00 AM   Functional & Cognitive Status   Do you have difficulty preparing food and eating? No   Do you have difficulty bathing yourself, getting dressed or grooming yourself? No   Do you have difficulty using the toilet? No   Do you have difficulty moving around from place to place? No   Do you have trouble with steps or getting out of a bed or a chair? No   Current Diet Well Balanced Diet   Dental Exam Up to date   Eye Exam Not up to date   Exercise (times per week) 3 times per week   Current Exercises Include Walking   Do you need help using the phone?  No   Are you deaf or do you have serious difficulty hearing?  No   Do you need help with transportation? No   Do you need help shopping? No   Do you need help preparing meals?  No   Do you need help with housework?  No   Do you need help with laundry? No   Do you need help taking your medications? No   Do you need help managing money? No   Do you ever drive or ride in a car without wearing a seat belt? No   Have you felt unusual stress, anger or loneliness in the last month? No   Who do you live with? Spouse   If you need help, do you have trouble finding someone available to you? Yes   Have you been bothered in the last four weeks by sexual problems? No   Do you have difficulty concentrating, remembering or making decisions? No       Age-appropriate Screening Schedule:  Refer to the list below for future screening recommendations based on patient's age, sex and/or medical conditions. Orders for these recommended tests are listed in the plan section. The patient has been provided with a written plan.    Health Maintenance   Topic Date Due   • COVID-19 Vaccine (6 - Booster for Moderna series) 2023 (Originally 2022)   • INFLUENZA VACCINE  2023   • PROSTATE CANCER SCREENING  2023   • LIPID PANEL  2024   • ANNUAL WELLNESS VISIT  2024   • COLORECTAL CANCER SCREENING  2025   • Pneumococcal Vaccine 65+  " Completed   • ZOSTER VACCINE  Completed   • HEPATITIS C SCREENING  Discontinued   • TDAP/TD VACCINES  Discontinued                  CMS Preventative Services Quick Reference  Risk Factors Identified During Encounter  None Identified  The above risks/problems have been discussed with the patient.  Pertinent information has been shared with the patient in the After Visit Summary.  An After Visit Summary and PPPS were made available to the patient.    Follow Up:   Next Medicare Wellness visit to be scheduled in 1 year.       Additional E&M Note during same encounter follows:  Patient has multiple medical problems which are significant and separately identifiable that require additional work above and beyond the Medicare Wellness Visit.      Chief Complaint  Hypertension, Hyperlipidemia (Med refill due ), Hypothyroidism, Anxiety, Heartburn, and medicare wellness (Due )    Subjective        HPI  Kali Crespo is also being seen today for Medication Management.    Pt doing well on the medication(s) w/o SEs, and is due refill today.      Review of Systems   Constitutional: Negative for chills and fever.   HENT: Negative for congestion, ear pain and sinus pressure.    Eyes: Negative for pain and visual disturbance.   Respiratory: Negative for cough and shortness of breath.    Cardiovascular: Negative for chest pain.   Gastrointestinal: Negative for abdominal pain.   Genitourinary: Negative for difficulty urinating and dysuria.   Skin: Negative.    Neurological: Negative.    Psychiatric/Behavioral: Negative for dysphoric mood.       Objective   Vital Signs:  /73   Pulse 58   Temp 97.9 °F (36.6 °C) (Oral)   Resp 14   Ht 177.8 cm (70\")   Wt 93.9 kg (207 lb)   SpO2 98%   BMI 29.70 kg/m²     Physical Exam  Constitutional:       General: He is not in acute distress.     Appearance: He is well-developed.   Cardiovascular:      Rate and Rhythm: Normal rate and regular rhythm.   Pulmonary:      Effort: Pulmonary " effort is normal.      Breath sounds: Normal breath sounds.   Neurological:      Mental Status: He is alert and oriented to person, place, and time.   Psychiatric:         Behavior: Behavior normal.         Thought Content: Thought content normal.      Labs reviewed with pt today during visit. All questions answered.      The following data was reviewed by: Nathan Arnago MD on 05/16/2023:  Common labs        11/9/2022    08:50 4/20/2023    15:19 5/9/2023    10:50   Common Labs   Glucose 88   111   87     BUN 16   20   15     Creatinine 0.96   0.98   0.98     Sodium 143   139   143     Potassium 4.4   4.3   4.6     Chloride 106   104   102     Calcium 9.1   9.3   9.3     Total Protein 5.8       Albumin 3.90       Total Bilirubin 0.4       Alkaline Phosphatase 98       AST (SGOT) 14       ALT (SGPT) 15       WBC 3.71   12.98      Hemoglobin 13.2   13.3      Hematocrit 38.0   37.9      Platelets 210   218      Total Cholesterol 155    204     Triglycerides 140    123     HDL Cholesterol 39    45     LDL Cholesterol  91    137     PSA <0.014                    Assessment and Plan   Diagnoses and all orders for this visit:    1. Medicare annual wellness visit, subsequent (Primary)    2. Benign essential HTN  -     amLODIPine (NORVASC) 5 MG tablet; Take 1 tablet by mouth Daily.  Dispense: 90 tablet; Refill: 1  -     irbesartan (AVAPRO) 300 MG tablet; Take 1 tablet by mouth Daily.  Dispense: 90 tablet; Refill: 1  -     Comprehensive metabolic panel; Future  -     Lipid panel; Future  -     CBC and Differential; Future    3. Anxiety  -     diazePAM (VALIUM) 2 MG tablet; Take 1 tablet by mouth 2 (Two) Times a Day As Needed (TAKE ONE EVERY 12 HRS AS NEEDED FOR VERTIGO).  Dispense: 60 tablet; Refill: 2    4. Chronic seasonal allergic rhinitis due to pollen  -     fluticasone (FLONASE) 50 MCG/ACT nasal spray; 2 sprays into the nostril(s) as directed by provider Daily.  Dispense: 48 g; Refill: 1    5. Acquired hypothyroidism  -      levothyroxine (SYNTHROID, LEVOTHROID) 25 MCG tablet; Take 1 tablet by mouth Daily.  Dispense: 90 tablet; Refill: 1  -     TSH; Future  -     T4, Free; Future    6. Gastroesophageal reflux disease without esophagitis  -     pantoprazole (PROTONIX) 40 MG EC tablet; Take 1 tablet by mouth Daily.  Dispense: 90 tablet; Refill: 1    7. Mixed hyperlipidemia  -     simvastatin (ZOCOR) 20 MG tablet; Take 1 tablet by mouth Every Evening.  Dispense: 90 tablet; Refill: 1  -     Lipid panel; Future    8. Nausea  -     ondansetron (Zofran) 4 MG tablet; Take 1 tablet by mouth Every 8 (Eight) Hours As Needed for Nausea or Vomiting.  Dispense: 20 tablet; Refill: 5    9. Prostate cancer  -     PSA DIAGNOSTIC; Future           I spent 15 minutes caring for Kali on this date of service. This time includes time spent by me in the following activities:preparing for the visit, reviewing tests, performing a medically appropriate examination and/or evaluation , ordering medications, tests, or procedures and documenting information in the medical record  Follow Up   Return in about 6 months (around 11/16/2023) for Recheck.  Patient was given instructions and counseling regarding his condition or for health maintenance advice. Please see specific information pulled into the AVS if appropriate.

## 2023-05-16 ENCOUNTER — OFFICE VISIT (OUTPATIENT)
Dept: FAMILY MEDICINE CLINIC | Facility: CLINIC | Age: 78
End: 2023-05-16
Payer: MEDICARE

## 2023-05-16 VITALS
OXYGEN SATURATION: 98 % | HEIGHT: 70 IN | DIASTOLIC BLOOD PRESSURE: 73 MMHG | RESPIRATION RATE: 14 BRPM | BODY MASS INDEX: 29.63 KG/M2 | WEIGHT: 207 LBS | HEART RATE: 58 BPM | TEMPERATURE: 97.9 F | SYSTOLIC BLOOD PRESSURE: 146 MMHG

## 2023-05-16 DIAGNOSIS — I10 BENIGN ESSENTIAL HTN: Chronic | ICD-10-CM

## 2023-05-16 DIAGNOSIS — F41.9 ANXIETY: Chronic | ICD-10-CM

## 2023-05-16 DIAGNOSIS — K21.9 GASTROESOPHAGEAL REFLUX DISEASE WITHOUT ESOPHAGITIS: Chronic | ICD-10-CM

## 2023-05-16 DIAGNOSIS — E03.9 ACQUIRED HYPOTHYROIDISM: Chronic | ICD-10-CM

## 2023-05-16 DIAGNOSIS — E78.2 MIXED HYPERLIPIDEMIA: Chronic | ICD-10-CM

## 2023-05-16 DIAGNOSIS — J30.1 CHRONIC SEASONAL ALLERGIC RHINITIS DUE TO POLLEN: Chronic | ICD-10-CM

## 2023-05-16 DIAGNOSIS — R11.0 NAUSEA: ICD-10-CM

## 2023-05-16 DIAGNOSIS — Z00.00 MEDICARE ANNUAL WELLNESS VISIT, SUBSEQUENT: Primary | ICD-10-CM

## 2023-05-16 DIAGNOSIS — C61 PROSTATE CANCER: ICD-10-CM

## 2023-05-16 RX ORDER — ONDANSETRON 4 MG/1
4 TABLET, FILM COATED ORAL EVERY 8 HOURS PRN
Qty: 20 TABLET | Refills: 5 | Status: SHIPPED | OUTPATIENT
Start: 2023-05-16

## 2023-05-16 RX ORDER — DIAZEPAM 2 MG/1
2 TABLET ORAL 2 TIMES DAILY PRN
Qty: 60 TABLET | Refills: 2 | Status: SHIPPED | OUTPATIENT
Start: 2023-05-16

## 2023-05-16 RX ORDER — PANTOPRAZOLE SODIUM 40 MG/1
40 TABLET, DELAYED RELEASE ORAL DAILY
Qty: 90 TABLET | Refills: 1 | Status: SHIPPED | OUTPATIENT
Start: 2023-05-16

## 2023-05-16 RX ORDER — IRBESARTAN 300 MG/1
300 TABLET ORAL DAILY
Qty: 90 TABLET | Refills: 1 | Status: SHIPPED | OUTPATIENT
Start: 2023-05-16

## 2023-05-16 RX ORDER — LEVOTHYROXINE SODIUM 0.03 MG/1
25 TABLET ORAL DAILY
Qty: 90 TABLET | Refills: 1 | Status: SHIPPED | OUTPATIENT
Start: 2023-05-16

## 2023-05-16 RX ORDER — SIMVASTATIN 20 MG
20 TABLET ORAL EVERY EVENING
Qty: 90 TABLET | Refills: 1 | Status: SHIPPED | OUTPATIENT
Start: 2023-05-16

## 2023-05-16 RX ORDER — AMLODIPINE BESYLATE 5 MG/1
5 TABLET ORAL DAILY
Qty: 90 TABLET | Refills: 1 | Status: SHIPPED | OUTPATIENT
Start: 2023-05-16

## 2023-05-16 RX ORDER — FLUTICASONE PROPIONATE 50 MCG
2 SPRAY, SUSPENSION (ML) NASAL DAILY
Qty: 48 G | Refills: 1 | Status: SHIPPED | OUTPATIENT
Start: 2023-05-16

## 2023-05-16 NOTE — PATIENT INSTRUCTIONS
Medicare Wellness  Personal Prevention Plan of Service     Date of Office Visit:    Encounter Provider:  Nathan Arango MD  Place of Service:  [facility]  Patient Name: Kali Crespo  :  1945    As part of the Medicare Wellness portion of your visit today, we are providing you with this personalized preventive plan of services (PPPS). This plan is based upon recommendations of the [place] Preventive Services Task Force (USPSTF) and the Advisory Committee on Immunization Practices (ACIP).    This lists the preventive care services that should be considered, and provides dates of when you are due. Items listed as completed are up-to-date and do not require any further intervention.    Health Maintenance   Topic Date Due    COVID-19 Vaccine (6 - Booster for Moderna series) 2023 (Originally 2022)    INFLUENZA VACCINE  2023    PROSTATE CANCER SCREENING  2023    LIPID PANEL  2024    ANNUAL WELLNESS VISIT  2024    COLORECTAL CANCER SCREENING  2025    Pneumococcal Vaccine 65+  Completed    ZOSTER VACCINE  Completed    HEPATITIS C SCREENING  Discontinued    TDAP/TD VACCINES  Discontinued       No orders of the defined types were placed in this encounter.      Return in about 6 months (around 2023) for Recheck.

## 2023-05-22 NOTE — PROGRESS NOTES
Subjective   Patient ID: Kali Crespo is a 77 y.o. male is here today for follow-up after pain medicine     Its been about 5 months since I saw him.  His pain management care has been transferred to  at [facility].  Apparently he got a left a sacroiliac joint injection and it helped him.  He gets his gabapentin and his hydrocodone from Dr. Ho.  Says he feels quite well and he can get around and do things.  At this point we can keep it open-ended.  He did have severe sciatica in the past but not now.  If that reoccurs in the future, he can certainly come back to be reevaluated.        History of Present Illness    The following portions of the patient's history were reviewed and updated as appropriate: allergies, current medications, past family history, past medical history, past social history, past surgical history and problem list.    Review of Systems   Constitutional: Negative for fever.   Musculoskeletal: Positive for back pain.   Neurological: Negative for tremors, weakness and numbness.   All other systems reviewed and are negative.          Objective     There were no vitals filed for this visit.  There is no height or weight on file to calculate BMI.    Tobacco Use: Medium Risk   • Smoking Tobacco Use: Former   • Smokeless Tobacco Use: Never   • Passive Exposure: Not on file          Physical Exam  Constitutional:       Appearance: He is well-developed.   HENT:      Head: Normocephalic and atraumatic.   Eyes:      Extraocular Movements: EOM normal.      Conjunctiva/sclera: Conjunctivae normal.      Pupils: Pupils are equal, round, and reactive to light.   Neck:      Vascular: No carotid bruit.   Neurological:      Mental Status: He is oriented to person, place, and time.      Coordination: Finger-Nose-Finger Test and Heel to Shin Test normal.      Gait: Gait is intact.      Deep Tendon Reflexes:      Reflex Scores:       Tricep reflexes are 2+ on the right side and 2+ on the left side.        Bicep reflexes are 2+ on the right side and 2+ on the left side.       Brachioradialis reflexes are 2+ on the right side and 2+ on the left side.       Patellar reflexes are 2+ on the right side and 2+ on the left side.       Achilles reflexes are 2+ on the right side and 2+ on the left side.  Psychiatric:         Speech: Speech normal.       Neurologic Exam     Mental Status   Oriented to person, place, and time.   Registration of memory: Good recent and remote memory.   Attention: normal. Concentration: normal.   Speech: speech is normal   Level of consciousness: alert  Knowledge: consistent with education.     Cranial Nerves     CN II   Visual fields full to confrontation.   Visual acuity: normal    CN III, IV, VI   Pupils are equal, round, and reactive to light.  Extraocular motions are normal.     CN V   Facial sensation intact.   Right corneal reflex: normal  Left corneal reflex: normal    CN VII   Facial expression full, symmetric.   Right facial weakness: none  Left facial weakness: none    CN VIII   Hearing: intact    CN IX, X   Palate: symmetric    CN XI   Right sternocleidomastoid strength: normal  Left sternocleidomastoid strength: normal    CN XII   Tongue: not atrophic  Tongue deviation: none    Motor Exam   Muscle bulk: normal  Right arm tone: normal  Left arm tone: normal  Right leg tone: normal  Left leg tone: normal    Strength   Strength 5/5 except as noted.     Sensory Exam   Light touch normal.     Gait, Coordination, and Reflexes     Gait  Gait: normal    Coordination   Finger to nose coordination: normal  Heel to shin coordination: normal    Reflexes   Right brachioradialis: 2+  Left brachioradialis: 2+  Right biceps: 2+  Left biceps: 2+  Right triceps: 2+  Left triceps: 2+  Right patellar: 2+  Left patellar: 2+  Right achilles: 2+  Left achilles: 2+  Right : 2+  Left : 2+          Assessment & Plan   Independent Review of Radiographic Studies:      I personally reviewed the images  from the following studies.    I reviewed the lumbar MRI done in June 2022 which does show multilevel degenerative disc disease with a disc protrusion at L4-L5 and lateral recess stenosis bilaterally at L3-L4 and L4-L5.  Not a lot of central stenosis however.  Agree with the report.    Medical Decision Making:      At this point, we can keep it open-ended.  He will continue working with Dr. Ho who is doing injections and taking care of all of his pain medications.  If his sciatica comes back in any significant way, I will be happy to reevaluate him.    There are no diagnoses linked to this encounter.  No follow-ups on file.

## 2023-05-24 ENCOUNTER — OFFICE VISIT (OUTPATIENT)
Dept: NEUROSURGERY | Facility: CLINIC | Age: 78
End: 2023-05-24
Payer: MEDICARE

## 2023-05-24 VITALS
BODY MASS INDEX: 29.63 KG/M2 | HEART RATE: 79 BPM | OXYGEN SATURATION: 97 % | RESPIRATION RATE: 20 BRPM | SYSTOLIC BLOOD PRESSURE: 124 MMHG | DIASTOLIC BLOOD PRESSURE: 72 MMHG | WEIGHT: 207 LBS | HEIGHT: 70 IN

## 2023-05-24 DIAGNOSIS — M51.36 DDD (DEGENERATIVE DISC DISEASE), LUMBAR: Primary | ICD-10-CM

## 2023-05-24 DIAGNOSIS — M54.42 CHRONIC MIDLINE LOW BACK PAIN WITH LEFT-SIDED SCIATICA: ICD-10-CM

## 2023-05-24 DIAGNOSIS — G89.29 CHRONIC MIDLINE LOW BACK PAIN WITH LEFT-SIDED SCIATICA: ICD-10-CM

## 2023-05-24 DIAGNOSIS — M48.061 BILATERAL STENOSIS OF LATERAL RECESS OF LUMBAR SPINE: ICD-10-CM

## 2023-05-24 PROCEDURE — 3078F DIAST BP <80 MM HG: CPT | Performed by: NEUROLOGICAL SURGERY

## 2023-05-24 PROCEDURE — 1160F RVW MEDS BY RX/DR IN RCRD: CPT | Performed by: NEUROLOGICAL SURGERY

## 2023-05-24 PROCEDURE — 1159F MED LIST DOCD IN RCRD: CPT | Performed by: NEUROLOGICAL SURGERY

## 2023-05-24 PROCEDURE — 99213 OFFICE O/P EST LOW 20 MIN: CPT | Performed by: NEUROLOGICAL SURGERY

## 2023-05-24 PROCEDURE — 3074F SYST BP LT 130 MM HG: CPT | Performed by: NEUROLOGICAL SURGERY

## 2023-08-22 NOTE — PROGRESS NOTES
Chief Complaint:   Chief Complaint   Patient presents with    Anxiety     Med refill due  / CSA updated today DEV       Kali Crespo 78 y.o. male who presents today for Medical Management of the below listed issues. He  has a problem list of   Patient Active Problem List   Diagnosis    Cervical pain    Migraine without aura and without status migrainosus, not intractable    Vertigo    Benign essential HTN    Acid reflux    Encounter for pre-operative cardiovascular clearance    Abnormal EKG    Hyperlipidemia    Erectile dysfunction    Prostate cancer    Meniere disease, bilateral    DDD (degenerative disc disease), lumbar    Chronic midline low back pain with left-sided sciatica    Malignant neoplastic disease    Alternating esotropia    Diplopia    Laceration of extensor muscle, fascia and tendon of right ring finger at wrist and hand level, initial encounter    Acquired hypothyroidism    Intractable low back pain    Opioid dependence    Spondylosis 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 myelopathy or radiculopathy    Lumbar radiculopathy    Bilateral stenosis of lateral recess of lumbar spine   .  Since the last visit, He has overall felt well.  he has been compliant with   Current Outpatient Medications:     diazePAM (VALIUM) 2 MG tablet, Take 1 tablet by mouth 2 (Two) Times a Day As Needed (TAKE ONE EVERY 12 HRS AS NEEDED FOR VERTIGO)., Disp: 60 tablet, Rfl: 2    amLODIPine (NORVASC) 5 MG tablet, Take 1 tablet by mouth Daily., Disp: 90 tablet, Rfl: 1    EPINEPHrine (EPIPEN) 0.3 MG/0.3ML solution auto-injector injection, ADMINISTER 0.3 ML IN THE MUSCLE 1 TIME FOR 1 DOSE AS DIRECTED BY PRESCRIBER, Disp: , Rfl:     fluticasone (FLONASE) 50 MCG/ACT nasal spray, 2 sprays into the nostril(s) as directed by provider Daily., Disp: 48 g, Rfl: 1    HYDROcodone-acetaminophen (NORCO) 7.5-325 MG per tablet, , Disp: , Rfl:     irbesartan (AVAPRO) 300 MG tablet, Take 1 tablet by mouth Daily., Disp: 90 tablet, Rfl: 1    levothyroxine  "(SYNTHROID, LEVOTHROID) 25 MCG tablet, Take 1 tablet by mouth Daily., Disp: 90 tablet, Rfl: 1    loratadine (CLARITIN) 10 MG tablet, Take 1 tablet by mouth Daily., Disp: , Rfl:     Multiple Vitamins-Minerals (CENTRAL-KEVON FOR SENIORS PO), Take 1 tablet by mouth Daily. HOLD PER MD INSTR, Disp: , Rfl:     Omega-3 Fatty Acids (FISH OIL) 1000 MG capsule capsule, Take 1 capsule by mouth Daily. HOLD PER MD INSTR, Disp: , Rfl:     ondansetron (Zofran) 4 MG tablet, Take 1 tablet by mouth Every 8 (Eight) Hours As Needed for Nausea or Vomiting., Disp: 20 tablet, Rfl: 5    pantoprazole (PROTONIX) 40 MG EC tablet, Take 1 tablet by mouth Daily., Disp: 90 tablet, Rfl: 1    sildenafil (Viagra) 100 MG tablet, Take 1 tablet by mouth Daily As Needed for Erectile Dysfunction., Disp: 10 tablet, Rfl: 11    simvastatin (ZOCOR) 20 MG tablet, Take 1 tablet by mouth Every Evening., Disp: 90 tablet, Rfl: 1    Thiamine HCl (VITAMIN B-1) 100 MG tablet, Take 5 tablets by mouth Daily. HOLD PER MD INSTR, Disp: , Rfl: .  He denies medication side effects.    All of the other chronic condition(s) listed above are stable w/o issues.    /78   Pulse 58   Temp 98.3 øF (36.8 øC) (Oral)   Resp 14   Ht 177.8 cm (70\")   Wt 92.5 kg (204 lb)   SpO2 97%   BMI 29.27 kg/mý     Results for orders placed or performed during the hospital encounter of 04/20/23   Basic Metabolic Panel    Specimen: Blood   Result Value Ref Range    Glucose 111 (H) 65 - 99 mg/dL    BUN 20 8 - 23 mg/dL    Creatinine 0.98 0.76 - 1.27 mg/dL    Sodium 139 136 - 145 mmol/L    Potassium 4.3 3.5 - 5.2 mmol/L    Chloride 104 98 - 107 mmol/L    CO2 26.9 22.0 - 29.0 mmol/L    Calcium 9.3 8.6 - 10.5 mg/dL    BUN/Creatinine Ratio 20.4 7.0 - 25.0    Anion Gap 8.1 5.0 - 15.0 mmol/L    eGFR 79.4 >60.0 mL/min/1.73   CBC Auto Differential    Specimen: Blood   Result Value Ref Range    WBC 12.98 (H) 3.40 - 10.80 10*3/mm3    RBC 4.16 4.14 - 5.80 10*6/mm3    Hemoglobin 13.3 13.0 - 17.7 g/dL "    Hematocrit 37.9 37.5 - 51.0 %    MCV 91.1 79.0 - 97.0 fL    MCH 32.0 26.6 - 33.0 pg    MCHC 35.1 31.5 - 35.7 g/dL    RDW 12.5 12.3 - 15.4 %    RDW-SD 41.9 37.0 - 54.0 fl    MPV 9.8 6.0 - 12.0 fL    Platelets 218 140 - 450 10*3/mm3    Neutrophil % 83.8 (H) 42.7 - 76.0 %    Lymphocyte % 9.7 (L) 19.6 - 45.3 %    Monocyte % 6.0 5.0 - 12.0 %    Eosinophil % 0.2 (L) 0.3 - 6.2 %    Basophil % 0.1 0.0 - 1.5 %    Immature Grans % 0.2 0.0 - 0.5 %    Neutrophils, Absolute 10.88 (H) 1.70 - 7.00 10*3/mm3    Lymphocytes, Absolute 1.26 0.70 - 3.10 10*3/mm3    Monocytes, Absolute 0.78 0.10 - 0.90 10*3/mm3    Eosinophils, Absolute 0.02 0.00 - 0.40 10*3/mm3    Basophils, Absolute 0.01 0.00 - 0.20 10*3/mm3    Immature Grans, Absolute 0.03 0.00 - 0.05 10*3/mm3   ECG 12 Lead Bradycardia   Result Value Ref Range    QT Interval 391 ms             The following portions of the patient's history were reviewed and updated as appropriate: allergies, current medications, past family history, past medical history, past social history, past surgical history, and problem list.    Review of Systems   Constitutional:  Negative for activity change, chills and fever.   Respiratory:  Negative for cough.    Cardiovascular:  Negative for chest pain.   Psychiatric/Behavioral:  Negative for dysphoric mood.      Objective              Physical Exam  Constitutional:       General: He is not in acute distress.     Appearance: He is well-developed.   Cardiovascular:      Rate and Rhythm: Normal rate and regular rhythm.   Pulmonary:      Effort: Pulmonary effort is normal.      Breath sounds: Normal breath sounds.   Neurological:      Mental Status: He is alert and oriented to person, place, and time.   Psychiatric:         Behavior: Behavior normal.         Thought Content: Thought content normal.           Diagnoses and all orders for this visit:    1. Anxiety  -     diazePAM (VALIUM) 2 MG tablet; Take 1 tablet by mouth 2 (Two) Times a Day As Needed (TAKE  ONE EVERY 12 HRS AS NEEDED FOR VERTIGO).  Dispense: 60 tablet; Refill: 2

## 2023-08-23 ENCOUNTER — OFFICE VISIT (OUTPATIENT)
Dept: FAMILY MEDICINE CLINIC | Facility: CLINIC | Age: 78
End: 2023-08-23
Payer: MEDICARE

## 2023-08-23 VITALS
HEIGHT: 70 IN | OXYGEN SATURATION: 97 % | RESPIRATION RATE: 14 BRPM | WEIGHT: 204 LBS | BODY MASS INDEX: 29.2 KG/M2 | HEART RATE: 58 BPM | TEMPERATURE: 98.3 F | DIASTOLIC BLOOD PRESSURE: 78 MMHG | SYSTOLIC BLOOD PRESSURE: 127 MMHG

## 2023-08-23 DIAGNOSIS — F41.9 ANXIETY: Chronic | ICD-10-CM

## 2023-08-23 PROCEDURE — 1159F MED LIST DOCD IN RCRD: CPT | Performed by: FAMILY MEDICINE

## 2023-08-23 PROCEDURE — 3078F DIAST BP <80 MM HG: CPT | Performed by: FAMILY MEDICINE

## 2023-08-23 PROCEDURE — 3074F SYST BP LT 130 MM HG: CPT | Performed by: FAMILY MEDICINE

## 2023-08-23 PROCEDURE — 1160F RVW MEDS BY RX/DR IN RCRD: CPT | Performed by: FAMILY MEDICINE

## 2023-08-23 PROCEDURE — 99213 OFFICE O/P EST LOW 20 MIN: CPT | Performed by: FAMILY MEDICINE

## 2023-08-23 RX ORDER — GABAPENTIN 600 MG/1
600 TABLET ORAL 3 TIMES DAILY
COMMUNITY
End: 2023-08-23 | Stop reason: SDUPTHER

## 2023-08-23 RX ORDER — DIAZEPAM 2 MG/1
2 TABLET ORAL 2 TIMES DAILY PRN
Qty: 60 TABLET | Refills: 2 | Status: SHIPPED | OUTPATIENT
Start: 2023-08-23

## 2023-11-16 ENCOUNTER — OFFICE VISIT (OUTPATIENT)
Dept: FAMILY MEDICINE CLINIC | Facility: CLINIC | Age: 78
End: 2023-11-16
Payer: MEDICARE

## 2023-11-16 VITALS
SYSTOLIC BLOOD PRESSURE: 130 MMHG | RESPIRATION RATE: 16 BRPM | OXYGEN SATURATION: 99 % | HEIGHT: 70 IN | BODY MASS INDEX: 29.06 KG/M2 | HEART RATE: 57 BPM | TEMPERATURE: 97.2 F | DIASTOLIC BLOOD PRESSURE: 81 MMHG | WEIGHT: 203 LBS

## 2023-11-16 DIAGNOSIS — R11.0 NAUSEA: ICD-10-CM

## 2023-11-16 DIAGNOSIS — F41.9 ANXIETY: Chronic | ICD-10-CM

## 2023-11-16 DIAGNOSIS — E03.9 ACQUIRED HYPOTHYROIDISM: Chronic | ICD-10-CM

## 2023-11-16 DIAGNOSIS — K21.9 GASTROESOPHAGEAL REFLUX DISEASE WITHOUT ESOPHAGITIS: Chronic | ICD-10-CM

## 2023-11-16 DIAGNOSIS — E78.2 MIXED HYPERLIPIDEMIA: Chronic | ICD-10-CM

## 2023-11-16 DIAGNOSIS — J30.1 CHRONIC SEASONAL ALLERGIC RHINITIS DUE TO POLLEN: Chronic | ICD-10-CM

## 2023-11-16 DIAGNOSIS — I10 BENIGN ESSENTIAL HTN: Primary | Chronic | ICD-10-CM

## 2023-11-16 PROCEDURE — 3075F SYST BP GE 130 - 139MM HG: CPT | Performed by: FAMILY MEDICINE

## 2023-11-16 PROCEDURE — 3079F DIAST BP 80-89 MM HG: CPT | Performed by: FAMILY MEDICINE

## 2023-11-16 PROCEDURE — 1160F RVW MEDS BY RX/DR IN RCRD: CPT | Performed by: FAMILY MEDICINE

## 2023-11-16 PROCEDURE — 99214 OFFICE O/P EST MOD 30 MIN: CPT | Performed by: FAMILY MEDICINE

## 2023-11-16 PROCEDURE — 1159F MED LIST DOCD IN RCRD: CPT | Performed by: FAMILY MEDICINE

## 2023-11-16 RX ORDER — ONDANSETRON 4 MG/1
4 TABLET, FILM COATED ORAL EVERY 8 HOURS PRN
Qty: 20 TABLET | Refills: 5 | Status: SHIPPED | OUTPATIENT
Start: 2023-11-16

## 2023-11-16 RX ORDER — DIAZEPAM 2 MG/1
2 TABLET ORAL 2 TIMES DAILY PRN
Qty: 60 TABLET | Refills: 2 | Status: SHIPPED | OUTPATIENT
Start: 2023-11-16

## 2023-11-16 RX ORDER — PANTOPRAZOLE SODIUM 40 MG/1
40 TABLET, DELAYED RELEASE ORAL DAILY
Qty: 90 TABLET | Refills: 1 | Status: SHIPPED | OUTPATIENT
Start: 2023-11-16

## 2023-11-16 RX ORDER — FLUTICASONE PROPIONATE 50 MCG
2 SPRAY, SUSPENSION (ML) NASAL DAILY
Qty: 48 G | Refills: 1 | Status: SHIPPED | OUTPATIENT
Start: 2023-11-16

## 2023-11-16 RX ORDER — IRBESARTAN 300 MG/1
300 TABLET ORAL DAILY
Qty: 90 TABLET | Refills: 1 | Status: SHIPPED | OUTPATIENT
Start: 2023-11-16

## 2023-11-16 RX ORDER — AMLODIPINE BESYLATE 5 MG/1
5 TABLET ORAL DAILY
Qty: 90 TABLET | Refills: 1 | Status: SHIPPED | OUTPATIENT
Start: 2023-11-16

## 2023-11-16 RX ORDER — SIMVASTATIN 20 MG
20 TABLET ORAL EVERY EVENING
Qty: 90 TABLET | Refills: 1 | Status: SHIPPED | OUTPATIENT
Start: 2023-11-16

## 2023-11-16 RX ORDER — LEVOTHYROXINE SODIUM 0.03 MG/1
25 TABLET ORAL DAILY
Qty: 90 TABLET | Refills: 1 | Status: SHIPPED | OUTPATIENT
Start: 2023-11-16

## 2023-11-16 NOTE — PROGRESS NOTES
Chief Complaint:   Chief Complaint   Patient presents with    Follow-up    Hypertension    Hyperlipidemia    Med Refill     6 month follow up        Kali Crespo 78 y.o. male who presents today for Medical Management of the below listed issues. He  has a problem list of   Patient Active Problem List   Diagnosis    Cervical pain    Migraine without aura and without status migrainosus, not intractable    Vertigo    Benign essential HTN    Acid reflux    Encounter for pre-operative cardiovascular clearance    Abnormal EKG    Hyperlipidemia    Erectile dysfunction    Prostate cancer    Meniere disease, bilateral    DDD (degenerative disc disease), lumbar    Chronic midline low back pain with left-sided sciatica    Malignant neoplastic disease    Alternating esotropia    Diplopia    Laceration of extensor muscle, fascia and tendon of right ring finger at wrist and hand level, initial encounter    Acquired hypothyroidism    Intractable low back pain    Opioid dependence    Spondylosis 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 myelopathy or radiculopathy    Lumbar radiculopathy    Bilateral stenosis of lateral recess of lumbar spine   .  Since the last visit, He has overall felt well.  he has been compliant with   Current Outpatient Medications:     amLODIPine (NORVASC) 5 MG tablet, Take 1 tablet by mouth Daily., Disp: 90 tablet, Rfl: 1    diazePAM (VALIUM) 2 MG tablet, Take 1 tablet by mouth 2 (Two) Times a Day As Needed (TAKE ONE EVERY 12 HRS AS NEEDED FOR VERTIGO)., Disp: 60 tablet, Rfl: 2    EPINEPHrine (EPIPEN) 0.3 MG/0.3ML solution auto-injector injection, ADMINISTER 0.3 ML IN THE MUSCLE 1 TIME FOR 1 DOSE AS DIRECTED BY PRESCRIBER, Disp: , Rfl:     fluticasone (FLONASE) 50 MCG/ACT nasal spray, 2 sprays into the nostril(s) as directed by provider Daily., Disp: 48 g, Rfl: 1    HYDROcodone-acetaminophen (NORCO) 7.5-325 MG per tablet, , Disp: , Rfl:     irbesartan (AVAPRO) 300 MG tablet, Take 1 tablet by mouth Daily., Disp: 90  "tablet, Rfl: 1    levothyroxine (SYNTHROID, LEVOTHROID) 25 MCG tablet, Take 1 tablet by mouth Daily., Disp: 90 tablet, Rfl: 1    loratadine (CLARITIN) 10 MG tablet, Take 1 tablet by mouth Daily., Disp: , Rfl:     Multiple Vitamins-Minerals (CENTRAL-KEVON FOR SENIORS PO), Take 1 tablet by mouth Daily. HOLD PER MD INSTR, Disp: , Rfl:     Omega-3 Fatty Acids (FISH OIL) 1000 MG capsule capsule, Take 1 capsule by mouth Daily. HOLD PER MD INSTR, Disp: , Rfl:     ondansetron (Zofran) 4 MG tablet, Take 1 tablet by mouth Every 8 (Eight) Hours As Needed for Nausea or Vomiting., Disp: 20 tablet, Rfl: 5    pantoprazole (PROTONIX) 40 MG EC tablet, Take 1 tablet by mouth Daily., Disp: 90 tablet, Rfl: 1    sildenafil (Viagra) 100 MG tablet, Take 1 tablet by mouth Daily As Needed for Erectile Dysfunction., Disp: 10 tablet, Rfl: 11    simvastatin (ZOCOR) 20 MG tablet, Take 1 tablet by mouth Every Evening., Disp: 90 tablet, Rfl: 1    Thiamine HCl (VITAMIN B-1) 100 MG tablet, Take 5 tablets by mouth Daily. HOLD PER MD INSTR, Disp: , Rfl: .  He denies medication side effects.    All of the other chronic condition(s) listed above are stable w/o issues.    /81 (BP Location: Right arm, Patient Position: Sitting)   Pulse 57   Temp 97.2 °F (36.2 °C) (Oral)   Resp 16   Ht 177.8 cm (70\")   Wt 92.1 kg (203 lb)   SpO2 99%   BMI 29.13 kg/m²     Results for orders placed or performed in visit on 10/16/23   PSA DIAGNOSTIC    Specimen: Blood   Result Value Ref Range    PSA <0.1 0.0 - 4.0 ng/mL   Comprehensive metabolic panel    Specimen: Blood   Result Value Ref Range    Glucose 92 70 - 99 mg/dL    BUN 21 8 - 27 mg/dL    Creatinine 1.05 0.76 - 1.27 mg/dL    EGFR Result 73 >59 mL/min/1.73    BUN/Creatinine Ratio 20 10 - 24    Sodium 141 134 - 144 mmol/L    Potassium 4.9 3.5 - 5.2 mmol/L    Chloride 103 96 - 106 mmol/L    Total CO2 26 20 - 29 mmol/L    Calcium 9.3 8.6 - 10.2 mg/dL    Total Protein 6.0 6.0 - 8.5 g/dL    Albumin 4.3 3.8 - " 4.8 g/dL    Globulin 1.7 1.5 - 4.5 g/dL    A/G Ratio 2.5 (H) 1.2 - 2.2    Total Bilirubin 0.5 0.0 - 1.2 mg/dL    Alkaline Phosphatase 101 44 - 121 IU/L    AST (SGOT) 13 0 - 40 IU/L    ALT (SGPT) 13 0 - 44 IU/L   Lipid panel    Specimen: Blood   Result Value Ref Range    Total Cholesterol 190 100 - 199 mg/dL    Triglycerides 121 0 - 149 mg/dL    HDL Cholesterol 43 >39 mg/dL    VLDL Cholesterol Farrukh 22 5 - 40 mg/dL    LDL Chol Calc (NIH) 125 (H) 0 - 99 mg/dL   TSH    Specimen: Blood   Result Value Ref Range    TSH 3.510 0.450 - 4.500 uIU/mL   T4, Free    Specimen: Blood   Result Value Ref Range    Free T4 1.29 0.82 - 1.77 ng/dL   CBC and Differential    Specimen: Blood   Result Value Ref Range    WBC 4.8 3.4 - 10.8 x10E3/uL    RBC 4.39 4.14 - 5.80 x10E6/uL    Hemoglobin 14.0 13.0 - 17.7 g/dL    Hematocrit 41.0 37.5 - 51.0 %    MCV 93 79 - 97 fL    MCH 31.9 26.6 - 33.0 pg    MCHC 34.1 31.5 - 35.7 g/dL    RDW 12.8 11.6 - 15.4 %    Platelets 212 150 - 450 x10E3/uL    Neutrophil Rel % 60 Not Estab. %    Lymphocyte Rel % 24 Not Estab. %    Monocyte Rel % 7 Not Estab. %    Eosinophil Rel % 8 Not Estab. %    Basophil Rel % 1 Not Estab. %    Neutrophils Absolute 2.9 1.4 - 7.0 x10E3/uL    Lymphocytes Absolute 1.2 0.7 - 3.1 x10E3/uL    Monocytes Absolute 0.3 0.1 - 0.9 x10E3/uL    Eosinophils Absolute 0.4 0.0 - 0.4 x10E3/uL    Basophils Absolute 0.1 0.0 - 0.2 x10E3/uL    Immature Granulocyte Rel % 0 Not Estab. %    Immature Grans Absolute 0.0 0.0 - 0.1 x10E3/uL             The following portions of the patient's history were reviewed and updated as appropriate: allergies, current medications, past family history, past medical history, past social history, past surgical history, and problem list.    Review of Systems   Constitutional:  Negative for activity change, chills and fever.   Respiratory:  Negative for cough.    Cardiovascular:  Negative for chest pain.   Psychiatric/Behavioral:  Negative for dysphoric mood.         Objective              Physical Exam  Constitutional:       General: He is not in acute distress.     Appearance: He is well-developed.   Cardiovascular:      Rate and Rhythm: Normal rate and regular rhythm.   Pulmonary:      Effort: Pulmonary effort is normal.      Breath sounds: Normal breath sounds.   Neurological:      Mental Status: He is alert and oriented to person, place, and time.   Psychiatric:         Behavior: Behavior normal.         Thought Content: Thought content normal.     Labs reviewed with pt today during visit. All questions answered.          Diagnoses and all orders for this visit:    1. Benign essential HTN (Primary)  -     amLODIPine (NORVASC) 5 MG tablet; Take 1 tablet by mouth Daily.  Dispense: 90 tablet; Refill: 1  -     irbesartan (AVAPRO) 300 MG tablet; Take 1 tablet by mouth Daily.  Dispense: 90 tablet; Refill: 1  -     Comprehensive metabolic panel; Future  -     Lipid panel; Future  -     CBC and Differential; Future    2. Anxiety  -     diazePAM (VALIUM) 2 MG tablet; Take 1 tablet by mouth 2 (Two) Times a Day As Needed (TAKE ONE EVERY 12 HRS AS NEEDED FOR VERTIGO).  Dispense: 60 tablet; Refill: 2    3. Chronic seasonal allergic rhinitis due to pollen  -     fluticasone (FLONASE) 50 MCG/ACT nasal spray; 2 sprays into the nostril(s) as directed by provider Daily.  Dispense: 48 g; Refill: 1    4. Acquired hypothyroidism  -     levothyroxine (SYNTHROID, LEVOTHROID) 25 MCG tablet; Take 1 tablet by mouth Daily.  Dispense: 90 tablet; Refill: 1  -     TSH; Future  -     T4, Free; Future    5. Gastroesophageal reflux disease without esophagitis  -     pantoprazole (PROTONIX) 40 MG EC tablet; Take 1 tablet by mouth Daily.  Dispense: 90 tablet; Refill: 1    6. Mixed hyperlipidemia  -     simvastatin (ZOCOR) 20 MG tablet; Take 1 tablet by mouth Every Evening.  Dispense: 90 tablet; Refill: 1  -     Lipid panel; Future    7. Nausea  -     ondansetron (Zofran) 4 MG tablet; Take 1 tablet by  mouth Every 8 (Eight) Hours As Needed for Nausea or Vomiting.  Dispense: 20 tablet; Refill: 5

## 2024-01-12 ENCOUNTER — TELEPHONE (OUTPATIENT)
Dept: FAMILY MEDICINE CLINIC | Facility: CLINIC | Age: 79
End: 2024-01-12
Payer: MEDICARE

## 2024-01-12 NOTE — TELEPHONE ENCOUNTER
ATTEMPTED TO WARM TRANSFER BUT NO ANSWER    Caller: No Crespo    Relationship to patient: Emergency Contact    Best call back number: [phone]    Chief complaint: MWV    Type of visit: MWV    If rescheduling, when is the original appointment: 5/16/24     Additional notes:NEEDS AN APPOINTMENT WITH HIS WIFE AND THE SOONEST WE COULD FIND IS SEPT.  THEY WANT ONE MUCH SOONER THAN THAT

## 2024-02-18 NOTE — PROGRESS NOTES
Chief Complaint:   Chief Complaint   Patient presents with    Anxiety     Med refill due  / rachell        Kali GUALLPA Zak 78 y.o. male who presents today for Medical Management of the below listed issues. He  has a problem list of   Patient Active Problem List   Diagnosis    Cervical pain    Migraine without aura and without status migrainosus, not intractable    Vertigo    Benign essential HTN    Acid reflux    Encounter for pre-operative cardiovascular clearance    Abnormal EKG    Hyperlipidemia    Erectile dysfunction    Prostate cancer    Meniere disease, bilateral    DDD (degenerative disc disease), lumbar    Chronic midline low back pain with left-sided sciatica    Malignant neoplastic disease    Alternating esotropia    Diplopia    Laceration of extensor muscle, fascia and tendon of right ring finger at wrist and hand level, initial encounter    Acquired hypothyroidism    Intractable low back pain    Opioid dependence    Spondylosis 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 myelopathy or radiculopathy    Lumbar radiculopathy    Bilateral stenosis of lateral recess of lumbar spine   .  Since the last visit, He has overall felt well.  he has been compliant with   Current Outpatient Medications:     diazePAM (VALIUM) 2 MG tablet, Take 1 tablet by mouth 2 (Two) Times a Day As Needed (TAKE ONE EVERY 12 HRS AS NEEDED FOR VERTIGO)., Disp: 60 tablet, Rfl: 2    naloxone (NARCAN) 4 MG/0.1ML nasal spray, CALL 911. SPR CONTENTS OF ONE SPRAYER (0.1ML) INTO ONE NOSTRIL. REPEAT IN 2-3 MIN IF SYMPTOMS OF OPIOID EMERGENCY PERSIST, ALTERNATE NOSTRILS, Disp: , Rfl:     amLODIPine (NORVASC) 5 MG tablet, Take 1 tablet by mouth Daily., Disp: 90 tablet, Rfl: 1    EPINEPHrine (EPIPEN) 0.3 MG/0.3ML solution auto-injector injection, ADMINISTER 0.3 ML IN THE MUSCLE 1 TIME FOR 1 DOSE AS DIRECTED BY PRESCRIBER, Disp: , Rfl:     fluticasone (FLONASE) 50 MCG/ACT nasal spray, 2 sprays into the nostril(s) as directed by provider Daily., Disp: 48 g,  "Rfl: 1    gabapentin (NEURONTIN) 600 MG tablet, Take 1 tablet by mouth Daily., Disp: , Rfl:     HYDROcodone-acetaminophen (NORCO) 7.5-325 MG per tablet, , Disp: , Rfl:     irbesartan (AVAPRO) 300 MG tablet, Take 1 tablet by mouth Daily., Disp: 90 tablet, Rfl: 1    levothyroxine (SYNTHROID, LEVOTHROID) 25 MCG tablet, Take 1 tablet by mouth Daily., Disp: 90 tablet, Rfl: 1    loratadine (CLARITIN) 10 MG tablet, Take 1 tablet by mouth Daily., Disp: , Rfl:     Multiple Vitamins-Minerals (CENTRAL-KEVON FOR SENIORS PO), Take 1 tablet by mouth Daily. HOLD PER MD INSTR, Disp: , Rfl:     Omega-3 Fatty Acids (FISH OIL) 1000 MG capsule capsule, Take 1 capsule by mouth Daily. HOLD PER MD INSTR, Disp: , Rfl:     ondansetron (Zofran) 4 MG tablet, Take 1 tablet by mouth Every 8 (Eight) Hours As Needed for Nausea or Vomiting., Disp: 20 tablet, Rfl: 5    pantoprazole (PROTONIX) 40 MG EC tablet, Take 1 tablet by mouth Daily., Disp: 90 tablet, Rfl: 1    sildenafil (Viagra) 100 MG tablet, Take 1 tablet by mouth Daily As Needed for Erectile Dysfunction., Disp: 10 tablet, Rfl: 11    simvastatin (ZOCOR) 20 MG tablet, Take 1 tablet by mouth Every Evening., Disp: 90 tablet, Rfl: 1    Thiamine HCl (VITAMIN B-1) 100 MG tablet, Take 5 tablets by mouth Daily. HOLD PER MD INSTR, Disp: , Rfl: .  He denies medication side effects.    All of the other chronic condition(s) listed above are stable w/o issues.    /64   Pulse 64   Temp 97.8 °F (36.6 °C) (Oral)   Resp 14   Ht 177.8 cm (70\")   Wt 92.5 kg (204 lb)   SpO2 96%   BMI 29.27 kg/m²     Results for orders placed or performed in visit on 10/16/23   PSA DIAGNOSTIC    Specimen: Blood   Result Value Ref Range    PSA <0.1 0.0 - 4.0 ng/mL   Comprehensive metabolic panel    Specimen: Blood   Result Value Ref Range    Glucose 92 70 - 99 mg/dL    BUN 21 8 - 27 mg/dL    Creatinine 1.05 0.76 - 1.27 mg/dL    EGFR Result 73 >59 mL/min/1.73    BUN/Creatinine Ratio 20 10 - 24    Sodium 141 134 - 144 " mmol/L    Potassium 4.9 3.5 - 5.2 mmol/L    Chloride 103 96 - 106 mmol/L    Total CO2 26 20 - 29 mmol/L    Calcium 9.3 8.6 - 10.2 mg/dL    Total Protein 6.0 6.0 - 8.5 g/dL    Albumin 4.3 3.8 - 4.8 g/dL    Globulin 1.7 1.5 - 4.5 g/dL    A/G Ratio 2.5 (H) 1.2 - 2.2    Total Bilirubin 0.5 0.0 - 1.2 mg/dL    Alkaline Phosphatase 101 44 - 121 IU/L    AST (SGOT) 13 0 - 40 IU/L    ALT (SGPT) 13 0 - 44 IU/L   Lipid panel    Specimen: Blood   Result Value Ref Range    Total Cholesterol 190 100 - 199 mg/dL    Triglycerides 121 0 - 149 mg/dL    HDL Cholesterol 43 >39 mg/dL    VLDL Cholesterol Farrukh 22 5 - 40 mg/dL    LDL Chol Calc (NIH) 125 (H) 0 - 99 mg/dL   TSH    Specimen: Blood   Result Value Ref Range    TSH 3.510 0.450 - 4.500 uIU/mL   T4, Free    Specimen: Blood   Result Value Ref Range    Free T4 1.29 0.82 - 1.77 ng/dL   CBC and Differential    Specimen: Blood   Result Value Ref Range    WBC 4.8 3.4 - 10.8 x10E3/uL    RBC 4.39 4.14 - 5.80 x10E6/uL    Hemoglobin 14.0 13.0 - 17.7 g/dL    Hematocrit 41.0 37.5 - 51.0 %    MCV 93 79 - 97 fL    MCH 31.9 26.6 - 33.0 pg    MCHC 34.1 31.5 - 35.7 g/dL    RDW 12.8 11.6 - 15.4 %    Platelets 212 150 - 450 x10E3/uL    Neutrophil Rel % 60 Not Estab. %    Lymphocyte Rel % 24 Not Estab. %    Monocyte Rel % 7 Not Estab. %    Eosinophil Rel % 8 Not Estab. %    Basophil Rel % 1 Not Estab. %    Neutrophils Absolute 2.9 1.4 - 7.0 x10E3/uL    Lymphocytes Absolute 1.2 0.7 - 3.1 x10E3/uL    Monocytes Absolute 0.3 0.1 - 0.9 x10E3/uL    Eosinophils Absolute 0.4 0.0 - 0.4 x10E3/uL    Basophils Absolute 0.1 0.0 - 0.2 x10E3/uL    Immature Granulocyte Rel % 0 Not Estab. %    Immature Grans Absolute 0.0 0.0 - 0.1 x10E3/uL             The following portions of the patient's history were reviewed and updated as appropriate: allergies, current medications, past family history, past medical history, past social history, past surgical history, and problem list.    Review of Systems   Constitutional:   Negative for activity change, chills and fever.   Respiratory:  Negative for cough.    Cardiovascular:  Negative for chest pain.   Psychiatric/Behavioral:  Negative for dysphoric mood.        Objective     BMI is >= 25 and <30. (Overweight) The following options were offered after discussion;: exercise counseling/recommendations and nutrition counseling/recommendations        Physical Exam  Constitutional:       General: He is not in acute distress.     Appearance: He is well-developed.   Cardiovascular:      Rate and Rhythm: Normal rate and regular rhythm.   Pulmonary:      Effort: Pulmonary effort is normal.      Breath sounds: Normal breath sounds.   Neurological:      Mental Status: He is alert and oriented to person, place, and time.   Psychiatric:         Behavior: Behavior normal.         Thought Content: Thought content normal.             Diagnoses and all orders for this visit:    1. Anxiety (Primary)  -     diazePAM (VALIUM) 2 MG tablet; Take 1 tablet by mouth 2 (Two) Times a Day As Needed (TAKE ONE EVERY 12 HRS AS NEEDED FOR VERTIGO).  Dispense: 60 tablet; Refill: 2    Patient sees pain management and gets 4 times per year urine drug screens completed there.

## 2024-02-19 ENCOUNTER — OFFICE VISIT (OUTPATIENT)
Dept: FAMILY MEDICINE CLINIC | Facility: CLINIC | Age: 79
End: 2024-02-19
Payer: MEDICARE

## 2024-02-19 VITALS
HEART RATE: 64 BPM | BODY MASS INDEX: 29.2 KG/M2 | RESPIRATION RATE: 14 BRPM | TEMPERATURE: 97.8 F | HEIGHT: 70 IN | OXYGEN SATURATION: 96 % | WEIGHT: 204 LBS | SYSTOLIC BLOOD PRESSURE: 126 MMHG | DIASTOLIC BLOOD PRESSURE: 64 MMHG

## 2024-02-19 DIAGNOSIS — F41.9 ANXIETY: Primary | Chronic | ICD-10-CM

## 2024-02-19 PROCEDURE — 1159F MED LIST DOCD IN RCRD: CPT | Performed by: FAMILY MEDICINE

## 2024-02-19 PROCEDURE — 3074F SYST BP LT 130 MM HG: CPT | Performed by: FAMILY MEDICINE

## 2024-02-19 PROCEDURE — 99213 OFFICE O/P EST LOW 20 MIN: CPT | Performed by: FAMILY MEDICINE

## 2024-02-19 PROCEDURE — 3078F DIAST BP <80 MM HG: CPT | Performed by: FAMILY MEDICINE

## 2024-02-19 PROCEDURE — 1160F RVW MEDS BY RX/DR IN RCRD: CPT | Performed by: FAMILY MEDICINE

## 2024-02-19 RX ORDER — NALOXONE HYDROCHLORIDE 4 MG/.1ML
SPRAY NASAL
COMMUNITY
Start: 2024-01-26

## 2024-02-19 RX ORDER — GABAPENTIN 600 MG/1
600 TABLET ORAL DAILY
COMMUNITY
Start: 2024-02-16

## 2024-02-19 RX ORDER — DIAZEPAM 2 MG/1
2 TABLET ORAL 2 TIMES DAILY PRN
Qty: 60 TABLET | Refills: 2 | Status: SHIPPED | OUTPATIENT
Start: 2024-02-19

## 2024-04-04 DIAGNOSIS — E78.2 MIXED HYPERLIPIDEMIA: Chronic | ICD-10-CM

## 2024-04-04 DIAGNOSIS — E03.9 ACQUIRED HYPOTHYROIDISM: Chronic | ICD-10-CM

## 2024-04-04 DIAGNOSIS — K21.9 GASTROESOPHAGEAL REFLUX DISEASE WITHOUT ESOPHAGITIS: Chronic | ICD-10-CM

## 2024-04-04 DIAGNOSIS — I10 BENIGN ESSENTIAL HTN: Chronic | ICD-10-CM

## 2024-04-04 RX ORDER — PANTOPRAZOLE SODIUM 40 MG/1
40 TABLET, DELAYED RELEASE ORAL DAILY
Qty: 90 TABLET | Refills: 3 | OUTPATIENT
Start: 2024-04-04

## 2024-04-04 RX ORDER — LEVOTHYROXINE SODIUM 0.03 MG/1
25 TABLET ORAL DAILY
Qty: 90 TABLET | Refills: 3 | OUTPATIENT
Start: 2024-04-04

## 2024-04-04 RX ORDER — SIMVASTATIN 20 MG
20 TABLET ORAL EVERY EVENING
Qty: 90 TABLET | Refills: 3 | OUTPATIENT
Start: 2024-04-04

## 2024-04-04 RX ORDER — IRBESARTAN 300 MG/1
300 TABLET ORAL DAILY
Qty: 90 TABLET | Refills: 3 | OUTPATIENT
Start: 2024-04-04

## 2024-04-04 RX ORDER — AMLODIPINE BESYLATE 5 MG/1
5 TABLET ORAL DAILY
Qty: 90 TABLET | Refills: 3 | OUTPATIENT
Start: 2024-04-04

## 2024-05-14 RX ORDER — ONDANSETRON 4 MG/1
4 TABLET, FILM COATED ORAL EVERY 8 HOURS PRN
Qty: 20 TABLET | Refills: 5 | Status: CANCELLED | OUTPATIENT
Start: 2024-05-14

## 2024-05-15 NOTE — PROGRESS NOTES
The ABCs of the Annual Wellness Visit  Subsequent Medicare Wellness Visit    Subjective    Kali Crespo is a 78 y.o. male who presents for a Subsequent Medicare Wellness Visit.    The following portions of the patient's history were reviewed and   updated as appropriate: allergies, current medications, past family history, past medical history, past social history, past surgical history, and problem list.    Compared to one year ago, the patient feels his physical   health is the same.    Compared to one year ago, the patient feels his mental   health is the same.    Recent Hospitalizations:  He was not admitted to the hospital during the last year.       Current Medical Providers:  Patient Care Team:  Nathan Arango MD as PCP - General  Nathan Arango MD as PCP - Family Medicine  Nena Ramey APRN (Dermatology)  Siddharth Ho MD as Consulting Physician (Pain Medicine)  Wolf Diaz MD as Surgeon (Neurosurgery)    Outpatient Medications Prior to Visit   Medication Sig Dispense Refill    sildenafil (Viagra) 100 MG tablet Take 1 tablet by mouth Daily As Needed for Erectile Dysfunction. 10 tablet 11    ondansetron (Zofran) 4 MG tablet Take 1 tablet by mouth Every 8 (Eight) Hours As Needed for Nausea or Vomiting. 20 tablet 5    EPINEPHrine (EPIPEN) 0.3 MG/0.3ML solution auto-injector injection ADMINISTER 0.3 ML IN THE MUSCLE 1 TIME FOR 1 DOSE AS DIRECTED BY PRESCRIBER      gabapentin (NEURONTIN) 600 MG tablet Take 1 tablet by mouth Daily.      HYDROcodone-acetaminophen (NORCO) 7.5-325 MG per tablet       loratadine (CLARITIN) 10 MG tablet Take 1 tablet by mouth Daily.      Multiple Vitamins-Minerals (CENTRAL-KEVON FOR SENIORS PO) Take 1 tablet by mouth Daily. HOLD PER MD HERNANDEZ      naloxone (NARCAN) 4 MG/0.1ML nasal spray CALL 911. SPR CONTENTS OF ONE SPRAYER (0.1ML) INTO ONE NOSTRIL. REPEAT IN 2-3 MIN IF SYMPTOMS OF OPIOID EMERGENCY PERSIST, ALTERNATE NOSTRILS      Omega-3 Fatty Acids (FISH OIL)  1000 MG capsule capsule Take 1 capsule by mouth Daily. HOLD PER MD INSTR      Thiamine HCl (VITAMIN B-1) 100 MG tablet Take 5 tablets by mouth Daily. HOLD PER MD INSTR      amLODIPine (NORVASC) 5 MG tablet Take 1 tablet by mouth Daily. 90 tablet 1    diazePAM (VALIUM) 2 MG tablet Take 1 tablet by mouth 2 (Two) Times a Day As Needed (TAKE ONE EVERY 12 HRS AS NEEDED FOR VERTIGO). 60 tablet 2    fluticasone (FLONASE) 50 MCG/ACT nasal spray 2 sprays into the nostril(s) as directed by provider Daily. 48 g 1    irbesartan (AVAPRO) 300 MG tablet Take 1 tablet by mouth Daily. 90 tablet 1    levothyroxine (SYNTHROID, LEVOTHROID) 25 MCG tablet Take 1 tablet by mouth Daily. 90 tablet 1    pantoprazole (PROTONIX) 40 MG EC tablet Take 1 tablet by mouth Daily. 90 tablet 1    simvastatin (ZOCOR) 20 MG tablet Take 1 tablet by mouth Every Evening. 90 tablet 1     No facility-administered medications prior to visit.       Opioid medication/s are on active medication list.  and I have evaluated his active treatment plan and pain score trends (see table).  Vitals:    05/21/24 1010   PainSc: 0-No pain     I have reviewed the chart for potential of high risk medication and harmful drug interactions in the elderly.          Aspirin is not on active medication list.  Aspirin use is not indicated based on review of current medical condition/s. Risk of harm outweighs potential benefits.  .    Patient Active Problem List   Diagnosis    Cervical pain    Migraine without aura and without status migrainosus, not intractable    Vertigo    Benign essential HTN    Acid reflux    Encounter for pre-operative cardiovascular clearance    Abnormal EKG    Hyperlipidemia    Erectile dysfunction    Prostate cancer    Meniere disease, bilateral    DDD (degenerative disc disease), lumbar    Chronic midline low back pain with left-sided sciatica    Malignant neoplastic disease    Alternating esotropia    Diplopia    Laceration of extensor muscle, fascia and  "tendon of right ring finger at wrist and hand level, initial encounter    Acquired hypothyroidism    Intractable low back pain    Opioid dependence    Spondylosis 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 myelopathy or radiculopathy    Lumbar radiculopathy    Bilateral stenosis of lateral recess of lumbar spine     Advance Care Planning   Advance Care Planning     Advance Directive is on file.  ACP discussion was held with the patient during this visit. Patient has an advance directive in EMR which is still valid.      Objective    Vitals:    24 1010   BP: 135/78   Pulse: 70   Resp: 16   Temp: 98.1 °F (36.7 °C)   TempSrc: Oral   SpO2: 96%   Weight: 90.3 kg (199 lb)   Height: 177.8 cm (70\")   PainSc: 0-No pain     Estimated body mass index is 28.55 kg/m² as calculated from the following:    Height as of this encounter: 177.8 cm (70\").    Weight as of this encounter: 90.3 kg (199 lb).           Does the patient have evidence of cognitive impairment? No    Lab Results   Component Value Date    CHLPL 162 2024    TRIG 85 2024    HDL 44 2024     (H) 2024    VLDL 16 2024        HEALTH RISK ASSESSMENT    Smoking Status:  Social History     Tobacco Use   Smoking Status Former    Current packs/day: 0.00    Types: Cigarettes    Start date: 1965    Quit date: 1969    Years since quittin.4   Smokeless Tobacco Never   Tobacco Comments    40 years ago     Alcohol Consumption:  Social History     Substance and Sexual Activity   Alcohol Use No    Comment: seldom     Fall Risk Screen:    YARIELADI Fall Risk Assessment has not been completed.    Depression Screenin/21/2024    10:07 AM   PHQ-2/PHQ-9 Depression Screening   Little Interest or Pleasure in Doing Things 0-->not at all   Feeling Down, Depressed or Hopeless 0-->not at all   PHQ-9: Brief Depression Severity Measure Score 0       Health Habits and Functional and Cognitive Screenin/21/2024    10:00 AM   Functional & Cognitive " Status   Do you have difficulty preparing food and eating? No   Do you have difficulty bathing yourself, getting dressed or grooming yourself? No   Do you have difficulty using the toilet? No   Do you have difficulty moving around from place to place? No   Do you have trouble with steps or getting out of a bed or a chair? No   Current Diet Well Balanced Diet   Dental Exam Not up to date   Eye Exam Not up to date   Exercise (times per week) 0 times per week   Current Exercises Include No Regular Exercise   Do you need help using the phone?  No   Are you deaf or do you have serious difficulty hearing?  No   Do you need help to go to places out of walking distance? No   Do you need help shopping? No   Do you need help preparing meals?  No   Do you need help with housework?  No   Do you need help with laundry? No   Do you need help taking your medications? No   Do you need help managing money? No   Do you ever drive or ride in a car without wearing a seat belt? No   Have you felt unusual stress, anger or loneliness in the last month? No   Who do you live with? Spouse   If you need help, do you have trouble finding someone available to you? No   Have you been bothered in the last four weeks by sexual problems? No   Do you have difficulty concentrating, remembering or making decisions? No       Age-appropriate Screening Schedule:  Refer to the list below for future screening recommendations based on patient's age, sex and/or medical conditions. Orders for these recommended tests are listed in the plan section. The patient has been provided with a written plan.    Health Maintenance   Topic Date Due    COVID-19 Vaccine (6 - 2023-24 season) 08/10/2024 (Originally 9/1/2023)    RSV Vaccine - Adults (1 - 1-dose 60+ series) 02/19/2025 (Originally 7/21/2005)    INFLUENZA VACCINE  08/01/2024    PROSTATE CANCER SCREENING  11/09/2024    BMI FOLLOWUP  02/19/2025    LIPID PANEL  05/09/2025    COLORECTAL CANCER SCREENING  05/13/2025     ANNUAL WELLNESS VISIT  05/21/2025    Pneumococcal Vaccine 65+  Completed    ZOSTER VACCINE  Completed    HEPATITIS C SCREENING  Discontinued    TDAP/TD VACCINES  Discontinued                  CMS Preventative Services Quick Reference  Risk Factors Identified During Encounter  None Identified  The above risks/problems have been discussed with the patient.  Pertinent information has been shared with the patient in the After Visit Summary.  An After Visit Summary and PPPS were made available to the patient.    Follow Up:   Next Medicare Wellness visit to be scheduled in 1 year.       Additional E&M Note during same encounter follows:  Patient has multiple medical problems which are significant and separately identifiable that require additional work above and beyond the Medicare Wellness Visit.      Chief Complaint  Hypertension (Multiple pharm per pt /Meds reviewed with pt today  /Lab results ), Hyperlipidemia, Hypothyroidism, Heartburn, Anxiety, Arthritis (MED  REFILL DUE ), and MEDICARE WELLNESS (DUE )    Subjective        Hypertension  Associated symptoms include anxiety. Pertinent negatives include no chest pain or shortness of breath.   Hyperlipidemia  Exacerbating diseases include hypothyroidism. Pertinent negatives include no chest pain or shortness of breath.   Hypothyroidism  Pertinent negatives include no abdominal pain, chest pain, chills, congestion, coughing or fever.   Heartburn  He reports no abdominal pain, no chest pain or no coughing.   Anxiety   Patient reports no chest pain or shortness of breath.   Arthritis  Pertinent negatives include no dysuria or fever.     Kali Crespo is also being seen today for medication management.    Pt doing well on the medication(s) w/o SEs, and is due refill today.      Review of Systems   Constitutional:  Negative for chills and fever.   HENT:  Negative for congestion, ear pain and sinus pressure.    Eyes:  Negative for pain and visual disturbance.  "  Respiratory:  Negative for cough and shortness of breath.    Cardiovascular:  Negative for chest pain.   Gastrointestinal:  Negative for abdominal pain.   Genitourinary:  Negative for difficulty urinating and dysuria.   Musculoskeletal:  Positive for arthritis.   Skin: Negative.    Neurological: Negative.    Psychiatric/Behavioral:  Negative for dysphoric mood.        Objective   Vital Signs:  /78   Pulse 70   Temp 98.1 °F (36.7 °C) (Oral)   Resp 16   Ht 177.8 cm (70\")   Wt 90.3 kg (199 lb)   SpO2 96%   BMI 28.55 kg/m²     Physical Exam  Vitals and nursing note reviewed.   Constitutional:       General: He is not in acute distress.     Appearance: He is well-developed.   Cardiovascular:      Rate and Rhythm: Normal rate and regular rhythm.   Pulmonary:      Effort: Pulmonary effort is normal.      Breath sounds: Normal breath sounds.   Neurological:      Mental Status: He is alert and oriented to person, place, and time.   Psychiatric:         Behavior: Behavior normal.         Thought Content: Thought content normal.      Labs reviewed with pt today during visit. All questions answered.      The following data was reviewed by: Nathan Arango MD on 05/21/2024:  Common labs          11/9/2023    08:40 5/9/2024    08:24   Common Labs   Glucose 92  88    BUN 21  19    Creatinine 1.05  0.91    Sodium 141  143    Potassium 4.9  4.5    Chloride 103  106    Calcium 9.3  9.1    Total Protein 6.0  5.7    Albumin 4.3  4.2    Total Bilirubin 0.5  0.7    Alkaline Phosphatase 101  90    AST (SGOT) 13  15    ALT (SGPT) 13  14    WBC 4.8  4.58    Hemoglobin 14.0  13.3    Hematocrit 41.0  38.5    Platelets 212  207    Total Cholesterol 190  162    Triglycerides 121  85    HDL Cholesterol 43  44    LDL Cholesterol  125  102    PSA <0.1                  Assessment and Plan   Diagnoses and all orders for this visit:    1. Encounter for subsequent annual wellness visit (AWV) in Medicare patient (Primary)    2. Acquired " hypothyroidism  -     levothyroxine (SYNTHROID, LEVOTHROID) 25 MCG tablet; Take 1 tablet by mouth Daily.  Dispense: 90 tablet; Refill: 1  -     T4, Free; Future  -     TSH; Future    3. Gastroesophageal reflux disease without esophagitis  -     pantoprazole (PROTONIX) 40 MG EC tablet; Take 1 tablet by mouth Daily.  Dispense: 90 tablet; Refill: 1    4. Chronic seasonal allergic rhinitis due to pollen  -     fluticasone (FLONASE) 50 MCG/ACT nasal spray; 2 sprays into the nostril(s) as directed by provider Daily.  Dispense: 48 g; Refill: 1    5. Erectile dysfunction, unspecified erectile dysfunction type    6. Mixed hyperlipidemia  -     simvastatin (ZOCOR) 20 MG tablet; Take 1 tablet by mouth Every Evening.  Dispense: 90 tablet; Refill: 1  -     Lipid Panel; Future    7. Benign essential HTN  -     amLODIPine (NORVASC) 5 MG tablet; Take 1 tablet by mouth Daily.  Dispense: 90 tablet; Refill: 1  -     irbesartan (AVAPRO) 300 MG tablet; Take 1 tablet by mouth Daily.  Dispense: 90 tablet; Refill: 1  -     Lipid Panel; Future  -     Comprehensive Metabolic Panel; Future  -     CBC & Differential; Future    8. Anxiety  -     diazePAM (VALIUM) 2 MG tablet; Take 1 tablet by mouth 2 (Two) Times a Day As Needed (TAKE ONE EVERY 12 HRS AS NEEDED FOR VERTIGO).  Dispense: 180 tablet; Refill: 0    9. Nausea  -     ondansetron (Zofran) 4 MG tablet; Take 1 tablet by mouth Every 8 (Eight) Hours As Needed for Nausea or Vomiting.  Dispense: 20 tablet; Refill: 5           I spent 15 minutes caring for Kali on this date of service. This time includes time spent by me in the following activities:preparing for the visit, reviewing tests, performing a medically appropriate examination and/or evaluation , ordering medications, tests, or procedures, documenting information in the medical record, and independently interpreting results and communicating that information with the patient/family/caregiver  Follow Up   No follow-ups on  file.  Patient was given instructions and counseling regarding his condition or for health maintenance advice. Please see specific information pulled into the AVS if appropriate.

## 2024-05-21 ENCOUNTER — OFFICE VISIT (OUTPATIENT)
Dept: FAMILY MEDICINE CLINIC | Facility: CLINIC | Age: 79
End: 2024-05-21
Payer: MEDICARE

## 2024-05-21 VITALS
WEIGHT: 199 LBS | DIASTOLIC BLOOD PRESSURE: 78 MMHG | HEIGHT: 70 IN | TEMPERATURE: 98.1 F | BODY MASS INDEX: 28.49 KG/M2 | OXYGEN SATURATION: 96 % | SYSTOLIC BLOOD PRESSURE: 135 MMHG | RESPIRATION RATE: 16 BRPM | HEART RATE: 70 BPM

## 2024-05-21 DIAGNOSIS — F41.9 ANXIETY: Chronic | ICD-10-CM

## 2024-05-21 DIAGNOSIS — Z00.00 ENCOUNTER FOR SUBSEQUENT ANNUAL WELLNESS VISIT (AWV) IN MEDICARE PATIENT: Primary | ICD-10-CM

## 2024-05-21 DIAGNOSIS — N52.9 ERECTILE DYSFUNCTION, UNSPECIFIED ERECTILE DYSFUNCTION TYPE: Chronic | ICD-10-CM

## 2024-05-21 DIAGNOSIS — E03.9 ACQUIRED HYPOTHYROIDISM: Chronic | ICD-10-CM

## 2024-05-21 DIAGNOSIS — K21.9 GASTROESOPHAGEAL REFLUX DISEASE WITHOUT ESOPHAGITIS: Chronic | ICD-10-CM

## 2024-05-21 DIAGNOSIS — J30.1 CHRONIC SEASONAL ALLERGIC RHINITIS DUE TO POLLEN: Chronic | ICD-10-CM

## 2024-05-21 DIAGNOSIS — E78.2 MIXED HYPERLIPIDEMIA: Chronic | ICD-10-CM

## 2024-05-21 DIAGNOSIS — I10 BENIGN ESSENTIAL HTN: Chronic | ICD-10-CM

## 2024-05-21 DIAGNOSIS — R11.0 NAUSEA: ICD-10-CM

## 2024-05-21 RX ORDER — PANTOPRAZOLE SODIUM 40 MG/1
40 TABLET, DELAYED RELEASE ORAL DAILY
Qty: 90 TABLET | Refills: 1 | Status: SHIPPED | OUTPATIENT
Start: 2024-05-21

## 2024-05-21 RX ORDER — DIAZEPAM 2 MG/1
2 TABLET ORAL 2 TIMES DAILY PRN
Qty: 180 TABLET | Refills: 0 | Status: SHIPPED | OUTPATIENT
Start: 2024-05-21

## 2024-05-21 RX ORDER — FLUTICASONE PROPIONATE 50 MCG
2 SPRAY, SUSPENSION (ML) NASAL DAILY
Qty: 48 G | Refills: 1 | Status: SHIPPED | OUTPATIENT
Start: 2024-05-21

## 2024-05-21 RX ORDER — IRBESARTAN 300 MG/1
300 TABLET ORAL DAILY
Qty: 90 TABLET | Refills: 1 | Status: SHIPPED | OUTPATIENT
Start: 2024-05-21

## 2024-05-21 RX ORDER — AMLODIPINE BESYLATE 5 MG/1
5 TABLET ORAL DAILY
Qty: 90 TABLET | Refills: 1 | Status: SHIPPED | OUTPATIENT
Start: 2024-05-21

## 2024-05-21 RX ORDER — LEVOTHYROXINE SODIUM 0.03 MG/1
25 TABLET ORAL DAILY
Qty: 90 TABLET | Refills: 1 | Status: SHIPPED | OUTPATIENT
Start: 2024-05-21

## 2024-05-21 RX ORDER — SILDENAFIL 100 MG/1
100 TABLET, FILM COATED ORAL DAILY PRN
Qty: 10 TABLET | Refills: 11 | Status: CANCELLED | OUTPATIENT
Start: 2024-05-21

## 2024-05-21 RX ORDER — ONDANSETRON 4 MG/1
4 TABLET, FILM COATED ORAL EVERY 8 HOURS PRN
Qty: 20 TABLET | Refills: 5 | Status: SHIPPED | OUTPATIENT
Start: 2024-05-21

## 2024-05-21 RX ORDER — SIMVASTATIN 20 MG
20 TABLET ORAL EVERY EVENING
Qty: 90 TABLET | Refills: 1 | Status: SHIPPED | OUTPATIENT
Start: 2024-05-21

## 2024-05-21 NOTE — PATIENT INSTRUCTIONS
Medicare Wellness  Personal Prevention Plan of Service     Date of Office Visit:    Encounter Provider:  Nathan Arango MD  Place of Service:  [facility]  Patient Name: Kali Crespo  :  1945    As part of the Medicare Wellness portion of your visit today, we are providing you with this personalized preventive plan of services (PPPS). This plan is based upon recommendations of the [place] Preventive Services Task Force (USPSTF) and the Advisory Committee on Immunization Practices (ACIP).    This lists the preventive care services that should be considered, and provides dates of when you are due. Items listed as completed are up-to-date and do not require any further intervention.    Health Maintenance   Topic Date Due    COVID-19 Vaccine (2023-24 season) 08/10/2024 (Originally 2023)    RSV Vaccine - Adults (1 - 1-dose 60+ series) 2025 (Originally 2005)    INFLUENZA VACCINE  2024    PROSTATE CANCER SCREENING  2024    BMI FOLLOWUP  2025    LIPID PANEL  2025    COLORECTAL CANCER SCREENING  2025    ANNUAL WELLNESS VISIT  2025    Pneumococcal Vaccine 65+  Completed    ZOSTER VACCINE  Completed    HEPATITIS C SCREENING  Discontinued    TDAP/TD VACCINES  Discontinued       No orders of the defined types were placed in this encounter.      No follow-ups on file.

## 2024-08-20 NOTE — PROGRESS NOTES
Chief Complaint:   Chief Complaint   Patient presents with    Anxiety     Med refill   [facility] updated today   No labs   rachell Crespo 79 y.o. male who presents today for Medical Management of the below listed issues. He  has a problem list of   Patient Active Problem List   Diagnosis    Cervical pain    Migraine without aura and without status migrainosus, not intractable    Vertigo    Benign essential HTN    Acid reflux    Encounter for pre-operative cardiovascular clearance    Abnormal EKG    Hyperlipidemia    Erectile dysfunction    Prostate cancer    Meniere disease, bilateral    DDD (degenerative disc disease), lumbar    Chronic midline low back pain with left-sided sciatica    Malignant neoplastic disease    Alternating esotropia    Diplopia    Laceration of extensor muscle, fascia and tendon of right ring finger at wrist and hand level, initial encounter    Acquired hypothyroidism    Intractable low back pain    Opioid dependence    Spondylosis 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 myelopathy or radiculopathy    Lumbar radiculopathy    Bilateral stenosis of lateral recess of lumbar spine   .  Since the last visit, He has overall felt well.  he has been compliant with   Current Outpatient Medications:     diazePAM (VALIUM) 2 MG tablet, Take 1 tablet by mouth 2 (Two) Times a Day As Needed for Anxiety., Disp: 180 tablet, Rfl: 0    amLODIPine (NORVASC) 5 MG tablet, Take 1 tablet by mouth Daily., Disp: 90 tablet, Rfl: 1    EPINEPHrine (EPIPEN) 0.3 MG/0.3ML solution auto-injector injection, ADMINISTER 0.3 ML IN THE MUSCLE 1 TIME FOR 1 DOSE AS DIRECTED BY PRESCRIBER, Disp: , Rfl:     fluticasone (FLONASE) 50 MCG/ACT nasal spray, 2 sprays into the nostril(s) as directed by provider Daily., Disp: 48 g, Rfl: 1    gabapentin (NEURONTIN) 600 MG tablet, Take 1 tablet by mouth Daily., Disp: , Rfl:     HYDROcodone-acetaminophen (NORCO) 7.5-325 MG per tablet, , Disp: , Rfl:     irbesartan (AVAPRO) 300 MG tablet, Take  "1 tablet by mouth Daily., Disp: 90 tablet, Rfl: 1    levothyroxine (SYNTHROID, LEVOTHROID) 25 MCG tablet, Take 1 tablet by mouth Daily., Disp: 90 tablet, Rfl: 1    loratadine (CLARITIN) 10 MG tablet, Take 1 tablet by mouth Daily., Disp: , Rfl:     Multiple Vitamins-Minerals (CENTRAL-KEVON FOR SENIORS PO), Take 1 tablet by mouth Daily. HOLD PER MD INSTR, Disp: , Rfl:     naloxone (NARCAN) 4 MG/0.1ML nasal spray, CALL 911. SPR CONTENTS OF ONE SPRAYER (0.1ML) INTO ONE NOSTRIL. REPEAT IN 2-3 MIN IF SYMPTOMS OF OPIOID EMERGENCY PERSIST, ALTERNATE NOSTRILS, Disp: , Rfl:     Omega-3 Fatty Acids (FISH OIL) 1000 MG capsule capsule, Take 1 capsule by mouth Daily. HOLD PER MD INSTR, Disp: , Rfl:     ondansetron (Zofran) 4 MG tablet, Take 1 tablet by mouth Every 8 (Eight) Hours As Needed for Nausea or Vomiting., Disp: 20 tablet, Rfl: 5    pantoprazole (PROTONIX) 40 MG EC tablet, Take 1 tablet by mouth Daily., Disp: 90 tablet, Rfl: 1    sildenafil (Viagra) 100 MG tablet, Take 1 tablet by mouth Daily As Needed for Erectile Dysfunction., Disp: 10 tablet, Rfl: 11    simvastatin (ZOCOR) 20 MG tablet, Take 1 tablet by mouth Every Evening., Disp: 90 tablet, Rfl: 1    Thiamine HCl (VITAMIN B-1) 100 MG tablet, Take 5 tablets by mouth Daily. HOLD PER MD INSTR, Disp: , Rfl: .  He denies medication side effects.    All of the other chronic condition(s) listed above are stable w/o issues.    /88   Pulse 64   Temp 97.9 °F (36.6 °C) (Oral)   Resp 14   Ht 177.8 cm (70\")   Wt 89.4 kg (197 lb)   SpO2 99%   BMI 28.27 kg/m²     Results for orders placed or performed in visit on 04/16/24   Comprehensive metabolic panel    Specimen: Blood   Result Value Ref Range    Glucose 88 65 - 99 mg/dL    BUN 19 8 - 23 mg/dL    Creatinine 0.91 0.76 - 1.27 mg/dL    EGFR Result 86.3 >60.0 mL/min/1.73    BUN/Creatinine Ratio 20.9 7.0 - 25.0    Sodium 143 136 - 145 mmol/L    Potassium 4.5 3.5 - 5.2 mmol/L    Chloride 106 98 - 107 mmol/L    Total CO2 " 26.3 22.0 - 29.0 mmol/L    Calcium 9.1 8.6 - 10.5 mg/dL    Total Protein 5.7 (L) 6.0 - 8.5 g/dL    Albumin 4.2 3.5 - 5.2 g/dL    Globulin 1.5 gm/dL    A/G Ratio 2.8 g/dL    Total Bilirubin 0.7 0.0 - 1.2 mg/dL    Alkaline Phosphatase 90 39 - 117 U/L    AST (SGOT) 15 1 - 40 U/L    ALT (SGPT) 14 1 - 41 U/L   Lipid panel    Specimen: Blood   Result Value Ref Range    Total Cholesterol 162 0 - 200 mg/dL    Triglycerides 85 0 - 150 mg/dL    HDL Cholesterol 44 40 - 60 mg/dL    VLDL Cholesterol Farrukh 16 5 - 40 mg/dL    LDL Chol Calc (NIH) 102 (H) 0 - 100 mg/dL   TSH    Specimen: Blood   Result Value Ref Range    TSH 2.930 0.270 - 4.200 uIU/mL   T4, Free    Specimen: Blood   Result Value Ref Range    Free T4 1.38 0.93 - 1.70 ng/dL   CBC and Differential    Specimen: Blood   Result Value Ref Range    WBC 4.58 3.40 - 10.80 10*3/mm3    RBC 4.17 4.14 - 5.80 10*6/mm3    Hemoglobin 13.3 13.0 - 17.7 g/dL    Hematocrit 38.5 37.5 - 51.0 %    MCV 92.3 79.0 - 97.0 fL    MCH 31.9 26.6 - 33.0 pg    MCHC 34.5 31.5 - 35.7 g/dL    RDW 13.4 12.3 - 15.4 %    Platelets 207 140 - 450 10*3/mm3    Neutrophil Rel % 64.7 42.7 - 76.0 %    Lymphocyte Rel % 22.1 19.6 - 45.3 %    Monocyte Rel % 6.6 5.0 - 12.0 %    Eosinophil Rel % 5.7 0.3 - 6.2 %    Basophil Rel % 0.7 0.0 - 1.5 %    Neutrophils Absolute 2.97 1.70 - 7.00 10*3/mm3    Lymphocytes Absolute 1.01 0.70 - 3.10 10*3/mm3    Monocytes Absolute 0.30 0.10 - 0.90 10*3/mm3    Eosinophils Absolute 0.26 0.00 - 0.40 10*3/mm3    Basophils Absolute 0.03 0.00 - 0.20 10*3/mm3    Immature Granulocyte Rel % 0.2 0.0 - 0.5 %    Immature Grans Absolute 0.01 0.00 - 0.05 10*3/mm3    nRBC 0.0 0.0 - 0.2 /100 WBC             The following portions of the patient's history were reviewed and updated as appropriate: allergies, current medications, past family history, past medical history, past social history, past surgical history, and problem list.    Review of Systems   Constitutional:  Negative for activity change,  chills and fever.   Respiratory:  Negative for cough.    Cardiovascular:  Negative for chest pain.   Psychiatric/Behavioral:  Negative for dysphoric mood.        Objective             Physical Exam  Vitals and nursing note reviewed.   Constitutional:       General: He is not in acute distress.     Appearance: He is well-developed.   Cardiovascular:      Rate and Rhythm: Normal rate and regular rhythm.   Pulmonary:      Effort: Pulmonary effort is normal.      Breath sounds: Normal breath sounds.   Neurological:      Mental Status: He is alert and oriented to person, place, and time.   Psychiatric:         Behavior: Behavior normal.         Thought Content: Thought content normal.             Diagnoses and all orders for this visit:    1. Anxiety  -     diazePAM (VALIUM) 2 MG tablet; Take 1 tablet by mouth 2 (Two) Times a Day As Needed for Anxiety.  Dispense: 180 tablet; Refill: 0

## 2024-08-21 ENCOUNTER — OFFICE VISIT (OUTPATIENT)
Dept: FAMILY MEDICINE CLINIC | Facility: CLINIC | Age: 79
End: 2024-08-21
Payer: MEDICARE

## 2024-08-21 VITALS
DIASTOLIC BLOOD PRESSURE: 88 MMHG | RESPIRATION RATE: 14 BRPM | HEART RATE: 64 BPM | OXYGEN SATURATION: 99 % | HEIGHT: 70 IN | SYSTOLIC BLOOD PRESSURE: 146 MMHG | BODY MASS INDEX: 28.2 KG/M2 | TEMPERATURE: 97.9 F | WEIGHT: 197 LBS

## 2024-08-21 DIAGNOSIS — F41.9 ANXIETY: Chronic | ICD-10-CM

## 2024-08-21 PROCEDURE — 1126F AMNT PAIN NOTED NONE PRSNT: CPT | Performed by: FAMILY MEDICINE

## 2024-08-21 PROCEDURE — 3079F DIAST BP 80-89 MM HG: CPT | Performed by: FAMILY MEDICINE

## 2024-08-21 PROCEDURE — 1159F MED LIST DOCD IN RCRD: CPT | Performed by: FAMILY MEDICINE

## 2024-08-21 PROCEDURE — 3077F SYST BP >= 140 MM HG: CPT | Performed by: FAMILY MEDICINE

## 2024-08-21 PROCEDURE — 1160F RVW MEDS BY RX/DR IN RCRD: CPT | Performed by: FAMILY MEDICINE

## 2024-08-21 PROCEDURE — 99213 OFFICE O/P EST LOW 20 MIN: CPT | Performed by: FAMILY MEDICINE

## 2024-08-21 RX ORDER — DIAZEPAM 2 MG/1
2 TABLET ORAL 2 TIMES DAILY PRN
Qty: 180 TABLET | Refills: 0 | Status: SHIPPED | OUTPATIENT
Start: 2024-08-21

## 2024-09-23 DIAGNOSIS — K21.9 GASTROESOPHAGEAL REFLUX DISEASE WITHOUT ESOPHAGITIS: Chronic | ICD-10-CM

## 2024-09-23 DIAGNOSIS — E78.2 MIXED HYPERLIPIDEMIA: Chronic | ICD-10-CM

## 2024-09-23 DIAGNOSIS — I10 BENIGN ESSENTIAL HTN: Chronic | ICD-10-CM

## 2024-09-23 DIAGNOSIS — E03.9 ACQUIRED HYPOTHYROIDISM: Chronic | ICD-10-CM

## 2024-11-12 RX ORDER — PANTOPRAZOLE SODIUM 40 MG/1
40 TABLET, DELAYED RELEASE ORAL DAILY
Qty: 90 TABLET | Refills: 3 | OUTPATIENT
Start: 2024-11-12

## 2024-11-12 RX ORDER — AMLODIPINE BESYLATE 5 MG/1
5 TABLET ORAL DAILY
Qty: 90 TABLET | Refills: 3 | OUTPATIENT
Start: 2024-11-12

## 2024-11-12 RX ORDER — IRBESARTAN 300 MG/1
300 TABLET ORAL DAILY
Qty: 90 TABLET | Refills: 3 | OUTPATIENT
Start: 2024-11-12

## 2024-11-12 RX ORDER — LEVOTHYROXINE SODIUM 25 UG/1
25 TABLET ORAL DAILY
Qty: 90 TABLET | Refills: 3 | OUTPATIENT
Start: 2024-11-12

## 2024-11-12 RX ORDER — SIMVASTATIN 20 MG
20 TABLET ORAL EVERY EVENING
Qty: 90 TABLET | Refills: 3 | OUTPATIENT
Start: 2024-11-12

## 2024-11-12 NOTE — PROGRESS NOTES
Chief Complaint:   Chief Complaint   Patient presents with    Hypertension     Med refill due    Hyperlipidemia    Hypothyroidism    Anxiety    Arthritis       Kali Crespo 79 y.o. male who presents today for Medical Management of the below listed issues. He  has a problem list of   Patient Active Problem List   Diagnosis    Cervical pain    Migraine without aura and without status migrainosus, not intractable    Vertigo    Benign essential HTN    Acid reflux    Encounter for pre-operative cardiovascular clearance    Abnormal EKG    Hyperlipidemia    Erectile dysfunction    Prostate cancer    Meniere disease, bilateral    DDD (degenerative disc disease), lumbar    Chronic midline low back pain with left-sided sciatica    Malignant neoplastic disease    Alternating esotropia    Diplopia    Laceration of extensor muscle, fascia and tendon of right ring finger at wrist and hand level, initial encounter    Acquired hypothyroidism    Intractable low back pain    Opioid dependence    Spondylosis 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 myelopathy or radiculopathy    Lumbar radiculopathy    Bilateral stenosis of lateral recess of lumbar spine   .  Since the last visit, He has overall felt well.  he has been compliant with   Current Outpatient Medications:     amLODIPine (NORVASC) 5 MG tablet, Take 1 tablet by mouth Daily., Disp: 90 tablet, Rfl: 1    diazePAM (VALIUM) 2 MG tablet, Take 1 tablet by mouth 2 (Two) Times a Day As Needed for Anxiety., Disp: 180 tablet, Rfl: 0    fluticasone (FLONASE) 50 MCG/ACT nasal spray, Administer 2 sprays into the nostril(s) as directed by provider Daily., Disp: 48 g, Rfl: 1    irbesartan (AVAPRO) 300 MG tablet, Take 1 tablet by mouth Daily., Disp: 90 tablet, Rfl: 1    levothyroxine (SYNTHROID, LEVOTHROID) 25 MCG tablet, Take 1 tablet by mouth Daily., Disp: 90 tablet, Rfl: 1    ondansetron (Zofran) 4 MG tablet, Take 1 tablet by mouth Every 8 (Eight) Hours As Needed for Nausea or Vomiting., Disp: 20  "tablet, Rfl: 5    pantoprazole (PROTONIX) 40 MG EC tablet, Take 1 tablet by mouth Daily., Disp: 90 tablet, Rfl: 1    simvastatin (ZOCOR) 20 MG tablet, Take 1 tablet by mouth Every Evening., Disp: 90 tablet, Rfl: 1    EPINEPHrine (EPIPEN) 0.3 MG/0.3ML solution auto-injector injection, ADMINISTER 0.3 ML IN THE MUSCLE 1 TIME FOR 1 DOSE AS DIRECTED BY PRESCRIBER, Disp: , Rfl:     gabapentin (NEURONTIN) 600 MG tablet, Take 1 tablet by mouth Daily., Disp: , Rfl:     HYDROcodone-acetaminophen (NORCO) 7.5-325 MG per tablet, , Disp: , Rfl:     loratadine (CLARITIN) 10 MG tablet, Take 1 tablet by mouth Daily., Disp: , Rfl:     Multiple Vitamins-Minerals (CENTRAL-KEVON FOR SENIORS PO), Take 1 tablet by mouth Daily. HOLD PER MD INSTR, Disp: , Rfl:     naloxone (NARCAN) 4 MG/0.1ML nasal spray, CALL 911. SPR CONTENTS OF ONE SPRAYER (0.1ML) INTO ONE NOSTRIL. REPEAT IN 2-3 MIN IF SYMPTOMS OF OPIOID EMERGENCY PERSIST, ALTERNATE NOSTRILS, Disp: , Rfl:     Omega-3 Fatty Acids (FISH OIL) 1000 MG capsule capsule, Take 1 capsule by mouth Daily. HOLD PER MD INSTR, Disp: , Rfl:     sildenafil (Viagra) 100 MG tablet, Take 1 tablet by mouth Daily As Needed for Erectile Dysfunction., Disp: 10 tablet, Rfl: 11    Thiamine HCl (VITAMIN B-1) 100 MG tablet, Take 5 tablets by mouth Daily. HOLD PER MD INSTR, Disp: , Rfl: .  He denies medication side effects.    All of the other chronic condition(s) listed above are stable w/o issues.    /78   Pulse 66   Temp 97.9 °F (36.6 °C) (Oral)   Resp 14   Ht 177.8 cm (70\")   Wt 90.7 kg (200 lb)   SpO2 99%   BMI 28.70 kg/m²     Results for orders placed or performed in visit on 10/21/24   T4, Free    Collection Time: 11/04/24  9:00 AM    Specimen: Blood   Result Value Ref Range    Free T4 1.29 0.92 - 1.68 ng/dL   TSH    Collection Time: 11/04/24  9:00 AM    Specimen: Blood   Result Value Ref Range    TSH 3.030 0.270 - 4.200 uIU/mL   Lipid Panel    Collection Time: 11/04/24  9:00 AM    Specimen: " Blood   Result Value Ref Range    Total Cholesterol 187 0 - 200 mg/dL    Triglycerides 93 0 - 150 mg/dL    HDL Cholesterol 45 40 - 60 mg/dL    VLDL Cholesterol Farrukh 17 5 - 40 mg/dL    LDL Chol Calc ([facility]) 125 (H) 0 - 100 mg/dL   Comprehensive Metabolic Panel    Collection Time: 11/04/24  9:00 AM    Specimen: Blood   Result Value Ref Range    Glucose 93 65 - 99 mg/dL    BUN 14 8 - 23 mg/dL    Creatinine 0.94 0.76 - 1.27 mg/dL    EGFR Result 82.5 >60.0 mL/min/1.73    BUN/Creatinine Ratio 14.9 7.0 - 25.0    Sodium 141 136 - 145 mmol/L    Potassium 4.6 3.5 - 5.2 mmol/L    Chloride 104 98 - 107 mmol/L    Total CO2 28.1 22.0 - 29.0 mmol/L    Calcium 9.2 8.6 - 10.5 mg/dL    Total Protein 6.1 6.0 - 8.5 g/dL    Albumin 4.1 3.5 - 5.2 g/dL    Globulin 2.0 gm/dL    A/G Ratio 2.1 g/dL    Total Bilirubin 0.5 0.0 - 1.2 mg/dL    Alkaline Phosphatase 115 39 - 117 U/L    AST (SGOT) 16 1 - 40 U/L    ALT (SGPT) 13 1 - 41 U/L   CBC & Differential    Collection Time: 11/04/24  9:00 AM    Specimen: Blood   Result Value Ref Range    WBC 5.11 3.40 - 10.80 10*3/mm3    RBC 4.42 4.14 - 5.80 10*6/mm3    Hemoglobin 13.7 13.0 - 17.7 g/dL    Hematocrit 41.0 37.5 - 51.0 %    MCV 92.8 79.0 - 97.0 fL    MCH 31.0 26.6 - 33.0 pg    MCHC 33.4 31.5 - 35.7 g/dL    RDW 12.5 12.3 - 15.4 %    Platelets 240 140 - 450 10*3/mm3    Neutrophil Rel % 65.9 42.7 - 76.0 %    Lymphocyte Rel % 22.9 19.6 - 45.3 %    Monocyte Rel % 6.3 5.0 - 12.0 %    Eosinophil Rel % 3.9 0.3 - 6.2 %    Basophil Rel % 0.8 0.0 - 1.5 %    Neutrophils Absolute 3.37 1.70 - 7.00 10*3/mm3    Lymphocytes Absolute 1.17 0.70 - 3.10 10*3/mm3    Monocytes Absolute 0.32 0.10 - 0.90 10*3/mm3    Eosinophils Absolute 0.20 0.00 - 0.40 10*3/mm3    Basophils Absolute 0.04 0.00 - 0.20 10*3/mm3    Immature Granulocyte Rel % 0.2 0.0 - 0.5 %    Immature Grans Absolute 0.01 0.00 - 0.05 10*3/mm3    nRBC 0.0 0.0 - 0.2 /100 WBC             The following portions of the patient's history were reviewed and updated as  appropriate: allergies, current medications, past family history, past medical history, past social history, past surgical history, and problem list.    Review of Systems   Constitutional:  Negative for activity change, chills and fever.   Respiratory:  Negative for cough.    Cardiovascular:  Negative for chest pain.   Psychiatric/Behavioral:  Negative for dysphoric mood.        Objective             Physical Exam  Vitals and nursing note reviewed.   Constitutional:       General: He is not in acute distress.     Appearance: He is well-developed.   Cardiovascular:      Rate and Rhythm: Normal rate and regular rhythm.   Pulmonary:      Effort: Pulmonary effort is normal.      Breath sounds: Normal breath sounds.   Neurological:      Mental Status: He is alert and oriented to person, place, and time.   Psychiatric:         Behavior: Behavior normal.         Thought Content: Thought content normal.     Labs reviewed with pt today during visit. All questions answered.          Diagnoses and all orders for this visit:    1. Benign essential HTN (Primary)  -     amLODIPine (NORVASC) 5 MG tablet; Take 1 tablet by mouth Daily.  Dispense: 90 tablet; Refill: 1  -     irbesartan (AVAPRO) 300 MG tablet; Take 1 tablet by mouth Daily.  Dispense: 90 tablet; Refill: 1  -     Comprehensive metabolic panel; Future  -     Lipid panel; Future  -     CBC and Differential; Future    2. Anxiety  -     diazePAM (VALIUM) 2 MG tablet; Take 1 tablet by mouth 2 (Two) Times a Day As Needed for Anxiety.  Dispense: 180 tablet; Refill: 0    3. Chronic seasonal allergic rhinitis due to pollen  -     fluticasone (FLONASE) 50 MCG/ACT nasal spray; Administer 2 sprays into the nostril(s) as directed by provider Daily.  Dispense: 48 g; Refill: 1    4. Acquired hypothyroidism  -     levothyroxine (SYNTHROID, LEVOTHROID) 25 MCG tablet; Take 1 tablet by mouth Daily.  Dispense: 90 tablet; Refill: 1  -     TSH; Future  -     T4, Free; Future    5.  Gastroesophageal reflux disease without esophagitis  -     pantoprazole (PROTONIX) 40 MG EC tablet; Take 1 tablet by mouth Daily.  Dispense: 90 tablet; Refill: 1    6. Mixed hyperlipidemia  -     simvastatin (ZOCOR) 20 MG tablet; Take 1 tablet by mouth Every Evening.  Dispense: 90 tablet; Refill: 1    7. Special screening for malignant neoplasm of prostate  -     PSA; Future    8. Nausea  -     ondansetron (Zofran) 4 MG tablet; Take 1 tablet by mouth Every 8 (Eight) Hours As Needed for Nausea or Vomiting.  Dispense: 20 tablet; Refill: 5

## 2024-11-13 ENCOUNTER — OFFICE VISIT (OUTPATIENT)
Dept: FAMILY MEDICINE CLINIC | Facility: CLINIC | Age: 79
End: 2024-11-13
Payer: MEDICARE

## 2024-11-13 ENCOUNTER — PRIOR AUTHORIZATION (OUTPATIENT)
Dept: FAMILY MEDICINE CLINIC | Facility: CLINIC | Age: 79
End: 2024-11-13

## 2024-11-13 VITALS
HEIGHT: 70 IN | SYSTOLIC BLOOD PRESSURE: 128 MMHG | TEMPERATURE: 97.9 F | DIASTOLIC BLOOD PRESSURE: 78 MMHG | HEART RATE: 66 BPM | OXYGEN SATURATION: 99 % | BODY MASS INDEX: 28.63 KG/M2 | RESPIRATION RATE: 14 BRPM | WEIGHT: 200 LBS

## 2024-11-13 DIAGNOSIS — K21.9 GASTROESOPHAGEAL REFLUX DISEASE WITHOUT ESOPHAGITIS: Chronic | ICD-10-CM

## 2024-11-13 DIAGNOSIS — J30.1 CHRONIC SEASONAL ALLERGIC RHINITIS DUE TO POLLEN: Chronic | ICD-10-CM

## 2024-11-13 DIAGNOSIS — E78.2 MIXED HYPERLIPIDEMIA: Chronic | ICD-10-CM

## 2024-11-13 DIAGNOSIS — I10 BENIGN ESSENTIAL HTN: Primary | Chronic | ICD-10-CM

## 2024-11-13 DIAGNOSIS — Z12.5 SPECIAL SCREENING FOR MALIGNANT NEOPLASM OF PROSTATE: ICD-10-CM

## 2024-11-13 DIAGNOSIS — F41.9 ANXIETY: Chronic | ICD-10-CM

## 2024-11-13 DIAGNOSIS — E03.9 ACQUIRED HYPOTHYROIDISM: Chronic | ICD-10-CM

## 2024-11-13 DIAGNOSIS — R11.0 NAUSEA: ICD-10-CM

## 2024-11-13 PROCEDURE — 1126F AMNT PAIN NOTED NONE PRSNT: CPT | Performed by: FAMILY MEDICINE

## 2024-11-13 PROCEDURE — 3074F SYST BP LT 130 MM HG: CPT | Performed by: FAMILY MEDICINE

## 2024-11-13 PROCEDURE — 1159F MED LIST DOCD IN RCRD: CPT | Performed by: FAMILY MEDICINE

## 2024-11-13 PROCEDURE — 1160F RVW MEDS BY RX/DR IN RCRD: CPT | Performed by: FAMILY MEDICINE

## 2024-11-13 PROCEDURE — 99214 OFFICE O/P EST MOD 30 MIN: CPT | Performed by: FAMILY MEDICINE

## 2024-11-13 PROCEDURE — 3078F DIAST BP <80 MM HG: CPT | Performed by: FAMILY MEDICINE

## 2024-11-13 RX ORDER — DIAZEPAM 2 MG/1
2 TABLET ORAL 2 TIMES DAILY PRN
Qty: 180 TABLET | Refills: 0 | Status: SHIPPED | OUTPATIENT
Start: 2024-11-13

## 2024-11-13 RX ORDER — FLUTICASONE PROPIONATE 50 MCG
2 SPRAY, SUSPENSION (ML) NASAL DAILY
Qty: 48 G | Refills: 1 | Status: SHIPPED | OUTPATIENT
Start: 2024-11-13

## 2024-11-13 RX ORDER — ONDANSETRON 4 MG/1
4 TABLET, FILM COATED ORAL EVERY 8 HOURS PRN
Qty: 20 TABLET | Refills: 5 | Status: SHIPPED | OUTPATIENT
Start: 2024-11-13

## 2024-11-13 RX ORDER — PANTOPRAZOLE SODIUM 40 MG/1
40 TABLET, DELAYED RELEASE ORAL DAILY
Qty: 90 TABLET | Refills: 1 | Status: SHIPPED | OUTPATIENT
Start: 2024-11-13

## 2024-11-13 RX ORDER — LEVOTHYROXINE SODIUM 25 UG/1
25 TABLET ORAL DAILY
Qty: 90 TABLET | Refills: 1 | Status: SHIPPED | OUTPATIENT
Start: 2024-11-13

## 2024-11-13 RX ORDER — SIMVASTATIN 20 MG
20 TABLET ORAL EVERY EVENING
Qty: 90 TABLET | Refills: 1 | Status: SHIPPED | OUTPATIENT
Start: 2024-11-13

## 2024-11-13 RX ORDER — AMLODIPINE BESYLATE 5 MG/1
5 TABLET ORAL DAILY
Qty: 90 TABLET | Refills: 1 | Status: SHIPPED | OUTPATIENT
Start: 2024-11-13

## 2024-11-13 RX ORDER — IRBESARTAN 300 MG/1
300 TABLET ORAL DAILY
Qty: 90 TABLET | Refills: 1 | Status: SHIPPED | OUTPATIENT
Start: 2024-11-13

## 2025-02-12 NOTE — PROGRESS NOTES
Chief Complaint:   Chief Complaint   Patient presents with    Anxiety     Med refill / csa up to date  [facility]  / CSA UP TO DATE        Kali Crespo 79 y.o. male who presents today for Medical Management of the below listed issues. He  has a problem list of   Patient Active Problem List   Diagnosis    Cervical pain    Migraine without aura and without status migrainosus, not intractable    Vertigo    Benign essential HTN    Acid reflux    Encounter for pre-operative cardiovascular clearance    Abnormal EKG    Hyperlipidemia    Erectile dysfunction    Prostate cancer    Meniere disease, bilateral    DDD (degenerative disc disease), lumbar    Chronic midline low back pain with left-sided sciatica    Malignant neoplastic disease    Alternating esotropia    Diplopia    Laceration of extensor muscle, fascia and tendon of right ring finger at wrist and hand level, initial encounter    Acquired hypothyroidism    Intractable low back pain    Opioid dependence    Spondylosis 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 myelopathy or radiculopathy    Lumbar radiculopathy    Bilateral stenosis of lateral recess of lumbar spine   .  Since the last visit, He has overall felt well. BPs up today due to family stressors. he has been compliant with   Current Outpatient Medications:     diazePAM (VALIUM) 2 MG tablet, Take 1 tablet by mouth 2 (Two) Times a Day As Needed for Anxiety., Disp: 180 tablet, Rfl: 0    amLODIPine (NORVASC) 5 MG tablet, Take 1 tablet by mouth Daily., Disp: 90 tablet, Rfl: 1    EPINEPHrine (EPIPEN) 0.3 MG/0.3ML solution auto-injector injection, ADMINISTER 0.3 ML IN THE MUSCLE 1 TIME FOR 1 DOSE AS DIRECTED BY PRESCRIBER, Disp: , Rfl:     fluticasone (FLONASE) 50 MCG/ACT nasal spray, Administer 2 sprays into the nostril(s) as directed by provider Daily., Disp: 48 g, Rfl: 1    gabapentin (NEURONTIN) 600 MG tablet, Take 1 tablet by mouth Daily., Disp: , Rfl:     HYDROcodone-acetaminophen (NORCO) 7.5-325 MG per tablet,  ", Disp: , Rfl:     irbesartan (AVAPRO) 300 MG tablet, Take 1 tablet by mouth Daily., Disp: 90 tablet, Rfl: 1    levothyroxine (SYNTHROID, LEVOTHROID) 25 MCG tablet, Take 1 tablet by mouth Daily., Disp: 90 tablet, Rfl: 1    loratadine (CLARITIN) 10 MG tablet, Take 1 tablet by mouth Daily., Disp: , Rfl:     Multiple Vitamins-Minerals (CENTRAL-KEVON FOR SENIORS PO), Take 1 tablet by mouth Daily. HOLD PER MD INSTR, Disp: , Rfl:     naloxone (NARCAN) 4 MG/0.1ML nasal spray, CALL 911. SPR CONTENTS OF ONE SPRAYER (0.1ML) INTO ONE NOSTRIL. REPEAT IN 2-3 MIN IF SYMPTOMS OF OPIOID EMERGENCY PERSIST, ALTERNATE NOSTRILS, Disp: , Rfl:     Omega-3 Fatty Acids (FISH OIL) 1000 MG capsule capsule, Take 1 capsule by mouth Daily. HOLD PER MD INSTR, Disp: , Rfl:     ondansetron (Zofran) 4 MG tablet, Take 1 tablet by mouth Every 8 (Eight) Hours As Needed for Nausea or Vomiting., Disp: 20 tablet, Rfl: 5    pantoprazole (PROTONIX) 40 MG EC tablet, Take 1 tablet by mouth Daily., Disp: 90 tablet, Rfl: 1    sildenafil (Viagra) 100 MG tablet, Take 1 tablet by mouth Daily As Needed for Erectile Dysfunction., Disp: 10 tablet, Rfl: 11    simvastatin (ZOCOR) 20 MG tablet, Take 1 tablet by mouth Every Evening., Disp: 90 tablet, Rfl: 1    Thiamine HCl (VITAMIN B-1) 100 MG tablet, Take 5 tablets by mouth Daily. HOLD PER MD INSTR, Disp: , Rfl: .  He denies medication side effects.    All of the other chronic condition(s) listed above are stable w/o issues.    /92   Pulse 82   Temp 97.7 °F (36.5 °C) (Oral)   Resp 16   Ht 177.8 cm (70\")   Wt 89.8 kg (198 lb)   SpO2 99%   BMI 28.41 kg/m²     Results for orders placed or performed in visit on 10/21/24   T4, Free    Collection Time: 11/04/24  9:00 AM    Specimen: Blood   Result Value Ref Range    Free T4 1.29 0.92 - 1.68 ng/dL   TSH    Collection Time: 11/04/24  9:00 AM    Specimen: Blood   Result Value Ref Range    TSH 3.030 0.270 - 4.200 uIU/mL   Lipid Panel    Collection Time: 11/04/24  " 9:00 AM    Specimen: Blood   Result Value Ref Range    Total Cholesterol 187 0 - 200 mg/dL    Triglycerides 93 0 - 150 mg/dL    HDL Cholesterol 45 40 - 60 mg/dL    VLDL Cholesterol Farrukh 17 5 - 40 mg/dL    LDL Chol Calc ([facility]) 125 (H) 0 - 100 mg/dL   Comprehensive Metabolic Panel    Collection Time: 11/04/24  9:00 AM    Specimen: Blood   Result Value Ref Range    Glucose 93 65 - 99 mg/dL    BUN 14 8 - 23 mg/dL    Creatinine 0.94 0.76 - 1.27 mg/dL    EGFR Result 82.5 >60.0 mL/min/1.73    BUN/Creatinine Ratio 14.9 7.0 - 25.0    Sodium 141 136 - 145 mmol/L    Potassium 4.6 3.5 - 5.2 mmol/L    Chloride 104 98 - 107 mmol/L    Total CO2 28.1 22.0 - 29.0 mmol/L    Calcium 9.2 8.6 - 10.5 mg/dL    Total Protein 6.1 6.0 - 8.5 g/dL    Albumin 4.1 3.5 - 5.2 g/dL    Globulin 2.0 gm/dL    A/G Ratio 2.1 g/dL    Total Bilirubin 0.5 0.0 - 1.2 mg/dL    Alkaline Phosphatase 115 39 - 117 U/L    AST (SGOT) 16 1 - 40 U/L    ALT (SGPT) 13 1 - 41 U/L   CBC & Differential    Collection Time: 11/04/24  9:00 AM    Specimen: Blood   Result Value Ref Range    WBC 5.11 3.40 - 10.80 10*3/mm3    RBC 4.42 4.14 - 5.80 10*6/mm3    Hemoglobin 13.7 13.0 - 17.7 g/dL    Hematocrit 41.0 37.5 - 51.0 %    MCV 92.8 79.0 - 97.0 fL    MCH 31.0 26.6 - 33.0 pg    MCHC 33.4 31.5 - 35.7 g/dL    RDW 12.5 12.3 - 15.4 %    Platelets 240 140 - 450 10*3/mm3    Neutrophil Rel % 65.9 42.7 - 76.0 %    Lymphocyte Rel % 22.9 19.6 - 45.3 %    Monocyte Rel % 6.3 5.0 - 12.0 %    Eosinophil Rel % 3.9 0.3 - 6.2 %    Basophil Rel % 0.8 0.0 - 1.5 %    Neutrophils Absolute 3.37 1.70 - 7.00 10*3/mm3    Lymphocytes Absolute 1.17 0.70 - 3.10 10*3/mm3    Monocytes Absolute 0.32 0.10 - 0.90 10*3/mm3    Eosinophils Absolute 0.20 0.00 - 0.40 10*3/mm3    Basophils Absolute 0.04 0.00 - 0.20 10*3/mm3    Immature Granulocyte Rel % 0.2 0.0 - 0.5 %    Immature Grans Absolute 0.01 0.00 - 0.05 10*3/mm3    nRBC 0.0 0.0 - 0.2 /100 WBC             The following portions of the patient's history were  reviewed and updated as appropriate: allergies, current medications, past family history, past medical history, past social history, past surgical history, and problem list.    Review of Systems   Constitutional:  Negative for activity change, chills and fever.   Respiratory:  Negative for cough.    Cardiovascular:  Negative for chest pain.   Psychiatric/Behavioral:  Negative for dysphoric mood.        Objective             Physical Exam  Vitals and nursing note reviewed.   Constitutional:       General: He is not in acute distress.     Appearance: He is well-developed.   Cardiovascular:      Rate and Rhythm: Normal rate and regular rhythm.   Pulmonary:      Effort: Pulmonary effort is normal.      Breath sounds: Normal breath sounds.   Neurological:      Mental Status: He is alert and oriented to person, place, and time.   Psychiatric:         Behavior: Behavior normal.         Thought Content: Thought content normal.             Diagnoses and all orders for this visit:    1. Anxiety  -     diazePAM (VALIUM) 2 MG tablet; Take 1 tablet by mouth 2 (Two) Times a Day As Needed for Anxiety.  Dispense: 180 tablet; Refill: 0

## 2025-02-13 ENCOUNTER — OFFICE VISIT (OUTPATIENT)
Dept: FAMILY MEDICINE CLINIC | Facility: CLINIC | Age: 80
End: 2025-02-13
Payer: MEDICARE

## 2025-02-13 VITALS
SYSTOLIC BLOOD PRESSURE: 166 MMHG | HEART RATE: 82 BPM | BODY MASS INDEX: 28.35 KG/M2 | OXYGEN SATURATION: 99 % | HEIGHT: 70 IN | WEIGHT: 198 LBS | RESPIRATION RATE: 16 BRPM | TEMPERATURE: 97.7 F | DIASTOLIC BLOOD PRESSURE: 92 MMHG

## 2025-02-13 DIAGNOSIS — F41.9 ANXIETY: Chronic | ICD-10-CM

## 2025-02-13 PROCEDURE — 99213 OFFICE O/P EST LOW 20 MIN: CPT | Performed by: FAMILY MEDICINE

## 2025-02-13 PROCEDURE — 3077F SYST BP >= 140 MM HG: CPT | Performed by: FAMILY MEDICINE

## 2025-02-13 PROCEDURE — 1126F AMNT PAIN NOTED NONE PRSNT: CPT | Performed by: FAMILY MEDICINE

## 2025-02-13 PROCEDURE — 1159F MED LIST DOCD IN RCRD: CPT | Performed by: FAMILY MEDICINE

## 2025-02-13 PROCEDURE — 3080F DIAST BP >= 90 MM HG: CPT | Performed by: FAMILY MEDICINE

## 2025-02-13 PROCEDURE — 1160F RVW MEDS BY RX/DR IN RCRD: CPT | Performed by: FAMILY MEDICINE

## 2025-02-13 RX ORDER — DIAZEPAM 2 MG/1
2 TABLET ORAL 2 TIMES DAILY PRN
Qty: 180 TABLET | Refills: 0 | Status: SHIPPED | OUTPATIENT
Start: 2025-02-13

## 2025-02-23 ENCOUNTER — APPOINTMENT (OUTPATIENT)
Dept: GENERAL RADIOLOGY | Facility: HOSPITAL | Age: 80
End: 2025-02-23
Payer: MEDICARE

## 2025-02-23 ENCOUNTER — HOSPITAL ENCOUNTER (EMERGENCY)
Facility: HOSPITAL | Age: 80
Discharge: HOME | End: 2025-02-23
Attending: EMERGENCY MEDICINE | Admitting: EMERGENCY MEDICINE
Payer: MEDICARE

## 2025-02-23 ENCOUNTER — APPOINTMENT (OUTPATIENT)
Dept: ULTRASOUND IMAGING | Facility: HOSPITAL | Age: 80
End: 2025-02-23
Payer: MEDICARE

## 2025-02-23 VITALS
SYSTOLIC BLOOD PRESSURE: 142 MMHG | TEMPERATURE: 97.9 F | RESPIRATION RATE: 16 BRPM | DIASTOLIC BLOOD PRESSURE: 72 MMHG | BODY MASS INDEX: 27.63 KG/M2 | WEIGHT: 193 LBS | HEIGHT: 70 IN | OXYGEN SATURATION: 96 % | HEART RATE: 60 BPM

## 2025-02-23 DIAGNOSIS — L03.116 CELLULITIS OF LEFT LOWER EXTREMITY: Primary | ICD-10-CM

## 2025-02-23 LAB
ALBUMIN SERPL-MCNC: 4 G/DL (ref 3.5–5.2)
ALBUMIN/GLOB SERPL: 2.1 G/DL
ALP SERPL-CCNC: 91 U/L (ref 39–117)
ALT SERPL W P-5'-P-CCNC: 9 U/L (ref 1–41)
ANION GAP SERPL CALCULATED.3IONS-SCNC: 7.7 MMOL/L (ref 5–15)
AST SERPL-CCNC: 12 U/L (ref 1–40)
BASOPHILS # BLD AUTO: 0.04 10*3/MM3 (ref 0–0.2)
BASOPHILS NFR BLD AUTO: 0.7 % (ref 0–1.5)
BILIRUB SERPL-MCNC: 0.5 MG/DL (ref 0–1.2)
BUN SERPL-MCNC: 18 MG/DL (ref 8–23)
BUN/CREAT SERPL: 21.4 (ref 7–25)
CALCIUM SPEC-SCNC: 8.8 MG/DL (ref 8.6–10.5)
CHLORIDE SERPL-SCNC: 106 MMOL/L (ref 98–107)
CO2 SERPL-SCNC: 26.3 MMOL/L (ref 22–29)
CREAT SERPL-MCNC: 0.84 MG/DL (ref 0.76–1.27)
DEPRECATED RDW RBC AUTO: 43.5 FL (ref 37–54)
EGFRCR SERPLBLD CKD-EPI 2021: 88.7 ML/MIN/1.73
EOSINOPHIL # BLD AUTO: 0.12 10*3/MM3 (ref 0–0.4)
EOSINOPHIL NFR BLD AUTO: 2 % (ref 0.3–6.2)
ERYTHROCYTE [DISTWIDTH] IN BLOOD BY AUTOMATED COUNT: 12.8 % (ref 12.3–15.4)
GLOBULIN UR ELPH-MCNC: 1.9 GM/DL
GLUCOSE SERPL-MCNC: 98 MG/DL (ref 65–99)
HCT VFR BLD AUTO: 39.5 % (ref 37.5–51)
HGB BLD-MCNC: 13.2 G/DL (ref 13–17.7)
IMM GRANULOCYTES # BLD AUTO: 0.02 10*3/MM3 (ref 0–0.05)
IMM GRANULOCYTES NFR BLD AUTO: 0.3 % (ref 0–0.5)
LYMPHOCYTES # BLD AUTO: 0.74 10*3/MM3 (ref 0.7–3.1)
LYMPHOCYTES NFR BLD AUTO: 12.6 % (ref 19.6–45.3)
MCH RBC QN AUTO: 31.1 PG (ref 26.6–33)
MCHC RBC AUTO-ENTMCNC: 33.4 G/DL (ref 31.5–35.7)
MCV RBC AUTO: 92.9 FL (ref 79–97)
MONOCYTES # BLD AUTO: 0.25 10*3/MM3 (ref 0.1–0.9)
MONOCYTES NFR BLD AUTO: 4.3 % (ref 5–12)
NEUTROPHILS NFR BLD AUTO: 4.69 10*3/MM3 (ref 1.7–7)
NEUTROPHILS NFR BLD AUTO: 80.1 % (ref 42.7–76)
PLATELET # BLD AUTO: 221 10*3/MM3 (ref 140–450)
PMV BLD AUTO: 9.2 FL (ref 6–12)
POTASSIUM SERPL-SCNC: 4.5 MMOL/L (ref 3.5–5.2)
PROT SERPL-MCNC: 5.9 G/DL (ref 6–8.5)
RBC # BLD AUTO: 4.25 10*6/MM3 (ref 4.14–5.8)
SODIUM SERPL-SCNC: 140 MMOL/L (ref 136–145)
WBC NRBC COR # BLD AUTO: 5.86 10*3/MM3 (ref 3.4–10.8)

## 2025-02-23 PROCEDURE — 73562 X-RAY EXAM OF KNEE 3: CPT

## 2025-02-23 PROCEDURE — 80053 COMPREHEN METABOLIC PANEL: CPT | Performed by: PHYSICIAN ASSISTANT

## 2025-02-23 PROCEDURE — 99284 EMERGENCY DEPT VISIT MOD MDM: CPT

## 2025-02-23 PROCEDURE — 85025 COMPLETE CBC W/AUTO DIFF WBC: CPT | Performed by: PHYSICIAN ASSISTANT

## 2025-02-23 PROCEDURE — 36415 COLL VENOUS BLD VENIPUNCTURE: CPT

## 2025-02-23 PROCEDURE — 93971 EXTREMITY STUDY: CPT

## 2025-02-23 RX ORDER — METHYLPREDNISOLONE 4 MG/1
TABLET ORAL
Qty: 21 TABLET | Refills: 0 | Status: SHIPPED | OUTPATIENT
Start: 2025-02-23

## 2025-02-23 RX ORDER — METHYLPREDNISOLONE 4 MG/1
TABLET ORAL
Qty: 21 TABLET | Refills: 0 | Status: SHIPPED | OUTPATIENT
Start: 2025-02-23 | End: 2025-02-23

## 2025-02-23 RX ORDER — CEPHALEXIN 500 MG/1
500 CAPSULE ORAL 3 TIMES DAILY
Qty: 30 CAPSULE | Refills: 0 | Status: SHIPPED | OUTPATIENT
Start: 2025-02-23 | End: 2025-03-05

## 2025-02-23 NOTE — FSED PROVIDER NOTE
EMERGENCY DEPARTMENT ENCOUNTER    Room Number:  12/12  Date seen:  2/23/2025  Time seen: 09:31 EST  PCP: Nathan Arango MD  Historian: Patient    Discussed/obtained information from independent historians: Nothing    HPI:  Chief complaint: Sinus congestion, left knee injury, left lower extremity swelling  A complete HPI/ROS/PMH/PSH/SH/FH are unobtainable due to: Nothing  Context:Kali Crespo is a 79 y.o. male who presents to the ED with c/o sinus congestion, left knee injury, left lower extremity swelling.  Patient reports roughly 3 weeks ago he fell injuring his left knee.  He has been attempting to self treat with a copper fit brace and OTC Tylenol without any improvement.  States over the weekend he began to help a relative clean the basement.  Over the past few days he has noticed some left lower extremity redness and swelling.  No fever or chills.  No chest pain, palpitations, shortness of breath associated with the lower extremity swelling.  He does not recall any other injury since falling hurting the left knee.  He is not diabetic.  He is here for further evaluation.         Chronic or social conditions impacting care:    ALLERGIES  Amoxicillin, Astemizole, Codeine sulfate, Penicillins, Phenylephrine, Pyrilamine, Sulfa antibiotics, and Sulfabenzamide    PAST MEDICAL HISTORY  Active Ambulatory Problems     Diagnosis Date Noted    Cervical pain 01/25/2016    Migraine without aura and without status migrainosus, not intractable 01/25/2016    Vertigo 01/25/2016    Benign essential HTN 01/25/2016    Acid reflux 01/25/2016    Encounter for pre-operative cardiovascular clearance 01/25/2016    Abnormal EKG 01/25/2016    Hyperlipidemia 04/11/2016    Erectile dysfunction 10/11/2016    Prostate cancer 10/11/2016    Meniere disease, bilateral 11/27/2017    DDD (degenerative disc disease), lumbar 04/30/2019    Chronic midline low back pain with left-sided sciatica 10/10/2019    Malignant neoplastic disease  10/10/2019    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    Diplopia 2016    Laceration of extensor muscle, fascia and tendon of right ring finger at wrist and hand level, initial encounter 06/10/2021    Acquired hypothyroidism 2021    Intractable low back pain 2022    Opioid dependence 2022    Spondylosis 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 myelopathy or radiculopathy 2022    Lumbar radiculopathy 2022    Bilateral stenosis of lateral recess of lumbar spine 2022     Resolved Ambulatory Problems     Diagnosis Date Noted    No Resolved Ambulatory Problems     Past Medical History:   Diagnosis Date    Anxiety     DDD (degenerative disc disease), cervical     DDD (degenerative disc disease), lumbosacral     GERD (gastroesophageal reflux disease)     H/O complete eye exam 2017    Headache     Hypertension     Hyperthyroidism     Low back pain     Measles     Meniere disease     Mumps        PAST SURGICAL HISTORY  Past Surgical History:   Procedure Laterality Date    CATARACT EXTRACTION      COLONOSCOPY      EPIDURAL BLOCK  22    EYE SURGERY      KNEE SURGERY      PROSTATE SURGERY      SINUS SURGERY      TONSILLECTOMY      TRIGGER FINGER RELEASE Left 2021    Procedure: LEFT RING FINGER EXTENSOR TENDON REPAIR;  Surgeon: David Leonard MD;  Location: [facility] OR [facility] [place];  Service: Orthopedics;  Laterality: Left;       FAMILY HISTORY  Family History   Problem Relation Age of Onset    Heart disease Mother     Stroke Mother     Hyperlipidemia Mother     Cancer Father         prostate    Arthritis Father     Hypertension Father     Migraines Sister     Malig Hyperthermia Neg Hx        SOCIAL HISTORY  Social History     Socioeconomic History    Marital status:    Tobacco Use    Smoking status: Former     Current packs/day: 0.00     Types: Cigarettes     Start date: 1965     Quit date: 1969     Years since quittin.1    Smokeless tobacco: Never    Tobacco comments:     40  years ago   Vaping Use    Vaping status: Never Used   Substance and Sexual Activity    Alcohol use: No     Comment: seldom    Drug use: No    Sexual activity: Not Currently     Partners: Female     Birth control/protection: Surgical       REVIEW OF SYSTEMS  Review of Systems    All systems reviewed and negative except for those discussed in HPI.     PHYSICAL EXAM    I have reviewed the triage vital signs and nursing notes.  Vitals:    02/23/25 1133   BP: 142/72   Pulse: 60   Resp: 16   Temp:    SpO2: 96%     Physical Exam    GENERAL: not distressed  HENT: nares patent  EYES: no scleral icterus  NECK: no ROM limitations  CV: regular rhythm, regular rate  RESPIRATORY: normal effort  ABDOMEN: soft  : deferred  MUSCULOSKELETAL: no deformity  NEURO: alert, moves all extremities, follows commands  SKIN: warm, dry    LAB RESULTS  Recent Results (from the past 24 hours)   Comprehensive Metabolic Panel    Collection Time: 02/23/25 11:31 AM    Specimen: Blood   Result Value Ref Range    Glucose 98 65 - 99 mg/dL    BUN 18 8 - 23 mg/dL    Creatinine 0.84 0.76 - 1.27 mg/dL    Sodium 140 136 - 145 mmol/L    Potassium 4.5 3.5 - 5.2 mmol/L    Chloride 106 98 - 107 mmol/L    CO2 26.3 22.0 - 29.0 mmol/L    Calcium 8.8 8.6 - 10.5 mg/dL    Total Protein 5.9 (L) 6.0 - 8.5 g/dL    Albumin 4.0 3.5 - 5.2 g/dL    ALT (SGPT) 9 1 - 41 U/L    AST (SGOT) 12 1 - 40 U/L    Alkaline Phosphatase 91 39 - 117 U/L    Total Bilirubin 0.5 0.0 - 1.2 mg/dL    Globulin 1.9 gm/dL    A/G Ratio 2.1 g/dL    BUN/Creatinine Ratio 21.4 7.0 - 25.0    Anion Gap 7.7 5.0 - 15.0 mmol/L    eGFR 88.7 >60.0 mL/min/1.73   CBC Auto Differential    Collection Time: 02/23/25 11:31 AM    Specimen: Blood   Result Value Ref Range    WBC 5.86 3.40 - 10.80 10*3/mm3    RBC 4.25 4.14 - 5.80 10*6/mm3    Hemoglobin 13.2 13.0 - 17.7 g/dL    Hematocrit 39.5 37.5 - 51.0 %    MCV 92.9 79.0 - 97.0 fL    MCH 31.1 26.6 - 33.0 pg    MCHC 33.4 31.5 - 35.7 g/dL    RDW 12.8 12.3 - 15.4 %     RDW-SD 43.5 37.0 - 54.0 fl    MPV 9.2 6.0 - 12.0 fL    Platelets 221 140 - 450 10*3/mm3    Neutrophil % 80.1 (H) 42.7 - 76.0 %    Lymphocyte % 12.6 (L) 19.6 - 45.3 %    Monocyte % 4.3 (L) 5.0 - 12.0 %    Eosinophil % 2.0 0.3 - 6.2 %    Basophil % 0.7 0.0 - 1.5 %    Immature Grans % 0.3 0.0 - 0.5 %    Neutrophils, Absolute 4.69 1.70 - 7.00 10*3/mm3    Lymphocytes, Absolute 0.74 0.70 - 3.10 10*3/mm3    Monocytes, Absolute 0.25 0.10 - 0.90 10*3/mm3    Eosinophils, Absolute 0.12 0.00 - 0.40 10*3/mm3    Basophils, Absolute 0.04 0.00 - 0.20 10*3/mm3    Immature Grans, Absolute 0.02 0.00 - 0.05 10*3/mm3       Ordered the above labs and independently interpreted results.  My findings will be discussed in the ED course or medical decision making section below    RADIOLOGY RESULTS  US Venous Doppler Lower Extremity Left (duplex)    Result Date: 2/23/2025  Patient: MARY LEVI  Time Out: 11:58 Exam(s): US VENOUS LEFT LOWER EXTREMITY EXAM:   US Duplex Left Lower Extremity Veins CLINICAL HISTORY:    Reason for exam: R o DVT. TECHNIQUE:   Real-time duplex ultrasound scan of the left lower extremity veins integrating B-mode two-dimensional vascular structure, Doppler spectral analysis, color flow Doppler imaging and compression. COMPARISON:   No relevant prior studies available. FINDINGS:   Deep veins:  Unremarkable.  No DVT in the visualized common femoral, femoral, proximal deep femoral or popliteal veins.  The veins demonstrate normal color flow, are normally compressible, with normal phasic flow and or augmentation response.   Superficial veins: Limited visualization due to edema.  No thrombus in the visualized great saphenous vein.   Soft tissues: Subcutaneous edema.  No popliteal cyst. IMPRESSION:       Normal left lower extremity duplex venous ultrasound.     Electronically signed by Sincere Amaral MD on 02-23-25 at 1158    XR Knee 3 View Left    Result Date: 2/23/2025  XR KNEE 3 VW LEFT-3/23/2025  HISTORY: Fell,  left knee injury.  There are hypertrophic changes in the knee. 1 or 2 small loose bodies appear to be present in the tibiofemoral joint. No fractures or other acute bony abnormalities are seen. Bones are slightly demineralized.  There appears to be a small suprapatellar effusion      1. Degenerative arthritis of the left knee. 2. No acute bony abnormality is seen. 3. Small suprapatellar effusion.   This report was finalized on 2/23/2025 10:14 AM by Dr. Kimani Ewing M.D on Workstation: [employer]        Ordered the above noted radiological studies.  Independently interpreted by me.  My findings will be discussed in the medical decision section below.     PROGRESS, DATA ANALYSIS, CONSULTS AND MEDICAL DECISION MAKING    Please note that this section constitutes my independent interpretation of clinical data including lab results, radiology, EKG's.  This constitutes my independent professional opinion regarding differential diagnosis and management of this patient.  It may include any factors such as history from outside sources, review of external records, social determinants of health, management of medications, response to those treatments, and discussions with other providers.    ED Course as of 02/23/25 1318   Sun Feb 23, 2025   1003 I viewed the patient's left knee x-ray and there are significant degenerative changes.  No significant effusion noted. [RC]   1053 IMPRESSION:  1. Degenerative arthritis of the left knee.  2. No acute bony abnormality is seen.  3. Small suprapatellar effusion.        This report was finalized on 2/23/2025 10:14 AM by Dr. Kimani Ewing M.D on Workstation: [id number]      [RC]   1206   IMPRESSION:         Normal left lower extremity duplex venous ultrasound.     IMPRESSION:        Electronically signed by Sincere Amaral MD on 02-23-25 at 1158   [RC]   1209 Exam most consistent with an early cellulitis.  However patient does report the area is very pruritic.  Cannot rule out an  irritant/localized allergic reaction.  Will cover with both Keflex, Medrol and have the patient take Zyrtec until resolved symptoms.  Will have him follow-up with his primary care doctor in the next 3 to 5 days for repeat evaluation return to the ER with any worsening symptoms or should have any further concerns.  Will suggest chest compression socks and elevating the leg above the heart to help with swelling. [RC]      ED Course User Index  [RC] Howie Mondragon III, PA     Orders placed during this visit:  Orders Placed This Encounter   Procedures    US Venous Doppler Lower Extremity Left (duplex)    XR Knee 3 View Left    Comprehensive Metabolic Panel    CBC Auto Differential    CBC & Differential    ED Acknowledgement Form Needed;            Medical Decision Making  Problems Addressed:  Cellulitis of left lower extremity: complicated acute illness or injury    Amount and/or Complexity of Data Reviewed  Labs: ordered.  Radiology: ordered.    Risk  Prescription drug management.            DIAGNOSIS  Final diagnoses:   Cellulitis of left lower extremity          Medication List        New Prescriptions      cephalexin 500 MG capsule  Commonly known as: KEFLEX  Take 1 capsule by mouth 3 (Three) Times a Day for 10 days.     methylPREDNISolone 4 MG dose pack  Commonly known as: MEDROL  Take as directed on package instructions.               Where to Get Your Medications        These medications were sent to [address] - [address] [facility] - [phone]  - [phone]   [address] [facility] [address]      Phone: [phone]   cephalexin 500 MG capsule  methylPREDNISolone 4 MG dose pack         FOLLOW-UP  Nathan Arango MD  [address] [facility] [address]  [facility] [id number] [phone]    In 3 days  For further evaluation and treatment, As needed    Toño Sandra MD  [address] [facility]  [address]  [phone]    Schedule an appointment as soon as possible for a visit   For further evaluation and treatment        Latest Documented Vital Signs:  As of 13:18 EST  BP- 142/72 HR- 60 Temp- 97.9 °F (36.6 °C) (Oral) O2 sat- 96%    Appropriate PPE utilized throughout this patient encounter to include mask, if indicated, per current protocol. Hand hygiene was performed before donning PPE and after removal when leaving the room.    Please note that portions of this were completed with a voice recognition program.     Note Disclaimer: At [facility], we believe that sharing information builds trust and better relationships. You are receiving this note because you are receiving care at [facility] or recently visited. It is possible you will see health information before a provider has talked with you about it. This kind of information can be easy to misunderstand. To help you fully understand what it means for your health, we urge you to discuss this note with your provider.

## 2025-02-23 NOTE — DISCHARGE INSTRUCTIONS
Keep the area clean and dry with mild soap and water.  Take the antibiotic as directed.  Follow-up closely with the family physician if no significant improvement in the next 3 to 5 days.  Return to the ER with any worsening symptoms, fever, or should have any further concerns.

## 2025-02-25 ENCOUNTER — TELEPHONE (OUTPATIENT)
Dept: FAMILY MEDICINE CLINIC | Facility: CLINIC | Age: 80
End: 2025-02-25

## 2025-02-25 NOTE — TELEPHONE ENCOUNTER
Patient is scheduled for a hospital f/up for 3/17 at 1:30.  I called patient and patient stated that he was already feeling better.  I told patient that Dr. Arango is still going to want to follow up with him after being seen in the ER.  Patient is fine with 3/17 at 1:30

## 2025-02-25 NOTE — TELEPHONE ENCOUNTER
Caller: Kali Crespo    Relationship to patient: Self    Best call back number: [phone]     Chief complaint: ER FOLLOW UP; CELLULITIS IN LEFT FOOT AND LEG; MEDICINE THAT WAS CALLED IN FOR HIM IS MAKING HIM NAUSEOUS    Type of visit: HOSPITAL FOLLOW UP    Requested date: ON OR BEFORE 2/26/25      Additional notes:UNABLE TO WARM TRANSFER   PLEASE CALL TO SCHEDULE

## 2025-02-27 ENCOUNTER — TELEPHONE (OUTPATIENT)
Dept: CASE MANAGEMENT | Facility: OTHER | Age: 80
End: 2025-02-27
Payer: MEDICARE

## 2025-03-04 ENCOUNTER — PATIENT OUTREACH (OUTPATIENT)
Dept: CASE MANAGEMENT | Facility: CLINIC | Age: 80
End: 2025-03-04
Payer: MEDICARE

## 2025-03-04 DIAGNOSIS — E78.2 MIXED HYPERLIPIDEMIA: Primary | ICD-10-CM

## 2025-03-04 DIAGNOSIS — I10 BENIGN ESSENTIAL HTN: ICD-10-CM

## 2025-03-04 NOTE — OUTREACH NOTE
AMBULATORY CASE MANAGEMENT NOTE    Names and Relationships of Patient/Support Persons: Contact: Kali Crespo; Relationship: Self -     CCM Interim Update    Spoke with patient at this time for ACO proactive outreach, identified self and role.  Patient was recently in ER for cellulitis of L leg.  Patient states his cellulitis is doing much better, has gone down.  He is scheduled for ER f/u wioth PCP on 3/17/25.  Offered assistance with chronic disease management through CCM, patient declines.  ACM notes patient's BP was high at recent OV with PCP and in ER, patient states his BP is high when he sees providers, but he checks it at home and has been running 120s-130s/70s.  Patient denies any other needs at this time, will close program.        Luisa BOURNE  Ambulatory Case Management    3/4/2025, 11:47 EST

## 2025-03-15 NOTE — PROGRESS NOTES
Subjective   Kali Crespo is a 79 y.o. male.     CC: ED F/U for Cellulitis    History of Present Illness     Pt returns today after trip to the [facility] ED on 2/23/25 with this note:    HPI:  Chief complaint: Sinus congestion, left knee injury, left lower extremity swelling  A complete HPI/ROS/PMH/PSH/SH/FH are unobtainable due to: Nothing  Context:Kali Crespo is a 79 y.o. male who presents to the ED with c/o sinus congestion, left knee injury, left lower extremity swelling.  Patient reports roughly 3 weeks ago he fell injuring his left knee.  He has been attempting to self treat with a copper fit brace and OTC Tylenol without any improvement.  States over the weekend he began to help a relative clean the basement.  Over the past few days he has noticed some left lower extremity redness and swelling.  No fever or chills.  No chest pain, palpitations, shortness of breath associated with the lower extremity swelling.  He does not recall any other injury since falling hurting the left knee.  He is not diabetic.  He is here for further evaluation.     Exam most consistent with an early cellulitis.  However patient does report the area is very pruritic.  Cannot rule out an irritant/localized allergic reaction.  Will cover with both Keflex, Medrol and have the patient take Zyrtec until resolved symptoms.  Will have him follow-up with his primary care doctor in the next 3 to 5 days for repeat evaluation return to the ER with any worsening symptoms or should have any further concerns.  Will suggest chest compression socks and elevating the leg above the heart to help with swelling.    DIAGNOSIS  Final diagnoses:  Cellulitis of left lower extremity    cephalexin 500 MG capsule  Commonly known as: KEFLEX  Take 1 capsule by mouth 3 (Three) Times a Day for 10 days.     methylPREDNISolone 4 MG dose pack  Commonly known as: MEDROL  Take as directed on package instructions.    Current outpatient and discharge medications  "have been reconciled for the patient.  Reviewed by: Nathan Arango MD    Pt now back to normal and feeling good.          The following portions of the patient's history were reviewed and updated as appropriate: allergies, current medications, past family history, past medical history, past social history, past surgical history, and problem list.    Review of Systems   Constitutional:  Negative for activity change, chills and fever.   Respiratory:  Negative for cough.    Cardiovascular:  Negative for chest pain.   Psychiatric/Behavioral:  Negative for dysphoric mood.        /74   Pulse 70   Temp 98.2 °F (36.8 °C) (Oral)   Resp 16   Ht 177.8 cm (70\")   Wt 88.5 kg (195 lb)   SpO2 98%   BMI 27.98 kg/m²     Objective   Physical Exam  Vitals and nursing note reviewed.   Constitutional:       General: He is not in acute distress.     Appearance: He is well-developed.   Cardiovascular:      Rate and Rhythm: Normal rate and regular rhythm.   Pulmonary:      Effort: Pulmonary effort is normal.      Breath sounds: Normal breath sounds.   Neurological:      Mental Status: He is alert and oriented to person, place, and time.   Psychiatric:         Behavior: Behavior normal.         Thought Content: Thought content normal.     Hospital records reviewed with pt confirming HPI.      Assessment & Plan   Diagnoses and all orders for this visit:    1. Cellulitis of left leg (Primary)    2. Hospital discharge follow-up    Pt to resume normal activity.              "

## 2025-03-17 ENCOUNTER — OFFICE VISIT (OUTPATIENT)
Dept: FAMILY MEDICINE CLINIC | Facility: CLINIC | Age: 80
End: 2025-03-17
Payer: MEDICARE

## 2025-03-17 VITALS
HEART RATE: 70 BPM | RESPIRATION RATE: 16 BRPM | SYSTOLIC BLOOD PRESSURE: 136 MMHG | TEMPERATURE: 98.2 F | HEIGHT: 70 IN | OXYGEN SATURATION: 98 % | DIASTOLIC BLOOD PRESSURE: 74 MMHG | WEIGHT: 195 LBS | BODY MASS INDEX: 27.92 KG/M2

## 2025-03-17 DIAGNOSIS — Z09 HOSPITAL DISCHARGE FOLLOW-UP: ICD-10-CM

## 2025-03-17 DIAGNOSIS — L03.116 CELLULITIS OF LEFT LEG: Primary | ICD-10-CM

## 2025-04-03 DIAGNOSIS — E78.2 MIXED HYPERLIPIDEMIA: Chronic | ICD-10-CM

## 2025-04-03 DIAGNOSIS — K21.9 GASTROESOPHAGEAL REFLUX DISEASE WITHOUT ESOPHAGITIS: Chronic | ICD-10-CM

## 2025-04-03 DIAGNOSIS — E03.9 ACQUIRED HYPOTHYROIDISM: Chronic | ICD-10-CM

## 2025-04-03 DIAGNOSIS — I10 BENIGN ESSENTIAL HTN: Chronic | ICD-10-CM

## 2025-04-03 RX ORDER — LEVOTHYROXINE SODIUM 25 UG/1
25 TABLET ORAL DAILY
Qty: 90 TABLET | Refills: 0 | Status: SHIPPED | OUTPATIENT
Start: 2025-04-03

## 2025-04-03 RX ORDER — SIMVASTATIN 20 MG
20 TABLET ORAL EVERY EVENING
Qty: 90 TABLET | Refills: 0 | Status: SHIPPED | OUTPATIENT
Start: 2025-04-03

## 2025-04-03 RX ORDER — PANTOPRAZOLE SODIUM 40 MG/1
40 TABLET, DELAYED RELEASE ORAL DAILY
Qty: 90 TABLET | Refills: 0 | Status: SHIPPED | OUTPATIENT
Start: 2025-04-03

## 2025-04-03 RX ORDER — IRBESARTAN 300 MG/1
300 TABLET ORAL DAILY
Qty: 90 TABLET | Refills: 0 | Status: SHIPPED | OUTPATIENT
Start: 2025-04-03

## 2025-04-03 RX ORDER — AMLODIPINE BESYLATE 5 MG/1
5 TABLET ORAL DAILY
Qty: 90 TABLET | Refills: 0 | Status: SHIPPED | OUTPATIENT
Start: 2025-04-03

## 2025-05-22 ENCOUNTER — OFFICE VISIT (OUTPATIENT)
Dept: FAMILY MEDICINE CLINIC | Facility: CLINIC | Age: 80
End: 2025-05-22
Payer: MEDICARE

## 2025-05-22 VITALS
HEART RATE: 64 BPM | HEIGHT: 70 IN | RESPIRATION RATE: 14 BRPM | SYSTOLIC BLOOD PRESSURE: 126 MMHG | BODY MASS INDEX: 27.54 KG/M2 | TEMPERATURE: 98.1 F | WEIGHT: 192.4 LBS | OXYGEN SATURATION: 99 % | DIASTOLIC BLOOD PRESSURE: 72 MMHG

## 2025-05-22 DIAGNOSIS — E03.9 ACQUIRED HYPOTHYROIDISM: Chronic | ICD-10-CM

## 2025-05-22 DIAGNOSIS — J30.1 CHRONIC SEASONAL ALLERGIC RHINITIS DUE TO POLLEN: Chronic | ICD-10-CM

## 2025-05-22 DIAGNOSIS — I10 BENIGN ESSENTIAL HTN: Chronic | ICD-10-CM

## 2025-05-22 DIAGNOSIS — Z12.11 SCREENING FOR COLON CANCER: ICD-10-CM

## 2025-05-22 DIAGNOSIS — F41.9 ANXIETY: Chronic | ICD-10-CM

## 2025-05-22 DIAGNOSIS — K21.9 GASTROESOPHAGEAL REFLUX DISEASE WITHOUT ESOPHAGITIS: Chronic | ICD-10-CM

## 2025-05-22 DIAGNOSIS — R11.0 NAUSEA: ICD-10-CM

## 2025-05-22 DIAGNOSIS — E78.2 MIXED HYPERLIPIDEMIA: Chronic | ICD-10-CM

## 2025-05-22 DIAGNOSIS — Z00.00 ENCOUNTER FOR SUBSEQUENT ANNUAL WELLNESS VISIT (AWV) IN MEDICARE PATIENT: Primary | ICD-10-CM

## 2025-05-22 DIAGNOSIS — C61 PROSTATE CANCER: ICD-10-CM

## 2025-05-22 RX ORDER — DIAZEPAM 2 MG/1
2 TABLET ORAL 2 TIMES DAILY PRN
Qty: 180 TABLET | Refills: 0 | Status: SHIPPED | OUTPATIENT
Start: 2025-05-22

## 2025-05-22 RX ORDER — ONDANSETRON 4 MG/1
4 TABLET, FILM COATED ORAL EVERY 8 HOURS PRN
Qty: 20 TABLET | Refills: 5 | Status: SHIPPED | OUTPATIENT
Start: 2025-05-22

## 2025-05-22 RX ORDER — IRBESARTAN 300 MG/1
300 TABLET ORAL DAILY
Qty: 90 TABLET | Refills: 1 | Status: SHIPPED | OUTPATIENT
Start: 2025-05-22

## 2025-05-22 RX ORDER — LEVOTHYROXINE SODIUM 25 UG/1
25 TABLET ORAL DAILY
Qty: 90 TABLET | Refills: 1 | Status: SHIPPED | OUTPATIENT
Start: 2025-05-22

## 2025-05-22 RX ORDER — PANTOPRAZOLE SODIUM 40 MG/1
40 TABLET, DELAYED RELEASE ORAL DAILY
Qty: 90 TABLET | Refills: 1 | Status: SHIPPED | OUTPATIENT
Start: 2025-05-22

## 2025-05-22 RX ORDER — FLUTICASONE PROPIONATE 50 MCG
2 SPRAY, SUSPENSION (ML) NASAL DAILY
Qty: 48 G | Refills: 1 | Status: SHIPPED | OUTPATIENT
Start: 2025-05-22

## 2025-05-22 RX ORDER — AMLODIPINE BESYLATE 5 MG/1
5 TABLET ORAL DAILY
Qty: 90 TABLET | Refills: 1 | Status: SHIPPED | OUTPATIENT
Start: 2025-05-22

## 2025-05-22 RX ORDER — SIMVASTATIN 20 MG
20 TABLET ORAL EVERY EVENING
Qty: 90 TABLET | Refills: 1 | Status: SHIPPED | OUTPATIENT
Start: 2025-05-22

## 2025-05-22 NOTE — PATIENT INSTRUCTIONS
Medicare Wellness  Personal Prevention Plan of Service     Date of Office Visit:    Encounter Provider:  Nathan Arango MD  Place of Service:  [facility]  Patient Name: Kali Crespo  :  1945    As part of the Medicare Wellness portion of your visit today, we are providing you with this personalized preventive plan of services (PPPS). This plan is based upon recommendations of the [place] Preventive Services Task Force (USPSTF) and the Advisory Committee on Immunization Practices (ACIP).    This lists the preventive care services that should be considered, and provides dates of when you are due. Items listed as completed are up-to-date and do not require any further intervention.    Health Maintenance   Topic Date Due    COLORECTAL CANCER SCREENING  2025    COVID-19 Vaccine (2024- season) 2025 (Originally 2024)    RSV Vaccine - Adults (1 - 1-dose 75+ series) 2026 (Originally 2020)    INFLUENZA VACCINE  2025    PROSTATE CANCER SCREENING  05/15/2026    LIPID PANEL  05/15/2026    ANNUAL WELLNESS VISIT  2026    Pneumococcal Vaccine 50+  Completed    ZOSTER VACCINE  Completed    HEPATITIS C SCREENING  Discontinued    TDAP/TD VACCINES  Discontinued       Orders Placed This Encounter   Procedures    T4, Free     Standing Status:   Future     Expected Date:   10/22/2025     Expiration Date:   2026     Release to patient:   Routine Release [[id number]]    TSH     Standing Status:   Future     Expected Date:   10/22/2025     Expiration Date:   2026     Release to patient:   Routine Release [[id number]]    Lipid Panel     Standing Status:   Future     Expected Date:   10/22/2025     Expiration Date:   2026     Release to patient:   Routine Release [[id number]]    Basic Metabolic Panel     Standing Status:   Future     Expected Date:   10/22/2025     Expiration Date:   2026     Release to patient:   Routine  Release [[id number]]    Ambulatory Referral to Gastroenterology     Referral Priority:   Routine     Referral Type:   Consultation     Referral Reason:   Specialty Services Required     Requested Specialty:   Gastroenterology     Number of Visits Requested:   1       Return in about 3 months (around 8/22/2025) for Recheck.

## 2025-05-22 NOTE — ASSESSMENT & PLAN NOTE
Hypertension is stable and controlled  Continue current treatment regimen.  Blood pressure will be reassessed in 6 months.    Orders:    amLODIPine (NORVASC) 5 MG tablet; Take 1 tablet by mouth Daily.    irbesartan (AVAPRO) 300 MG tablet; Take 1 tablet by mouth Daily.    Basic Metabolic Panel; Future    Comprehensive metabolic panel; Future    Lipid panel; Future    CBC and Differential; Future

## 2025-05-22 NOTE — ASSESSMENT & PLAN NOTE
Orders:    levothyroxine (SYNTHROID, LEVOTHROID) 25 MCG tablet; Take 1 tablet by mouth Daily.    T4, Free; Future    TSH; Future

## 2025-05-22 NOTE — PROGRESS NOTES
Subjective   The ABCs of the Annual Wellness Visit  Medicare Wellness Visit      Kali Crespo is a 79 y.o. patient who presents for a Medicare Wellness Visit.    The following portions of the patient's history were reviewed and   updated as appropriate: allergies, current medications, past family history, past medical history, past social history, past surgical history, and problem list.    Compared to one year ago, the patient's physical   health is the same.  Compared to one year ago, the patient's mental   health is the same.    Recent Hospitalizations:  He was not admitted to the hospital during the last year.     Current Medical Providers:  Patient Care Team:  Nathan Arango MD as PCP - General  Nathan Arango MD as PCP - Family Medicine  Nena Ramey APRN (Dermatology)  Siddharth Ho MD as Consulting Physician (Pain Medicine)  Wolf Diaz MD as Surgeon (Neurosurgery)  Nigel Alvarez MD as Consulting Physician (Ophthalmology)  Nathan Madison MD as Consulting Physician (Dermatology)  [place] APRN (Nurse Practitioner)    Outpatient Medications Prior to Visit   Medication Sig Dispense Refill    amLODIPine (NORVASC) 5 MG tablet TAKE 1 TABLET BY MOUTH DAILY 90 tablet 0    diazePAM (VALIUM) 2 MG tablet Take 1 tablet by mouth 2 (Two) Times a Day As Needed for Anxiety. 180 tablet 0    fluticasone (FLONASE) 50 MCG/ACT nasal spray Administer 2 sprays into the nostril(s) as directed by provider Daily. 48 g 1    irbesartan (AVAPRO) 300 MG tablet TAKE 1 TABLET BY MOUTH DAILY 90 tablet 0    levothyroxine (SYNTHROID, LEVOTHROID) 25 MCG tablet TAKE 1 TABLET BY MOUTH DAILY 90 tablet 0    ondansetron (Zofran) 4 MG tablet Take 1 tablet by mouth Every 8 (Eight) Hours As Needed for Nausea or Vomiting. 20 tablet 5    pantoprazole (PROTONIX) 40 MG EC tablet TAKE 1 TABLET BY MOUTH DAILY 90 tablet 0    simvastatin (ZOCOR) 20 MG tablet TAKE 1 TABLET BY MOUTH IN THE  EVENING  90 tablet 0    EPINEPHrine (EPIPEN) 0.3 MG/0.3ML solution auto-injector injection ADMINISTER 0.3 ML IN THE MUSCLE 1 TIME FOR 1 DOSE AS DIRECTED BY PRESCRIBER      gabapentin (NEURONTIN) 600 MG tablet Take 1 tablet by mouth Daily.      HYDROcodone-acetaminophen (NORCO) 7.5-325 MG per tablet       loratadine (CLARITIN) 10 MG tablet Take 1 tablet by mouth Daily.      Multiple Vitamins-Minerals (CENTRAL-KEVON FOR SENIORS PO) Take 1 tablet by mouth Daily. HOLD PER MD INSTR      naloxone (NARCAN) 4 MG/0.1ML nasal spray CALL 911. SPR CONTENTS OF ONE SPRAYER (0.1ML) INTO ONE NOSTRIL. REPEAT IN 2-3 MIN IF SYMPTOMS OF OPIOID EMERGENCY PERSIST, ALTERNATE NOSTRILS      Omega-3 Fatty Acids (FISH OIL) 1000 MG capsule capsule Take 1 capsule by mouth Daily. HOLD PER MD INSTR      sildenafil (Viagra) 100 MG tablet Take 1 tablet by mouth Daily As Needed for Erectile Dysfunction. 10 tablet 11    Thiamine HCl (VITAMIN B-1) 100 MG tablet Take 5 tablets by mouth Daily. HOLD PER MD INSTR      methylPREDNISolone (MEDROL) 4 MG dose pack Take as directed on package instructions. (Patient not taking: Reported on 3/17/2025) 21 tablet 0     No facility-administered medications prior to visit.     Opioid medication/s are on active medication list.  and I have evaluated his active treatment plan and pain score trends (see table).  Vitals:    05/22/25 1300   PainSc: 5      I have reviewed the chart for potential of high risk medication and harmful drug interactions in the elderly.        Aspirin is not on active medication list.  Aspirin use is not indicated based on review of current medical condition/s. Risk of harm outweighs potential benefits.  .    Patient Active Problem List   Diagnosis    Cervical pain    Migraine without aura and without status migrainosus, not intractable    Vertigo    Benign essential HTN    Acid reflux    Encounter for pre-operative cardiovascular clearance    Abnormal EKG    Hyperlipidemia    Erectile dysfunction     "Prostate cancer    Meniere disease, bilateral    DDD (degenerative disc disease), lumbar    Chronic midline low back pain with left-sided sciatica    Malignant neoplastic disease    Alternating esotropia    Diplopia    Laceration of extensor muscle, fascia and tendon of right ring finger at wrist and hand level, initial encounter    Acquired hypothyroidism    Intractable low back pain    Opioid dependence    Spondylosis 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 myelopathy or radiculopathy    Lumbar radiculopathy    Bilateral stenosis of lateral recess of lumbar spine     Advance Care Planning Advance Directive is on file.  ACP discussion was held with the patient during this visit. Patient has an advance directive in EMR which is still valid.             Objective   Vitals:    25 1300   BP: 126/72   Pulse: 64   Resp: 14   Temp: 98.1 °F (36.7 °C)   TempSrc: Oral   SpO2: 99%   Weight: 87.3 kg (192 lb 6.4 oz)   Height: 177.8 cm (70\")   PainSc: 5        Estimated body mass index is 27.61 kg/m² as calculated from the following:    Height as of this encounter: 177.8 cm (70\").    Weight as of this encounter: 87.3 kg (192 lb 6.4 oz).    BMI is >= 25 and <30. (Overweight) The following options were offered after discussion;: exercise counseling/recommendations and nutrition counseling/recommendations           Does the patient have evidence of cognitive impairment? No  Lab Results   Component Value Date    CHLPL 177 05/15/2025    TRIG 96 05/15/2025    HDL 48 05/15/2025     (H) 05/15/2025    VLDL 18 05/15/2025                                                                                                Health  Risk Assessment    Smoking Status:  Social History     Tobacco Use   Smoking Status Former    Current packs/day: 0.00    Types: Cigarettes    Start date: 1965    Quit date: 1969    Years since quittin.4   Smokeless Tobacco Never   Tobacco Comments    40 years ago     Alcohol Consumption:  Social History "     Substance and Sexual Activity   Alcohol Use No    Comment: seldom       Fall Risk Screen  YARIELADI Fall Risk Assessment has not been completed.    Depression Screening   Little interest or pleasure in doing things? Not at all   Feeling down, depressed, or hopeless? Not at all   PHQ-2 Total Score 0      Health Habits and Functional and Cognitive Screenin/22/2025     1:00 PM   Functional & Cognitive Status   Do you have difficulty preparing food and eating? No   Do you have difficulty bathing yourself, getting dressed or grooming yourself? No   Do you have difficulty using the toilet? No   Do you have difficulty moving around from place to place? No   Do you have trouble with steps or getting out of a bed or a chair? No   Current Diet Well Balanced Diet   Dental Exam Not up to date   Eye Exam Up to date   Exercise (times per week) 5 times per week   Current Exercises Include Walking   Do you need help using the phone?  No   Are you deaf or do you have serious difficulty hearing?  No   Do you need help to go to places out of walking distance? No   Do you need help shopping? No   Do you need help preparing meals?  No   Do you need help with housework?  No   Do you need help with laundry? No   Do you need help taking your medications? No   Do you need help managing money? No   Do you ever drive or ride in a car without wearing a seat belt? No   Have you felt unusual stress, anger or loneliness in the last month? No   Who do you live with? Spouse   If you need help, do you have trouble finding someone available to you? No   Have you been bothered in the last four weeks by sexual problems? No   Do you have difficulty concentrating, remembering or making decisions? No           Age-appropriate Screening Schedule:  Refer to the list below for future screening recommendations based on patient's age, sex and/or medical conditions. Orders for these recommended tests are listed in the plan section. The patient has  been provided with a written plan.    Health Maintenance List  Health Maintenance   Topic Date Due    COLORECTAL CANCER SCREENING  05/28/2025    COVID-19 Vaccine (6 - 2024-25 season) 11/22/2025 (Originally 9/1/2024)    RSV Vaccine - Adults (1 - 1-dose 75+ series) 03/17/2026 (Originally 7/21/2020)    INFLUENZA VACCINE  07/01/2025    PROSTATE CANCER SCREENING  05/15/2026    LIPID PANEL  05/15/2026    ANNUAL WELLNESS VISIT  05/22/2026    Pneumococcal Vaccine 50+  Completed    ZOSTER VACCINE  Completed    HEPATITIS C SCREENING  Discontinued    TDAP/TD VACCINES  Discontinued                                                                                                                                                CMS Preventative Services Quick Reference  Risk Factors Identified During Encounter  None Identified    The above risks/problems have been discussed with the patient.  Pertinent information has been shared with the patient in the After Visit Summary.  An After Visit Summary and PPPS were made available to the patient.    Follow Up:   Next Medicare Wellness visit to be scheduled in 1 year.         Additional E&M Note during same encounter follows:  Patient has additional, significant, and separately identifiable condition(s)/problem(s) that require work above and beyond the Medicare Wellness Visit     Chief Complaint  medicare wellness (DUE), Hypertension (CSA UPDATED TODAY COPY TO SCAN IN COMPUTER), Hyperlipidemia, Hypothyroidism, Anxiety, and Allergies    Subjective   HPI  Kali is also being seen today for additional medical problem/s.    Review of Systems   Constitutional:  Negative for chills and fever.   HENT:  Negative for congestion, ear pain and sinus pressure.    Eyes:  Negative for pain and visual disturbance.   Respiratory:  Negative for cough and shortness of breath.    Cardiovascular:  Negative for chest pain.   Gastrointestinal:  Negative for abdominal pain.   Genitourinary:  Negative for  "difficulty urinating and dysuria.   Skin: Negative.    Neurological: Negative.    Psychiatric/Behavioral:  Negative for dysphoric mood.               Objective   Vital Signs:  /72   Pulse 64   Temp 98.1 °F (36.7 °C) (Oral)   Resp 14   Ht 177.8 cm (70\")   Wt 87.3 kg (192 lb 6.4 oz)   SpO2 99%   BMI 27.61 kg/m²   Physical Exam  Vitals and nursing note reviewed.   Constitutional:       General: He is not in acute distress.     Appearance: He is well-developed.   Cardiovascular:      Rate and Rhythm: Normal rate and regular rhythm.   Pulmonary:      Effort: Pulmonary effort is normal.      Breath sounds: Normal breath sounds.   Neurological:      Mental Status: He is alert and oriented to person, place, and time.   Psychiatric:         Behavior: Behavior normal.         Thought Content: Thought content normal.     Labs reviewed with pt today during visit. All questions answered.                 Assessment and Plan Additional age appropriate preventative wellness advice topics were discussed during today's preventative wellness exam(some topics already addressed during AWV portion of the note above):    Physical Activity: Advised cardiovascular activity 150 minutes per week as tolerated. (example brisk walk for 30 minutes, 5 days a week).     Nutrition: Discussed nutrition plan with patient. Information shared in after visit summary. Goal is for a well balanced diet to enhance overall health.     Encounter for subsequent annual wellness visit (AWV) in Medicare patient         Anxiety    Orders:    diazePAM (VALIUM) 2 MG tablet; Take 1 tablet by mouth 2 (Two) Times a Day As Needed for Anxiety.    Benign essential HTN  Hypertension is stable and controlled  Continue current treatment regimen.  Blood pressure will be reassessed in 6 months.    Orders:    amLODIPine (NORVASC) 5 MG tablet; Take 1 tablet by mouth Daily.    irbesartan (AVAPRO) 300 MG tablet; Take 1 tablet by mouth Daily.    Basic Metabolic Panel; " Future    Comprehensive metabolic panel; Future    Lipid panel; Future    CBC and Differential; Future    Chronic seasonal allergic rhinitis due to pollen    Orders:    fluticasone (FLONASE) 50 MCG/ACT nasal spray; Administer 2 sprays into the nostril(s) as directed by provider Daily.    Acquired hypothyroidism    Orders:    levothyroxine (SYNTHROID, LEVOTHROID) 25 MCG tablet; Take 1 tablet by mouth Daily.    T4, Free; Future    TSH; Future    Nausea    Orders:    ondansetron (Zofran) 4 MG tablet; Take 1 tablet by mouth Every 8 (Eight) Hours As Needed for Nausea or Vomiting.    Gastroesophageal reflux disease without esophagitis    Orders:    pantoprazole (PROTONIX) 40 MG EC tablet; Take 1 tablet by mouth Daily.    Mixed hyperlipidemia   Lipid abnormalities are stable    Plan:  Continue same medication/s without change.      Counseled patient on lifestyle modifications to help control hyperlipidemia.     Patient Treatment Goals:   LDL goal is under 100    Followup in 6 months.    Orders:    simvastatin (ZOCOR) 20 MG tablet; Take 1 tablet by mouth Every Evening.    Lipid panel; Future    Screening for colon cancer    Orders:    Ambulatory Referral to Gastroenterology    Prostate cancer    Orders:    PSA; Future          I spent 15 minutes caring for Kali on this date of service. This time includes time spent by me in the following activities:preparing for the visit, reviewing tests, performing a medically appropriate examination and/or evaluation , ordering medications, tests, or procedures, documenting information in the medical record, and independently interpreting results and communicating that information with the patient/family/caregiver  Follow Up   Return in about 3 months (around 8/22/2025) for Recheck.  Patient was given instructions and counseling regarding his condition or for health maintenance advice. Please see specific information pulled into the AVS if appropriate.

## 2025-05-22 NOTE — ASSESSMENT & PLAN NOTE
Lipid abnormalities are stable    Plan:  Continue same medication/s without change.      Counseled patient on lifestyle modifications to help control hyperlipidemia.     Patient Treatment Goals:   LDL goal is under 100    Followup in 6 months.    Orders:    simvastatin (ZOCOR) 20 MG tablet; Take 1 tablet by mouth Every Evening.    Lipid panel; Future

## 2025-08-25 ENCOUNTER — OFFICE VISIT (OUTPATIENT)
Dept: FAMILY MEDICINE CLINIC | Facility: CLINIC | Age: 80
End: 2025-08-25
Payer: MEDICARE

## 2025-08-25 VITALS
BODY MASS INDEX: 28.4 KG/M2 | HEART RATE: 66 BPM | DIASTOLIC BLOOD PRESSURE: 72 MMHG | SYSTOLIC BLOOD PRESSURE: 130 MMHG | TEMPERATURE: 97.5 F | OXYGEN SATURATION: 99 % | RESPIRATION RATE: 14 BRPM | WEIGHT: 198.4 LBS | HEIGHT: 70 IN

## 2025-08-25 DIAGNOSIS — F41.9 ANXIETY: Chronic | ICD-10-CM

## 2025-08-25 PROCEDURE — 3078F DIAST BP <80 MM HG: CPT | Performed by: FAMILY MEDICINE

## 2025-08-25 PROCEDURE — 1125F AMNT PAIN NOTED PAIN PRSNT: CPT | Performed by: FAMILY MEDICINE

## 2025-08-25 PROCEDURE — 3075F SYST BP GE 130 - 139MM HG: CPT | Performed by: FAMILY MEDICINE

## 2025-08-25 PROCEDURE — 1159F MED LIST DOCD IN RCRD: CPT | Performed by: FAMILY MEDICINE

## 2025-08-25 PROCEDURE — 1160F RVW MEDS BY RX/DR IN RCRD: CPT | Performed by: FAMILY MEDICINE

## 2025-08-25 PROCEDURE — 99213 OFFICE O/P EST LOW 20 MIN: CPT | Performed by: FAMILY MEDICINE

## 2025-08-25 RX ORDER — DIAZEPAM 2 MG/1
2 TABLET ORAL 2 TIMES DAILY PRN
Qty: 180 TABLET | Refills: 0 | Status: SHIPPED | OUTPATIENT
Start: 2025-08-25

## (undated) DEVICE — DISPOSABLE BIPOLAR FORCEPS 4" (10.2CM) JEWELERS, STRAIGHT 0.4MM TIP AND 12 FT. (3.6M) CABLE: Brand: KIRWAN

## (undated) DEVICE — SUT PROLN 4/0 FS2 18IN 8683G

## (undated) DEVICE — BNDG ELAS ELITE V/CLOSE 4IN 5YD LF STRL

## (undated) DEVICE — DRSNG GZ PETROLTM XEROFORM CURAD 1X8IN STRL

## (undated) DEVICE — SKIN PREP TRAY W/CHG: Brand: MEDLINE INDUSTRIES, INC.

## (undated) DEVICE — COTTON UNDERCAST PADDING,REGULAR FINISH: Brand: WEBRIL

## (undated) DEVICE — VESSEL LOOPS X-RAY DETECTABLE: Brand: DEROYAL

## (undated) DEVICE — BNDG ELAS ELITE V/CLOSE 3IN 5YD LF STRL

## (undated) DEVICE — WEBRIL* CAST PADDING: Brand: DEROYAL

## (undated) DEVICE — SYR LL TP 10ML STRL

## (undated) DEVICE — BNDG ELAS MATRX  2IN 5YD LF STRL

## (undated) DEVICE — PK ORTHO MINOR TOWER 40

## (undated) DEVICE — GLV SURG BIOGEL LTX PF 7

## (undated) DEVICE — SPNG GZ WOVN 4X4IN 12PLY 10/BX STRL

## (undated) DEVICE — ARM SLING: Brand: DEROYAL

## (undated) DEVICE — ELECTRD BLD EZ CLN MOD XLNG 2.75IN

## (undated) DEVICE — DRAPE,HAND,STERILE: Brand: MEDLINE

## (undated) DEVICE — DRAPE,U/ SHT,SPLIT,PLAS,STERIL: Brand: MEDLINE

## (undated) DEVICE — SUT PROLN 6/0 RB2 D/A 30IN 8711H

## (undated) DEVICE — UNDRGLV SURG BIOGEL PUNCTUREINDICATION SZ7 PF STRL

## (undated) DEVICE — BNDG ESMARK STRL 6INX12FT LF

## (undated) DEVICE — SOL ISO/ALC RUB 70PCT 4OZ